# Patient Record
Sex: FEMALE | Race: WHITE | NOT HISPANIC OR LATINO | Employment: OTHER | RURAL
[De-identification: names, ages, dates, MRNs, and addresses within clinical notes are randomized per-mention and may not be internally consistent; named-entity substitution may affect disease eponyms.]

---

## 2020-10-21 ENCOUNTER — HISTORICAL (OUTPATIENT)
Dept: ADMINISTRATIVE | Facility: HOSPITAL | Age: 75
End: 2020-10-21

## 2022-12-31 ENCOUNTER — HOSPITAL ENCOUNTER (EMERGENCY)
Facility: HOSPITAL | Age: 77
Discharge: HOME | End: 2022-12-31
Payer: MEDICARE

## 2022-12-31 VITALS
BODY MASS INDEX: 33.32 KG/M2 | SYSTOLIC BLOOD PRESSURE: 159 MMHG | WEIGHT: 200 LBS | RESPIRATION RATE: 16 BRPM | OXYGEN SATURATION: 98 % | DIASTOLIC BLOOD PRESSURE: 73 MMHG | HEART RATE: 63 BPM | TEMPERATURE: 99 F | HEIGHT: 65 IN

## 2022-12-31 DIAGNOSIS — S89.92XA LEFT LEG INJURY, INITIAL ENCOUNTER: Primary | ICD-10-CM

## 2022-12-31 PROCEDURE — 99284 EMERGENCY DEPT VISIT MOD MDM: CPT | Mod: 25

## 2022-12-31 PROCEDURE — 99282 PR EMERGENCY DEPT VISIT,LEVEL II: ICD-10-PCS | Mod: EDII,,, | Performed by: NURSE PRACTITIONER

## 2022-12-31 PROCEDURE — 63600175 PHARM REV CODE 636 W HCPCS: Performed by: NURSE PRACTITIONER

## 2022-12-31 PROCEDURE — 90715 TDAP VACCINE 7 YRS/> IM: CPT | Performed by: NURSE PRACTITIONER

## 2022-12-31 PROCEDURE — 99282 EMERGENCY DEPT VISIT SF MDM: CPT | Mod: EDII,,, | Performed by: NURSE PRACTITIONER

## 2022-12-31 PROCEDURE — 99282 EMERGENCY DEPT VISIT SF MDM: CPT | Mod: GF,25

## 2022-12-31 PROCEDURE — 90471 IMMUNIZATION ADMIN: CPT | Performed by: NURSE PRACTITIONER

## 2022-12-31 RX ORDER — SIMVASTATIN 40 MG/1
1 TABLET, FILM COATED ORAL NIGHTLY
COMMUNITY
Start: 2022-12-21

## 2022-12-31 RX ORDER — TRIAMCINOLONE ACETONIDE 1 MG/G
CREAM TOPICAL
COMMUNITY
Start: 2022-11-21

## 2022-12-31 RX ORDER — METOPROLOL SUCCINATE 50 MG/1
1 TABLET, EXTENDED RELEASE ORAL 2 TIMES DAILY
COMMUNITY
Start: 2022-12-12

## 2022-12-31 RX ORDER — PRENATAL VIT CALC,IRON,FOLIC
1 TABLET ORAL DAILY
COMMUNITY
End: 2024-03-04 | Stop reason: CLARIF

## 2022-12-31 RX ORDER — OXYBUTYNIN CHLORIDE 10 MG/1
1 TABLET, EXTENDED RELEASE ORAL 2 TIMES DAILY
COMMUNITY
Start: 2022-12-23

## 2022-12-31 RX ORDER — ALBUTEROL SULFATE 90 UG/1
2 AEROSOL, METERED RESPIRATORY (INHALATION) EVERY 6 HOURS PRN
COMMUNITY
Start: 2022-11-21

## 2022-12-31 RX ORDER — CALC/MAG/B COMPLEX/D3/HERB 61
15 TABLET ORAL
COMMUNITY
End: 2024-03-04 | Stop reason: CLARIF

## 2022-12-31 RX ORDER — SERTRALINE HYDROCHLORIDE 100 MG/1
100 TABLET, FILM COATED ORAL
COMMUNITY
Start: 2022-12-21

## 2022-12-31 RX ORDER — FAMOTIDINE 20 MG/1
TABLET, FILM COATED ORAL
COMMUNITY
Start: 2022-06-25 | End: 2024-03-04 | Stop reason: CLARIF

## 2022-12-31 RX ORDER — FLUTICASONE PROPIONATE AND SALMETEROL 250; 50 UG/1; UG/1
1 POWDER RESPIRATORY (INHALATION)
COMMUNITY
Start: 2022-11-21

## 2022-12-31 RX ORDER — CHOLECALCIFEROL (VITAMIN D3) 25 MCG
5000 TABLET ORAL
COMMUNITY

## 2022-12-31 RX ORDER — ASPIRIN 325 MG
325 TABLET ORAL EVERY OTHER DAY
COMMUNITY

## 2022-12-31 RX ADMIN — TETANUS TOXOID, REDUCED DIPHTHERIA TOXOID AND ACELLULAR PERTUSSIS VACCINE, ADSORBED 0.5 ML: 5; 2.5; 8; 8; 2.5 SUSPENSION INTRAMUSCULAR at 03:12

## 2022-12-31 NOTE — ED PROVIDER NOTES
Encounter Date: 12/31/2022       History     Chief Complaint   Patient presents with    Fall     C/o slip on gravel resulting in a fall on 12/28/22. Now c/o abrasions to bilateral palms and left knee abrasion with swelling and ecchymosis.      Patient presents with an injury of the left lower leg.  Reports slipping on gravel 3 days ago.  Fell onto her left shin and hands.  She has large hematoma of the anterior left shin and ecchymosis the bases of the palms bilaterally.  No underlying bony tenderness of the hands or leg.  She is ambulatory.    Review of patient's allergies indicates:   Allergen Reactions    Pentazocine Nausea Only     Past Medical History:   Diagnosis Date    Asthma     Hypertension     Mixed hyperlipidemia      Past Surgical History:   Procedure Laterality Date    COSMETIC SURGERY       History reviewed. No pertinent family history.  Social History     Tobacco Use    Smoking status: Never    Smokeless tobacco: Never     Review of Systems   Respiratory: Negative.     Cardiovascular: Negative.    Musculoskeletal:  Positive for arthralgias.   Neurological: Negative.      Physical Exam     Initial Vitals [12/31/22 1516]   BP Pulse Resp Temp SpO2   (!) 159/73 63 16 98.6 °F (37 °C) 98 %      MAP       --         Physical Exam    Nursing note and vitals reviewed.  Constitutional: No distress.   HENT:   Head: Normocephalic and atraumatic.   Eyes: EOM are normal. Pupils are equal, round, and reactive to light.   Neck: Neck supple.   Cardiovascular:  Normal rate.           Pulmonary/Chest: No respiratory distress.   Musculoskeletal:         General: Normal range of motion.        Arms:       Cervical back: Neck supple.        Legs:      Neurological: She is alert and oriented to person, place, and time. GCS score is 15. GCS eye subscore is 4. GCS verbal subscore is 5. GCS motor subscore is 6.   Skin: Skin is warm and dry. Capillary refill takes less than 2 seconds.       Medical Screening Exam   See Full  Note    ED Course   Procedures  Labs Reviewed - No data to display       Imaging Results              X-Ray Tibia Fibula 2 View Left (Final result)  Result time 12/31/22 15:37:55      Final result by Hesham Kessler MD (12/31/22 15:37:55)                   Impression:      No acute findings.      Electronically signed by: Hesham Kessler  Date:    12/31/2022  Time:    15:37               Narrative:    EXAMINATION:  XR TIBIA FIBULA 2 VIEW LEFT    CLINICAL HISTORY:  Unspecified injury of left lower leg, initial encounter    TECHNIQUE:  AP and lateral views of the left tibia and fibula were performed.    COMPARISON:  None.    FINDINGS:  No fracture detected.  No osseous lesion.  The soft tissues are unremarkable.                                       Medications   Tdap (BOOSTRIX) vaccine injection 0.5 mL (0.5 mLs Intramuscular Given 12/31/22 1519)                       Clinical Impression:   Final diagnoses:  [S89.92XA] Left leg injury, initial encounter (Primary)        ED Disposition Condition    Discharge Stable          ED Prescriptions    None       Follow-up Information    None          Aniket Boykin, LESLEE  01/09/23 [id number]

## 2023-02-10 ENCOUNTER — HOSPITAL ENCOUNTER (EMERGENCY)
Facility: HOSPITAL | Age: 78
Discharge: HOME | End: 2023-02-10
Payer: MEDICARE

## 2023-02-10 VITALS
DIASTOLIC BLOOD PRESSURE: 67 MMHG | OXYGEN SATURATION: 97 % | WEIGHT: 194 LBS | RESPIRATION RATE: 20 BRPM | BODY MASS INDEX: 32.32 KG/M2 | HEART RATE: 62 BPM | HEIGHT: 65 IN | SYSTOLIC BLOOD PRESSURE: 119 MMHG | TEMPERATURE: 98 F

## 2023-02-10 DIAGNOSIS — S32.020A COMPRESSION FRACTURE OF L2 VERTEBRA, INITIAL ENCOUNTER: Primary | ICD-10-CM

## 2023-02-10 DIAGNOSIS — M54.50 ACUTE RIGHT-SIDED LOW BACK PAIN WITHOUT SCIATICA: ICD-10-CM

## 2023-02-10 PROCEDURE — 99284 PR EMERGENCY DEPT VISIT,LEVEL IV: ICD-10-PCS | Mod: EDII,,, | Performed by: PHYSICIAN ASSISTANT

## 2023-02-10 PROCEDURE — 99284 EMERGENCY DEPT VISIT MOD MDM: CPT | Mod: GF

## 2023-02-10 PROCEDURE — 99284 EMERGENCY DEPT VISIT MOD MDM: CPT | Mod: 25

## 2023-02-10 PROCEDURE — 63600175 PHARM REV CODE 636 W HCPCS: Performed by: PHYSICIAN ASSISTANT

## 2023-02-10 PROCEDURE — 99284 EMERGENCY DEPT VISIT MOD MDM: CPT | Mod: EDII,,, | Performed by: PHYSICIAN ASSISTANT

## 2023-02-10 PROCEDURE — 96372 THER/PROPH/DIAG INJ SC/IM: CPT | Performed by: PHYSICIAN ASSISTANT

## 2023-02-10 RX ORDER — MELOXICAM 15 MG/1
15 TABLET ORAL DAILY
Qty: 30 TABLET | Refills: 0 | Status: SHIPPED | OUTPATIENT
Start: 2023-02-10 | End: 2024-03-04 | Stop reason: CLARIF

## 2023-02-10 RX ORDER — KETOROLAC TROMETHAMINE 30 MG/ML
30 INJECTION, SOLUTION INTRAMUSCULAR; INTRAVENOUS
Status: COMPLETED | OUTPATIENT
Start: 2023-02-10 | End: 2023-02-10

## 2023-02-10 RX ORDER — HYDROCODONE BITARTRATE AND ACETAMINOPHEN 7.5; 325 MG/1; MG/1
1 TABLET ORAL EVERY 6 HOURS PRN
Qty: 12 TABLET | Refills: 0 | Status: SHIPPED | OUTPATIENT
Start: 2023-02-10 | End: 2024-03-04 | Stop reason: CLARIF

## 2023-02-10 RX ADMIN — KETOROLAC TROMETHAMINE 30 MG: 30 INJECTION, SOLUTION INTRAMUSCULAR; INTRAVENOUS at 05:02

## 2023-02-10 NOTE — ED TRIAGE NOTES
Pt with c/o fall last pm. Pt states her dog tripped her and made her fall backwards. Pt states she landed on her back/buttocks. Pt states she hit her head. Pt denies LOC. Denies headache. Pt with c/o lower mid back pain. Pt having difficulty walking. Pt c/o severe back pain.

## 2023-02-10 NOTE — ED PROVIDER NOTES
Encounter Date: 2/10/2023       History     Chief Complaint   Patient presents with    Back Pain     Fall last pm    Fall     Patient is a 77-year-old female with history of right low back pain secondary to getting tripped by 1 of her dogs last night.    She denies any radicular symptoms.    She states she has a past medical history of bulging disc in her low back.    She has baclofen muscle relaxer to take at home.    She has not taken any anti-inflammatories today.    Her previous labs show that her BUN creatinine within normal limits.    She has a past medical history of asthma, hyperlipidemia, and hypertension.    Patient is a past surgical history of cosmetic surgery.    She denies tobacco or recreational drug use, but admits to daily alcohol use.    Review of patient's allergies indicates:   Allergen Reactions    Pentazocine Nausea Only    Talwin compound Nausea And Vomiting     Past Medical History:   Diagnosis Date    Asthma     Hypertension     Mixed hyperlipidemia      Past Surgical History:   Procedure Laterality Date    COSMETIC SURGERY       History reviewed. No pertinent family history.  Social History     Tobacco Use    Smoking status: Never    Smokeless tobacco: Never   Substance Use Topics    Alcohol use: Yes     Comment: daily    Drug use: Never     Review of Systems   Musculoskeletal:  Positive for back pain.   All other systems reviewed and are negative.    Physical Exam     Initial Vitals [02/10/23 1657]   BP Pulse Resp Temp SpO2   119/67 62 20 98.1 °F (36.7 °C) 97 %      MAP       --         Physical Exam    Nursing note and vitals reviewed.  Constitutional:   Obese   HENT:   Head: Atraumatic.   Eyes: EOM are normal.   Neck: Neck supple.   Cardiovascular:  Normal rate, regular rhythm and normal heart sounds.           Pulmonary/Chest: Breath sounds normal.   Abdominal: Abdomen is soft.   Musculoskeletal:      Cervical back: Neck supple.      Comments: Tender to palpation to the right low back  L4-L5 down in the the SI joint     Neurological: She is alert and oriented to person, place, and time.   Skin: Skin is dry.   Psychiatric: She has a normal mood and affect.       Medical Screening Exam   See Full Note    ED Course   Procedures  Labs Reviewed - No data to display       Imaging Results              X-Ray Lumbar Spine Ap And Lateral (Final result)  Result time 02/10/23 17:41:17      Final result by Enrique Glover DO (02/10/23 17:41:17)                   Impression:      Age indeterminate mild compression fracture of L2.      Electronically signed by: Enrique Glover  Date:    02/10/2023  Time:    17:41               Narrative:    EXAMINATION:  XR LUMBAR SPINE AP AND LATERAL    CLINICAL HISTORY:  fell, Right low back pain.;.    TECHNIQUE:  XR LUMBAR SPINE AP AND LATERAL    COMPARISON:  2017    FINDINGS:  Age indeterminate mild compression fracture of L2.    No other acute fractures.    Severe degenerative disc disease L4-5 and L5-S1.    Mild anterior osteophytosis.  Moderate multilevel facet change.  Multiple levels of neural foraminal narrowing, most significant at L4-5 and L5-S1.                                       Medications   ketorolac injection 30 mg (30 mg Intramuscular Given 2/10/23 1723)     Medical Decision Making:   Initial Assessment:   Patient is a 77-year-old female with history of right low back pain secondary to getting tripped by 1 of her dogs last night.    She denies any radicular symptoms.    She states she has a past medical history of bulging disc in her low back.    She has baclofen muscle relaxer to take at home.    She has not taken any anti-inflammatories today.    Her previous labs show that her BUN creatinine within normal limits.    She has a past medical history of asthma, hyperlipidemia, and hypertension.    Patient is a past surgical history of cosmetic surgery.    She denies tobacco or recreational drug use, but admits to daily alcohol use.  Differential  Diagnosis:   Low back pain.    Sacroiliac pain.    Will get an x-ray to rule out fracture.  ED Management:  Patient will be given Toradol.    I will get an x-ray to rule out fracture.      Patient has a compression fracture age indeterminate at L2.    I will put in an ambulatory referral to Dr. Rios for evaluation and treatment.                 Clinical Impression:   Final diagnoses:  [M54.50] Acute right-sided low back pain without sciatica  [S32.020A] Compression fracture of L2 vertebra, initial encounter (Primary)        ED Disposition Condition    Discharge Stable          ED Prescriptions    None       Follow-up Information       Follow up With Specialties Details Why Contact Info    Neil Rios MD Orthopedic Surgery, Spine Surgery Schedule an appointment as soon as possible for a visit   [address] [place] [address] [employer] [facility] [id number]  [phone]               ARTEM Lilly  02/10/23 1721       ARTEM Lilly  02/10/23 [id number]

## 2023-02-11 NOTE — ED NOTES
Pt refusing to leave unless she is given pain med. ARTEM Hope notified. He is in pt room to discuss.

## 2024-03-04 ENCOUNTER — HOSPITAL ENCOUNTER (EMERGENCY)
Facility: HOSPITAL | Age: 79
Discharge: HOME | End: 2024-03-04
Payer: MEDICARE

## 2024-03-04 VITALS
HEIGHT: 65 IN | TEMPERATURE: 98 F | SYSTOLIC BLOOD PRESSURE: 125 MMHG | DIASTOLIC BLOOD PRESSURE: 49 MMHG | BODY MASS INDEX: 32.15 KG/M2 | HEART RATE: 66 BPM | OXYGEN SATURATION: 92 % | RESPIRATION RATE: 18 BRPM | WEIGHT: 193 LBS

## 2024-03-04 DIAGNOSIS — S22.040A COMPRESSION FRACTURE OF T4 VERTEBRA, INITIAL ENCOUNTER: Primary | ICD-10-CM

## 2024-03-04 DIAGNOSIS — M54.9 BACK PAIN: ICD-10-CM

## 2024-03-04 PROCEDURE — 93005 ELECTROCARDIOGRAM TRACING: CPT

## 2024-03-04 PROCEDURE — 99285 EMERGENCY DEPT VISIT HI MDM: CPT | Mod: 25

## 2024-03-04 PROCEDURE — 63600175 PHARM REV CODE 636 W HCPCS: Performed by: NURSE PRACTITIONER

## 2024-03-04 PROCEDURE — 96372 THER/PROPH/DIAG INJ SC/IM: CPT | Performed by: NURSE PRACTITIONER

## 2024-03-04 PROCEDURE — 93010 ELECTROCARDIOGRAM REPORT: CPT | Performed by: HOSPITALIST

## 2024-03-04 PROCEDURE — 99284 EMERGENCY DEPT VISIT MOD MDM: CPT | Mod: GF | Performed by: NURSE PRACTITIONER

## 2024-03-04 RX ORDER — TRAMADOL HYDROCHLORIDE 50 MG/1
50 TABLET ORAL EVERY 6 HOURS PRN
COMMUNITY
Start: 2023-10-11

## 2024-03-04 RX ORDER — BACLOFEN 10 MG/1
10 TABLET ORAL 3 TIMES DAILY
Status: ON HOLD | COMMUNITY
Start: 2023-10-12 | End: 2024-03-22 | Stop reason: HOSPADM

## 2024-03-04 RX ORDER — OMEPRAZOLE 20 MG/1
20 CAPSULE, DELAYED RELEASE ORAL DAILY
COMMUNITY

## 2024-03-04 RX ORDER — CHLORTHALIDONE 25 MG/1
25 TABLET ORAL
COMMUNITY
Start: 2024-02-12 | End: 2025-02-11

## 2024-03-04 RX ORDER — HYDROCODONE BITARTRATE AND ACETAMINOPHEN 5; 325 MG/1; MG/1
1 TABLET ORAL EVERY 4 HOURS PRN
Qty: 12 TABLET | Refills: 0 | Status: ON HOLD | OUTPATIENT
Start: 2024-03-04 | End: 2024-03-22 | Stop reason: HOSPADM

## 2024-03-04 RX ORDER — KETOROLAC TROMETHAMINE 30 MG/ML
30 INJECTION, SOLUTION INTRAMUSCULAR; INTRAVENOUS
Status: COMPLETED | OUTPATIENT
Start: 2024-03-04 | End: 2024-03-04

## 2024-03-04 RX ADMIN — KETOROLAC TROMETHAMINE 30 MG: 30 INJECTION, SOLUTION INTRAMUSCULAR; INTRAVENOUS at 05:03

## 2024-03-04 NOTE — DISCHARGE INSTRUCTIONS
Wear your TLSO brace, as discussed.  You can continue to use Ibuprofen and the Ultram for pain.  I have prescribed you Norco to use when the pain is severe.  However, do not mix it with the Ultram.  Call your spine doctor in [place] in the morning.  Let him know about you injury, your ER visit and the probably T4 compression fracture.  You will need to schedule a follow-up appointment with him. Return to the ER for arm/leg weakness, numbness, tingling or difficulty breathing.

## 2024-03-04 NOTE — ED PROVIDER NOTES
Encounter Date: 3/4/2024       History     Chief Complaint   Patient presents with    Shortness of Breath    Back Pain     Friday leaned down and back started hurting, hx back pain     78 year old  female presents to the ER for back pain since Friday.  Pt reports she was bending over to  something off of the ground, when she felt the acute back pain.  She reports the pain is mid thoracic region radiating around david ribs.  Denies chest pain.  Pain present with movement only.      The history is provided by the patient.   Back Pain   This is a new problem. The current episode started several days ago. The problem occurs 5 - 10 times per day. The problem has been unchanged. Associated with: bending over. The pain is present in the thoracic spine. The quality of the pain is described as stabbing. Radiates to: radiates around david ribs. The symptoms are aggravated by bending, twisting and certain positions. The pain is The same all the time. Pertinent negatives include no chest pain, no fever, no numbness, no weight loss, no headaches, no abdominal pain, no abdominal swelling, no bowel incontinence, no perianal numbness, no bladder incontinence, no dysuria, no pelvic pain, no leg pain, no paresthesias, no paresis, no tingling and no weakness. She has tried analgesics and muscle relaxants for the symptoms. The treatment provided no relief.     Review of patient's allergies indicates:   Allergen Reactions    Pentazocine Nausea Only    Talwin compound Nausea And Vomiting     Past Medical History:   Diagnosis Date    Asthma     Hypertension     Mixed hyperlipidemia      Past Surgical History:   Procedure Laterality Date    COSMETIC SURGERY       History reviewed. No pertinent family history.  Social History     Tobacco Use    Smoking status: Never    Smokeless tobacco: Never   Substance Use Topics    Alcohol use: Yes     Comment: daily    Drug use: Never     Review of Systems   Constitutional:  Negative for  fever and weight loss.   HENT:  Negative for sore throat.    Respiratory:  Negative for shortness of breath.    Cardiovascular:  Negative for chest pain.   Gastrointestinal:  Negative for abdominal pain, bowel incontinence and nausea.   Genitourinary:  Negative for bladder incontinence, dysuria and pelvic pain.   Musculoskeletal:  Positive for back pain.   Skin:  Negative for rash.   Neurological:  Negative for tingling, weakness, numbness, headaches and paresthesias.   Hematological:  Does not bruise/bleed easily.       Physical Exam     Initial Vitals [03/04/24 1554]   BP Pulse Resp Temp SpO2   (!) 129/48 70 18 98.3 °F (36.8 °C) 96 %      MAP       --         Physical Exam    Nursing note and vitals reviewed.  Constitutional: She appears well-developed and well-nourished. She is not diaphoretic. She is cooperative.  Non-toxic appearance. No distress. She is not intubated.   Appears uncomfortable.   HENT:   Head: Normocephalic and atraumatic.   Eyes: Pupils are equal, round, and reactive to light.   Neck: Neck supple.   Cardiovascular:  Normal rate, regular rhythm, normal heart sounds, intact distal pulses and normal pulses.     Exam reveals no gallop and no friction rub.       No murmur heard.  Pulmonary/Chest: Effort normal and breath sounds normal. No accessory muscle usage. No apnea, no tachypnea and no bradypnea. She is not intubated. No respiratory distress. She has no decreased breath sounds. She has no wheezes. She has no rhonchi. She has no rales. She exhibits no tenderness and no bony tenderness.   Musculoskeletal:      Cervical back: Neck supple. No swelling, tenderness or bony tenderness. No pain with movement.      Thoracic back: No swelling, deformity, spasms, tenderness or bony tenderness. Normal range of motion. No scoliosis.      Lumbar back: Normal. No tenderness or bony tenderness.     Neurological: She is alert and oriented to person, place, and time.   Skin: Skin is warm and dry. Capillary  refill takes less than 2 seconds.   Psychiatric: She has a normal mood and affect.         Medical Screening Exam   See Full Note    ED Course   Procedures  Labs Reviewed - No data to display  EKG Readings: (Independently Interpreted)   Initial Reading: No STEMI. Rhythm: Normal Sinus Rhythm. Heart Rate: 67. Ectopy: No Ectopy. Conduction: Normal. ST Segments: Normal ST Segments. T Waves: Normal. Clinical Impression: Normal Sinus Rhythm       Imaging Results              XR Ribs Min 4 Views Bilat w/PA Chest (Final result)  Result time 03/04/24 17:04:37      Final result by Enrique Glover DO (03/04/24 17:04:37)                   Impression:      The lungs, pleura are normal. Mild to moderately enlarged heart.    No acutely displaced fractures of the ribs.    Healed fracture of the right-sided humerus.      Electronically signed by: Enrique Glover  Date:    03/04/2024  Time:    17:04               Narrative:    EXAMINATION:  XR RIBS MIN 4 VIEWS BILAT W/PA CHEST    CLINICAL HISTORY:  david rib pain s/p injury;    TECHNIQUE:  XR RIBS MIN 4 VIEWS BILAT W/PA CHEST    COMPARISON:  2017    FINDINGS:  The lungs, pleura are normal.  Mild to moderately enlarged heart.    No acutely displaced fractures of the ribs.    Healed fracture of the right-sided humerus.                                       CT Thoracic Spine Without Contrast (Final result)  Result time 03/04/24 17:45:30      Final result by Enrique Glover DO (03/04/24 17:45:30)                   Impression:      Questionable mild age-indeterminate compression fracture of T4, correlate with point tenderness.      Electronically signed by: Enrique Glover  Date:    03/04/2024  Time:    17:45               Narrative:    EXAMINATION:  CT THORACIC SPINE WITHOUT CONTRAST    CLINICAL HISTORY:  mid back pain, trauma related    TECHNIQUE:  CT axial images of thoracic spine were obtained without contrast.  Sagittal and coronal reformats were  performed.    COMPARISON:  None    FINDINGS:  Questionable mild age-indeterminate compression fracture of T4, correlate with point tenderness.    The vertebral body heights and alignment are otherwise normal.    Moderate to severe multilevel degenerative disc disease.    Moderate multilevel degenerative change of the thoracic spine.    The lungs are clear.  The heart is enlarged.  Calcified vascular disease.  Upper abdomen is normal.                                       Medications   ketorolac injection 30 mg (30 mg Intramuscular Given 3/4/24 1711)     Medical Decision Making  Problems Addressed:  Compression fracture of T4 vertebra, initial encounter: acute illness or injury     Details: Refer to [facility], which is her spine specialist.     Amount and/or Complexity of Data Reviewed  Radiology: ordered. Decision-making details documented in ED Course.    Risk  Prescription drug management.               ED Course as of 03/04/24 1759   Mon Mar 04, 2024   1750 Pt has TLSO brace at home.  She is followed by Dr Fritz at [facility].  She was instructed to call him tomorrow for follow-up.  The Toradol was helpful and improved her pain.  She has Robaxin and Ultram at home.  I will send home with Norco to use when pain is severe.  She was instructed not take with the Ultram.   [AG]   1753 Xray dept getting CD of her images to take her to specialist.   [AG]      ED Course User Index  [AG] Belle Garcia FNP                           Clinical Impression:   Final diagnoses:  [M54.9] Back pain  [S22.040A] Compression fracture of T4 vertebra, initial encounter (Primary)        ED Disposition Condition    Discharge Stable          ED Prescriptions       Medication Sig Dispense Start Date End Date Auth. Provider    HYDROcodone-acetaminophen (NORCO) 5-325 mg per tablet Take 1 tablet by mouth every 4 (four) hours as needed for Pain. 12 tablet 3/4/2024 -- Belle Garcia FNP          Follow-up  Information       Follow up With Specialties Details Why Contact Info    Call your spine doctor in [place].  Call in 1 day               Belle Garcia, LESLEE  03/04/24 [id number]

## 2024-03-06 ENCOUNTER — HOSPITAL ENCOUNTER (INPATIENT)
Facility: HOSPITAL | Age: 79
LOS: 3 days | Discharge: NURSING, REHAB OR LONG-TERM CARE | DRG: 641 | End: 2024-03-11
Attending: FAMILY MEDICINE | Admitting: FAMILY MEDICINE
Payer: MEDICARE

## 2024-03-06 DIAGNOSIS — K21.9 GASTROESOPHAGEAL REFLUX DISEASE, UNSPECIFIED WHETHER ESOPHAGITIS PRESENT: ICD-10-CM

## 2024-03-06 DIAGNOSIS — Z78.9 IMPAIRED MOBILITY AND ADLS: Primary | ICD-10-CM

## 2024-03-06 DIAGNOSIS — R06.2 WHEEZING: ICD-10-CM

## 2024-03-06 DIAGNOSIS — S22.040G COMPRESSION FRACTURE OF T4 VERTEBRA WITH DELAYED HEALING, SUBSEQUENT ENCOUNTER: ICD-10-CM

## 2024-03-06 DIAGNOSIS — S22.040D COMPRESSION FRACTURE OF T4 VERTEBRA WITH ROUTINE HEALING, SUBSEQUENT ENCOUNTER: ICD-10-CM

## 2024-03-06 DIAGNOSIS — Z74.09 IMPAIRED MOBILITY AND ADLS: Primary | ICD-10-CM

## 2024-03-06 DIAGNOSIS — R52 INTRACTABLE PAIN: ICD-10-CM

## 2024-03-06 DIAGNOSIS — M54.6 ACUTE MIDLINE THORACIC BACK PAIN: ICD-10-CM

## 2024-03-06 DIAGNOSIS — R41.0 DELIRIUM: ICD-10-CM

## 2024-03-06 DIAGNOSIS — R44.1 VISUAL HALLUCINATIONS: ICD-10-CM

## 2024-03-06 DIAGNOSIS — E87.6 HYPOKALEMIA: ICD-10-CM

## 2024-03-06 DIAGNOSIS — R63.0 POOR APPETITE: ICD-10-CM

## 2024-03-06 DIAGNOSIS — E78.5 DYSLIPIDEMIA: ICD-10-CM

## 2024-03-06 DIAGNOSIS — K59.00 CONSTIPATION, UNSPECIFIED CONSTIPATION TYPE: ICD-10-CM

## 2024-03-06 DIAGNOSIS — M54.9 ACUTE MIDLINE BACK PAIN, UNSPECIFIED BACK LOCATION: ICD-10-CM

## 2024-03-06 DIAGNOSIS — R00.2 PALPITATIONS: ICD-10-CM

## 2024-03-06 DIAGNOSIS — R29.6 FREQUENT FALLS: ICD-10-CM

## 2024-03-06 DIAGNOSIS — E86.0 DEHYDRATION: ICD-10-CM

## 2024-03-06 DIAGNOSIS — R41.82 AMS (ALTERED MENTAL STATUS): ICD-10-CM

## 2024-03-06 LAB
ALBUMIN SERPL BCP-MCNC: 2.7 G/DL (ref 3.5–5)
ALBUMIN/GLOB SERPL: 0.5 {RATIO}
ALP SERPL-CCNC: 87 U/L (ref 55–142)
ALT SERPL W P-5'-P-CCNC: 28 U/L (ref 13–56)
ANION GAP SERPL CALCULATED.3IONS-SCNC: 12 MMOL/L (ref 7–16)
AST SERPL W P-5'-P-CCNC: 48 U/L (ref 15–37)
BACTERIA #/AREA URNS HPF: ABNORMAL /HPF
BASOPHILS # BLD AUTO: 0.01 K/UL (ref 0–0.2)
BASOPHILS NFR BLD AUTO: 0.1 % (ref 0–1)
BILIRUB SERPL-MCNC: 0.5 MG/DL (ref ?–1.2)
BILIRUB UR QL STRIP: ABNORMAL
BUN SERPL-MCNC: 20 MG/DL (ref 7–18)
BUN/CREAT SERPL: 26 (ref 6–20)
CALCIUM SERPL-MCNC: 9.4 MG/DL (ref 8.5–10.1)
CHLORIDE SERPL-SCNC: 95 MMOL/L (ref 98–107)
CLARITY UR: CLEAR
CO2 SERPL-SCNC: 30 MMOL/L (ref 21–32)
COLOR UR: YELLOW
CREAT SERPL-MCNC: 0.78 MG/DL (ref 0.55–1.02)
DIFFERENTIAL METHOD BLD: ABNORMAL
EGFR (NO RACE VARIABLE) (RUSH/TITUS): 78 ML/MIN/1.73M2
EOSINOPHIL # BLD AUTO: 0.01 K/UL (ref 0–0.5)
EOSINOPHIL NFR BLD AUTO: 0.1 % (ref 1–4)
ERYTHROCYTE [DISTWIDTH] IN BLOOD BY AUTOMATED COUNT: 12.1 % (ref 11.5–14.5)
GLOBULIN SER-MCNC: 5.1 G/DL (ref 2–4)
GLUCOSE SERPL-MCNC: 127 MG/DL (ref 74–106)
GLUCOSE UR STRIP-MCNC: NEGATIVE MG/DL
HCT VFR BLD AUTO: 32.2 % (ref 38–47)
HGB BLD-MCNC: 10.7 G/DL (ref 12–16)
HYALINE CASTS #/AREA URNS LPF: ABNORMAL /LPF
KETONES UR STRIP-SCNC: 40 MG/DL
LEUKOCYTE ESTERASE UR QL STRIP: NEGATIVE
LYMPHOCYTES # BLD AUTO: 0.89 K/UL (ref 1–4.8)
LYMPHOCYTES NFR BLD AUTO: 5.6 % (ref 27–41)
MCH RBC QN AUTO: 31.8 PG (ref 27–31)
MCHC RBC AUTO-ENTMCNC: 33.2 G/DL (ref 32–36)
MCV RBC AUTO: 95.8 FL (ref 80–96)
MONOCYTES # BLD AUTO: 1.27 K/UL (ref 0–0.8)
MONOCYTES NFR BLD AUTO: 8 % (ref 2–6)
MPC BLD CALC-MCNC: 9.3 FL (ref 9.4–12.4)
MUCOUS THREADS #/AREA URNS HPF: ABNORMAL /HPF
NEUTROPHILS # BLD AUTO: 13.63 K/UL (ref 1.8–7.7)
NEUTROPHILS NFR BLD AUTO: 86.2 % (ref 53–65)
NITRITE UR QL STRIP: NEGATIVE
OHS QRS DURATION: 78 MS
OHS QTC CALCULATION: 424 MS
PH UR STRIP: 6 PH UNITS
PLATELET # BLD AUTO: 235 K/UL (ref 150–400)
POTASSIUM SERPL-SCNC: 3 MMOL/L (ref 3.5–5.1)
PROT SERPL-MCNC: 7.8 G/DL (ref 6.4–8.2)
PROT UR QL STRIP: 100
RBC # BLD AUTO: 3.36 M/UL (ref 4.2–5.4)
RBC # UR STRIP: ABNORMAL /UL
RBC #/AREA URNS HPF: ABNORMAL /HPF
SODIUM SERPL-SCNC: 134 MMOL/L (ref 136–145)
SP GR UR STRIP: 1.02
SQUAMOUS #/AREA URNS LPF: ABNORMAL /LPF
UROBILINOGEN UR STRIP-ACNC: 0.2 MG/DL
WBC # BLD AUTO: 15.81 K/UL (ref 4.5–11)
WBC #/AREA URNS HPF: ABNORMAL /HPF
YEAST #/AREA URNS HPF: ABNORMAL /HPF

## 2024-03-06 PROCEDURE — 27000221 HC OXYGEN, UP TO 24 HOURS

## 2024-03-06 PROCEDURE — 25000242 PHARM REV CODE 250 ALT 637 W/ HCPCS

## 2024-03-06 PROCEDURE — 82306 VITAMIN D 25 HYDROXY: CPT

## 2024-03-06 PROCEDURE — 99285 EMERGENCY DEPT VISIT HI MDM: CPT | Mod: GF

## 2024-03-06 PROCEDURE — 81003 URINALYSIS AUTO W/O SCOPE: CPT

## 2024-03-06 PROCEDURE — 25000003 PHARM REV CODE 250

## 2024-03-06 PROCEDURE — 80053 COMPREHEN METABOLIC PANEL: CPT

## 2024-03-06 PROCEDURE — 84443 ASSAY THYROID STIM HORMONE: CPT

## 2024-03-06 PROCEDURE — 94761 N-INVAS EAR/PLS OXIMETRY MLT: CPT

## 2024-03-06 PROCEDURE — 99222 1ST HOSP IP/OBS MODERATE 55: CPT | Mod: AI,,, | Performed by: FAMILY MEDICINE

## 2024-03-06 PROCEDURE — 94640 AIRWAY INHALATION TREATMENT: CPT

## 2024-03-06 PROCEDURE — 99285 EMERGENCY DEPT VISIT HI MDM: CPT | Mod: 25

## 2024-03-06 PROCEDURE — 85025 COMPLETE CBC W/AUTO DIFF WBC: CPT

## 2024-03-06 PROCEDURE — 96360 HYDRATION IV INFUSION INIT: CPT

## 2024-03-06 RX ORDER — HYDROCODONE BITARTRATE AND ACETAMINOPHEN 5; 325 MG/1; MG/1
1 TABLET ORAL
Status: COMPLETED | OUTPATIENT
Start: 2024-03-06 | End: 2024-03-06

## 2024-03-06 RX ORDER — POTASSIUM CHLORIDE 20 MEQ/1
40 TABLET, EXTENDED RELEASE ORAL ONCE
Status: COMPLETED | OUTPATIENT
Start: 2024-03-07 | End: 2024-03-06

## 2024-03-06 RX ORDER — IPRATROPIUM BROMIDE AND ALBUTEROL SULFATE 2.5; .5 MG/3ML; MG/3ML
3 SOLUTION RESPIRATORY (INHALATION)
Status: COMPLETED | OUTPATIENT
Start: 2024-03-06 | End: 2024-03-06

## 2024-03-06 RX ADMIN — IPRATROPIUM BROMIDE AND ALBUTEROL SULFATE 3 ML: .5; 3 SOLUTION RESPIRATORY (INHALATION) at 10:03

## 2024-03-06 RX ADMIN — SODIUM CHLORIDE 1000 ML: 9 INJECTION, SOLUTION INTRAVENOUS at 10:03

## 2024-03-06 RX ADMIN — HYDROCODONE BITARTRATE AND ACETAMINOPHEN 1 TABLET: 5; 325 TABLET ORAL at 11:03

## 2024-03-06 RX ADMIN — POTASSIUM CHLORIDE 40 MEQ: 1500 TABLET, EXTENDED RELEASE ORAL at 11:03

## 2024-03-07 PROBLEM — E78.5 DYSLIPIDEMIA: Status: ACTIVE | Noted: 2024-03-07

## 2024-03-07 PROBLEM — K21.9 GERD (GASTROESOPHAGEAL REFLUX DISEASE): Status: ACTIVE | Noted: 2024-03-07

## 2024-03-07 PROBLEM — E86.0 DEHYDRATION: Status: ACTIVE | Noted: 2024-03-07

## 2024-03-07 PROBLEM — E87.6 HYPOKALEMIA: Status: ACTIVE | Noted: 2024-03-07

## 2024-03-07 PROBLEM — Z78.9 IMPAIRED MOBILITY AND ADLS: Status: ACTIVE | Noted: 2024-03-07

## 2024-03-07 PROBLEM — M54.9 ACUTE MIDLINE BACK PAIN: Status: ACTIVE | Noted: 2024-03-07

## 2024-03-07 PROBLEM — R00.2 PALPITATIONS: Status: ACTIVE | Noted: 2024-03-07

## 2024-03-07 PROBLEM — Z74.09 IMPAIRED MOBILITY AND ADLS: Status: ACTIVE | Noted: 2024-03-07

## 2024-03-07 PROBLEM — K59.00 CONSTIPATION: Status: ACTIVE | Noted: 2024-03-07

## 2024-03-07 PROBLEM — S22.000A THORACIC COMPRESSION FRACTURE: Status: ACTIVE | Noted: 2024-03-07

## 2024-03-07 LAB
25(OH)D3 SERPL-MCNC: 57.5 NG/ML
ANION GAP SERPL CALCULATED.3IONS-SCNC: 9 MMOL/L (ref 7–16)
BASOPHILS # BLD AUTO: 0.01 K/UL (ref 0–0.2)
BASOPHILS NFR BLD AUTO: 0.1 % (ref 0–1)
BUN SERPL-MCNC: 18 MG/DL (ref 7–18)
BUN/CREAT SERPL: 24 (ref 6–20)
CALCIUM SERPL-MCNC: 8.8 MG/DL (ref 8.5–10.1)
CHLORIDE SERPL-SCNC: 96 MMOL/L (ref 98–107)
CO2 SERPL-SCNC: 30 MMOL/L (ref 21–32)
CREAT SERPL-MCNC: 0.74 MG/DL (ref 0.55–1.02)
DIFFERENTIAL METHOD BLD: ABNORMAL
EGFR (NO RACE VARIABLE) (RUSH/TITUS): 83 ML/MIN/1.73M2
EOSINOPHIL # BLD AUTO: 0.03 K/UL (ref 0–0.5)
EOSINOPHIL NFR BLD AUTO: 0.2 % (ref 1–4)
ERYTHROCYTE [DISTWIDTH] IN BLOOD BY AUTOMATED COUNT: 12.2 % (ref 11.5–14.5)
GLUCOSE SERPL-MCNC: 108 MG/DL (ref 70–105)
GLUCOSE SERPL-MCNC: 110 MG/DL (ref 74–106)
HCT VFR BLD AUTO: 29.6 % (ref 38–47)
HGB BLD-MCNC: 9.8 G/DL (ref 12–16)
INFLUENZA A MOLECULAR (OHS): NEGATIVE
INFLUENZA B MOLECULAR (OHS): NEGATIVE
LYMPHOCYTES # BLD AUTO: 0.92 K/UL (ref 1–4.8)
LYMPHOCYTES NFR BLD AUTO: 6.7 % (ref 27–41)
MAGNESIUM SERPL-MCNC: 1.3 MG/DL (ref 1.7–2.3)
MCH RBC QN AUTO: 31.9 PG (ref 27–31)
MCHC RBC AUTO-ENTMCNC: 33.1 G/DL (ref 32–36)
MCV RBC AUTO: 96.4 FL (ref 80–96)
MONOCYTES # BLD AUTO: 1.26 K/UL (ref 0–0.8)
MONOCYTES NFR BLD AUTO: 9.1 % (ref 2–6)
MPC BLD CALC-MCNC: 9.4 FL (ref 9.4–12.4)
NEUTROPHILS # BLD AUTO: 11.58 K/UL (ref 1.8–7.7)
NEUTROPHILS NFR BLD AUTO: 83.9 % (ref 53–65)
OHS QRS DURATION: 74 MS
OHS QTC CALCULATION: 442 MS
PLATELET # BLD AUTO: 235 K/UL (ref 150–400)
POTASSIUM SERPL-SCNC: 3.2 MMOL/L (ref 3.5–5.1)
POTASSIUM SERPL-SCNC: 4 MMOL/L (ref 3.5–5.1)
RBC # BLD AUTO: 3.07 M/UL (ref 4.2–5.4)
SARS-COV-2 RDRP RESP QL NAA+PROBE: NEGATIVE
SODIUM SERPL-SCNC: 132 MMOL/L (ref 136–145)
TSH SERPL DL<=0.005 MIU/L-ACNC: 1.31 UIU/ML (ref 0.36–3.74)
WBC # BLD AUTO: 13.8 K/UL (ref 4.5–11)

## 2024-03-07 PROCEDURE — 87502 INFLUENZA DNA AMP PROBE: CPT | Performed by: NURSE PRACTITIONER

## 2024-03-07 PROCEDURE — 85025 COMPLETE CBC W/AUTO DIFF WBC: CPT

## 2024-03-07 PROCEDURE — 94761 N-INVAS EAR/PLS OXIMETRY MLT: CPT

## 2024-03-07 PROCEDURE — 25000003 PHARM REV CODE 250: Performed by: NURSE PRACTITIONER

## 2024-03-07 PROCEDURE — 94640 AIRWAY INHALATION TREATMENT: CPT | Mod: XB

## 2024-03-07 PROCEDURE — 63600175 PHARM REV CODE 636 W HCPCS: Performed by: NURSE PRACTITIONER

## 2024-03-07 PROCEDURE — 97162 PT EVAL MOD COMPLEX 30 MIN: CPT

## 2024-03-07 PROCEDURE — 84132 ASSAY OF SERUM POTASSIUM: CPT | Performed by: NURSE PRACTITIONER

## 2024-03-07 PROCEDURE — 87635 SARS-COV-2 COVID-19 AMP PRB: CPT | Performed by: NURSE PRACTITIONER

## 2024-03-07 PROCEDURE — 80048 BASIC METABOLIC PNL TOTAL CA: CPT

## 2024-03-07 PROCEDURE — 82962 GLUCOSE BLOOD TEST: CPT

## 2024-03-07 PROCEDURE — 99232 SBSQ HOSP IP/OBS MODERATE 35: CPT | Mod: ,,, | Performed by: FAMILY MEDICINE

## 2024-03-07 PROCEDURE — 83735 ASSAY OF MAGNESIUM: CPT | Performed by: NURSE PRACTITIONER

## 2024-03-07 PROCEDURE — 96372 THER/PROPH/DIAG INJ SC/IM: CPT | Performed by: NURSE PRACTITIONER

## 2024-03-07 PROCEDURE — 97166 OT EVAL MOD COMPLEX 45 MIN: CPT

## 2024-03-07 PROCEDURE — 99222 1ST HOSP IP/OBS MODERATE 55: CPT | Performed by: NURSE PRACTITIONER

## 2024-03-07 PROCEDURE — 93010 ELECTROCARDIOGRAM REPORT: CPT | Performed by: HOSPITALIST

## 2024-03-07 PROCEDURE — 25000242 PHARM REV CODE 250 ALT 637 W/ HCPCS

## 2024-03-07 PROCEDURE — 27000221 HC OXYGEN, UP TO 24 HOURS

## 2024-03-07 PROCEDURE — G0378 HOSPITAL OBSERVATION PER HR: HCPCS

## 2024-03-07 PROCEDURE — 25000003 PHARM REV CODE 250

## 2024-03-07 PROCEDURE — 93005 ELECTROCARDIOGRAM TRACING: CPT

## 2024-03-07 RX ORDER — MAGNESIUM SULFATE 1 G/100ML
1 INJECTION INTRAVENOUS ONCE
Status: COMPLETED | OUTPATIENT
Start: 2024-03-07 | End: 2024-03-07

## 2024-03-07 RX ORDER — ENOXAPARIN SODIUM 100 MG/ML
40 INJECTION SUBCUTANEOUS EVERY 24 HOURS
Status: DISCONTINUED | OUTPATIENT
Start: 2024-03-07 | End: 2024-03-11 | Stop reason: HOSPADM

## 2024-03-07 RX ORDER — BISACODYL 5 MG
10 TABLET, DELAYED RELEASE (ENTERIC COATED) ORAL ONCE
Status: COMPLETED | OUTPATIENT
Start: 2024-03-07 | End: 2024-03-07

## 2024-03-07 RX ORDER — ACETAMINOPHEN 325 MG/1
650 TABLET ORAL EVERY 6 HOURS PRN
Status: DISCONTINUED | OUTPATIENT
Start: 2024-03-07 | End: 2024-03-11 | Stop reason: HOSPADM

## 2024-03-07 RX ORDER — SERTRALINE HYDROCHLORIDE 50 MG/1
100 TABLET, FILM COATED ORAL DAILY
Status: DISCONTINUED | OUTPATIENT
Start: 2024-03-07 | End: 2024-03-11 | Stop reason: HOSPADM

## 2024-03-07 RX ORDER — SODIUM CHLORIDE 9 MG/ML
INJECTION, SOLUTION INTRAVENOUS CONTINUOUS
Status: DISPENSED | OUTPATIENT
Start: 2024-03-07 | End: 2024-03-07

## 2024-03-07 RX ORDER — SODIUM CHLORIDE 9 MG/ML
INJECTION, SOLUTION INTRAVENOUS CONTINUOUS
Status: DISPENSED | OUTPATIENT
Start: 2024-03-07 | End: 2024-03-08

## 2024-03-07 RX ORDER — BACLOFEN 10 MG/1
10 TABLET ORAL 3 TIMES DAILY
Status: DISCONTINUED | OUTPATIENT
Start: 2024-03-07 | End: 2024-03-07

## 2024-03-07 RX ORDER — TALC
6 POWDER (GRAM) TOPICAL NIGHTLY PRN
Status: DISCONTINUED | OUTPATIENT
Start: 2024-03-07 | End: 2024-03-11 | Stop reason: HOSPADM

## 2024-03-07 RX ORDER — PANTOPRAZOLE SODIUM 40 MG/1
40 TABLET, DELAYED RELEASE ORAL DAILY
Status: DISCONTINUED | OUTPATIENT
Start: 2024-03-07 | End: 2024-03-11 | Stop reason: HOSPADM

## 2024-03-07 RX ORDER — ONDANSETRON HYDROCHLORIDE 2 MG/ML
4 INJECTION, SOLUTION INTRAVENOUS EVERY 8 HOURS PRN
Status: DISCONTINUED | OUTPATIENT
Start: 2024-03-07 | End: 2024-03-11 | Stop reason: HOSPADM

## 2024-03-07 RX ORDER — CALCIUM CARBONATE 200(500)MG
500 TABLET,CHEWABLE ORAL DAILY PRN
Status: DISCONTINUED | OUTPATIENT
Start: 2024-03-07 | End: 2024-03-11 | Stop reason: HOSPADM

## 2024-03-07 RX ORDER — HYDROCODONE BITARTRATE AND ACETAMINOPHEN 7.5; 325 MG/1; MG/1
1 TABLET ORAL EVERY 6 HOURS PRN
Status: DISCONTINUED | OUTPATIENT
Start: 2024-03-07 | End: 2024-03-07

## 2024-03-07 RX ORDER — AMOXICILLIN 250 MG
1 CAPSULE ORAL DAILY PRN
Status: DISCONTINUED | OUTPATIENT
Start: 2024-03-07 | End: 2024-03-11 | Stop reason: HOSPADM

## 2024-03-07 RX ORDER — MAGNESIUM SULFATE HEPTAHYDRATE 40 MG/ML
2 INJECTION, SOLUTION INTRAVENOUS ONCE
Status: COMPLETED | OUTPATIENT
Start: 2024-03-07 | End: 2024-03-07

## 2024-03-07 RX ORDER — SODIUM CHLORIDE 0.9 % (FLUSH) 0.9 %
10 SYRINGE (ML) INJECTION
Status: DISCONTINUED | OUTPATIENT
Start: 2024-03-07 | End: 2024-03-11 | Stop reason: HOSPADM

## 2024-03-07 RX ORDER — POLYETHYLENE GLYCOL 3350 17 G/17G
17 POWDER, FOR SOLUTION ORAL DAILY
Status: DISCONTINUED | OUTPATIENT
Start: 2024-03-07 | End: 2024-03-11 | Stop reason: HOSPADM

## 2024-03-07 RX ORDER — METOPROLOL SUCCINATE 50 MG/1
50 TABLET, EXTENDED RELEASE ORAL 2 TIMES DAILY
Status: DISCONTINUED | OUTPATIENT
Start: 2024-03-07 | End: 2024-03-11 | Stop reason: HOSPADM

## 2024-03-07 RX ORDER — LIDOCAINE 50 MG/G
1 PATCH TOPICAL
Status: DISCONTINUED | OUTPATIENT
Start: 2024-03-07 | End: 2024-03-11 | Stop reason: HOSPADM

## 2024-03-07 RX ORDER — ATORVASTATIN CALCIUM 10 MG/1
20 TABLET, FILM COATED ORAL DAILY
Status: DISCONTINUED | OUTPATIENT
Start: 2024-03-07 | End: 2024-03-11 | Stop reason: HOSPADM

## 2024-03-07 RX ORDER — HYDROCODONE BITARTRATE AND ACETAMINOPHEN 5; 325 MG/1; MG/1
1 TABLET ORAL EVERY 4 HOURS PRN
Status: DISCONTINUED | OUTPATIENT
Start: 2024-03-07 | End: 2024-03-07

## 2024-03-07 RX ORDER — IPRATROPIUM BROMIDE AND ALBUTEROL SULFATE 2.5; .5 MG/3ML; MG/3ML
3 SOLUTION RESPIRATORY (INHALATION)
Status: DISCONTINUED | OUTPATIENT
Start: 2024-03-07 | End: 2024-03-11 | Stop reason: HOSPADM

## 2024-03-07 RX ORDER — ASPIRIN 325 MG
325 TABLET ORAL EVERY OTHER DAY
Status: DISCONTINUED | OUTPATIENT
Start: 2024-03-07 | End: 2024-03-11 | Stop reason: HOSPADM

## 2024-03-07 RX ORDER — ACETAMINOPHEN 325 MG/1
650 TABLET ORAL EVERY 8 HOURS PRN
Status: DISCONTINUED | OUTPATIENT
Start: 2024-03-07 | End: 2024-03-07

## 2024-03-07 RX ADMIN — POTASSIUM BICARBONATE 35 MEQ: 391 TABLET, EFFERVESCENT ORAL at 10:03

## 2024-03-07 RX ADMIN — IPRATROPIUM BROMIDE AND ALBUTEROL SULFATE 3 ML: 2.5; .5 SOLUTION RESPIRATORY (INHALATION) at 01:03

## 2024-03-07 RX ADMIN — SERTRALINE HYDROCHLORIDE 100 MG: 50 TABLET ORAL at 08:03

## 2024-03-07 RX ADMIN — HYDROCODONE BITARTRATE AND ACETAMINOPHEN 1 TABLET: 5; 325 TABLET ORAL at 04:03

## 2024-03-07 RX ADMIN — MELATONIN TAB 3 MG 6 MG: 3 TAB at 01:03

## 2024-03-07 RX ADMIN — PANTOPRAZOLE SODIUM 40 MG: 40 TABLET, DELAYED RELEASE ORAL at 08:03

## 2024-03-07 RX ADMIN — METOPROLOL SUCCINATE 50 MG: 50 TABLET, FILM COATED, EXTENDED RELEASE ORAL at 08:03

## 2024-03-07 RX ADMIN — POLYETHYLENE GLYCOL 3350 17 G: 17 POWDER, FOR SOLUTION ORAL at 08:03

## 2024-03-07 RX ADMIN — MAGNESIUM SULFATE 1 G: 1 INJECTION INTRAVENOUS at 02:03

## 2024-03-07 RX ADMIN — SODIUM CHLORIDE: 9 INJECTION, SOLUTION INTRAVENOUS at 11:03

## 2024-03-07 RX ADMIN — ATORVASTATIN CALCIUM 20 MG: 10 TABLET, FILM COATED ORAL at 08:03

## 2024-03-07 RX ADMIN — LIDOCAINE 1 PATCH: 50 PATCH CUTANEOUS at 02:03

## 2024-03-07 RX ADMIN — IPRATROPIUM BROMIDE AND ALBUTEROL SULFATE 3 ML: 2.5; .5 SOLUTION RESPIRATORY (INHALATION) at 08:03

## 2024-03-07 RX ADMIN — ENOXAPARIN SODIUM 40 MG: 40 INJECTION SUBCUTANEOUS at 04:03

## 2024-03-07 RX ADMIN — HYDROCODONE BITARTRATE AND ACETAMINOPHEN 1 TABLET: 7.5; 325 TABLET ORAL at 10:03

## 2024-03-07 RX ADMIN — POTASSIUM BICARBONATE 35 MEQ: 391 TABLET, EFFERVESCENT ORAL at 08:03

## 2024-03-07 RX ADMIN — IPRATROPIUM BROMIDE AND ALBUTEROL SULFATE 3 ML: 2.5; .5 SOLUTION RESPIRATORY (INHALATION) at 07:03

## 2024-03-07 RX ADMIN — SODIUM CHLORIDE: 9 INJECTION, SOLUTION INTRAVENOUS at 02:03

## 2024-03-07 RX ADMIN — ASPIRIN 325 MG ORAL TABLET 325 MG: 325 PILL ORAL at 08:03

## 2024-03-07 RX ADMIN — CALCIUM CARBONATE (ANTACID) CHEW TAB 500 MG 500 MG: 500 CHEW TAB at 11:03

## 2024-03-07 RX ADMIN — MAGNESIUM SULFATE 2 G: 2 INJECTION INTRAVENOUS at 04:03

## 2024-03-07 RX ADMIN — SODIUM CHLORIDE: 9 INJECTION, SOLUTION INTRAVENOUS at 01:03

## 2024-03-07 RX ADMIN — BISACODYL 10 MG: 5 TABLET, COATED ORAL at 11:03

## 2024-03-07 RX ADMIN — METOPROLOL SUCCINATE 50 MG: 50 TABLET, FILM COATED, EXTENDED RELEASE ORAL at 09:03

## 2024-03-07 NOTE — HPI
Patient presents for evaluation of back pain. She has a known T4 compression fracture but also reports right rib pain after an incident tonight where she felt her knees lock up and eased herself down striking her ribs on a box on the floor. She denies any LOC or head injury during this event or the prior event. She was alert and oriented x4. GCS 15. She reports that she has been barely mobile since her diagnosis of the compression fracture in her back. Also reports decreased intake and inability to care for herself at home. She did receive 100 mcg of fentanyl prior to arrival for pain control and reports mild improvement. She was not received in a TLSO brace. We will obtain lab specimens for further evaluation and perform a chest x-ray to rule out any underlying bony abnormality related to her recent rib injury. We will provide her rehydration with normal saline 1 L bolus IV. She did also have some wheezing noted on exam and reports a prior history of asthma. We will provide her with DuoNeb nebulizer treatment. We will also provide her with a TLSO brace for comfort and pain control. Labs were reviewed and showed a potassium at 3.0. We will provide her with 40 mEq of potassium p.o.. She also reports continued pain despite the fentanyl prior to arrival so we will attempt hydrocodone 5 mg p.o. as she was previously prescribed but unable to obtain. Workup does reveal an elevated white blood cell count but chest x-ray and urine sample showed no signs of infection. She does appear to be dehydrated due to her decreased oral intake since the initial injury. We did discuss the case with Dr. Myers who is agreed to admit the patient at [facility] for impaired mobility in ADLs, intractable pain, hypokalemia, and dehydration.       Above is ER record from 03/06/24.     Mrs. Eldridge is resting in bed this morning. She is awake, alert and oriented. Complains of reflux this morning. States she needs her protonix- already  ordered. Complains of thoracic back pain. Has TLSO brace on. States she wants her muscle relaxer and norco. States norco 5 is not doing anything for her pain. Dc'd norco 5 and baclofen. Increased norco to every 6 hours prn pain. Potassium 3.2- will replace orally.   She states she is not interested in [facility] for rehab. States she wants to discharge home. Talked with her re home environment. States she lives alone but she does have neighbor who can help her if needed. Will have PT screening. She is interested in home health with PT and OT at discharge.   Low grade temp of 100 this morning. Swab for influenza and covid negative. Sat 94% on one liter.

## 2024-03-07 NOTE — PT/OT/SLP EVAL
Physical Therapy Evaluation    Patient Name:  Roselia Eldridge   MRN:  [id number]    Recommendations:     Discharge Recommendations: Moderate Intensity Therapy   Discharge Equipment Recommendations: walker, rolling   Barriers to discharge: Inaccessible home and Decreased caregiver support    Assessment:     Roselia Eldridge is a 78 y.o. female admitted with a medical diagnosis of Dehydration. Patient presented to the emergency department with back pain following a lowered fall to the floor hitting her ribs on a box on the way down. Patient with a known T4 compression fracture. Patient wearing a TLSO brace this morning. She presents with the following impairments/functional limitations: weakness, impaired endurance, impaired sensation, impaired functional mobility, impaired self care skills, gait instability, impaired balance, decreased coordination, decreased lower extremity function, decreased safety awareness, pain. Patient reports that she wants to return home as soon as possible in order to feed her 3 dogs; however, patient is aware that she is unable to care for herself at this time. Patient would benefit from swingbed physical therapy services to maximize functional potential prior to returning home alone.     Rehab Prognosis: Fair; patient would benefit from acute skilled PT services to address these deficits and reach maximum level of function.    Recent Surgery: * No surgery found *      Plan:     During this hospitalization, patient to be seen 5 x/week to address the identified rehab impairments via gait training, therapeutic activities, therapeutic exercises, neuromuscular re-education and progress toward the following goals:    Plan of Care Expires:  03/28/24    Subjective     Chief Complaint: back and rib pain  Patient/Family Comments/goals: Patient's goal is to return home with the assist of her neighbord, Maru, as soon as possible.   Pain/Comfort:  Pain Rating 1: 6/10 (Doc Seals)  Location - Orientation 1:  upper  Location 1: back  Pain Addressed 1: Reposition  Pain Rating Post-Intervention 1: 6/10    Patients cultural, spiritual, Tenriism conflicts given the current situation: no    Living Environment: Patient lives alone in a single story home. Patient has 5 steps to enter home. Patient has 3 dogs that she is passionate about caring for. Prior to admission, patients level of function was independent vs modified independent with intermittent use of a rollator vs single point cane. Equipment used at home: cane, straight, rollator.  DME owned (not currently used): none.  Upon discharge, patient will have assistance from her neighbor, Maru, as needed.    Objective:     Communicated with CNA prior to session. Patient found left sidelying with PureWick, oxygen, peripheral IV  upon PT entry to room.    General Precautions: Standard, fall  Orthopedic Precautions:spinal precautions   Braces: TLSO  Respiratory Status: Nasal cannula, flow 1 L/min    Exams:  Gross Motor Coordination:  impaired for bilateral lower extremities  Postural Exam:  Patient presented with the following abnormalities:    -       Rounded shoulders  -       Forward head  -       Kyphosis  RLE ROM: WFL  RLE Strength: Deficits: 3-/5  LLE ROM: WFL  LLE Strength: Deficits: 3-/5    Functional Mobility:  Bed Mobility:     Supine to Sit: moderate assistance and of 2 persons  Sit to Supine: maximal assistance and of 2 persons  Transfers:     Sit to Stand:  minimum assistance and of 2 persons with rolling walker  Gait: x 3 side steps to the left towards the head of bed with rolling walker with minimal assist of 2 persons; short shuffling steps with difficulty clearing feet; easy fatigue; frequent request to allow her to sit; loss of balance to the left  Balance: minimal assist with static standing balance with rolling walker; minimal assist of 2 persons with dynamic standing balance with rolling walker    AM-PAC 6 CLICK MOBILITY  Total Score:11     Treatment &  Education:    Bed mobility, transfers, and gait performed as listed above. Patient educated on the physical therapy plan of care and the importance of receiving physical therapy treatment prior to discharging home.     Patient left supine with Lyric Ring OT getting patient settled.     GOALS:   Multidisciplinary Problems       Physical Therapy Goals          Problem: Physical Therapy    Goal Priority Disciplines Outcome Goal Variances Interventions   Physical Therapy Goal     PT, PT/OT Ongoing, Progressing     Description: Short-term Goals: 1.5 weeks  1. Patient will perform supine to/from sit with minimal assistance x 2 people to improve independence and safety with bed mobility.  2. Patient will perform sit to/from stand with a rolling walker with minimal assistance to improve independence and safety with transfers.  3. Patient will ambulate 50 feet with a rolling walker with minimal assistance to improve independence and safety with gait.  4. Patient will tolerate 15 minutes of physical therapy intervention to improve endurance and activity tolerance.    Long-term goals: 3 weeks  1. Patient will perform supine to/from sit with minimal assistance to improve independence and safety with bed mobility.  2. Patient will perform sit to/from stand with a rolling walker with contact guard assist to improve independence and safety with transfers.  3. Patient will ambulate 100 feet with a rolling walker with contact guard assist to improve independence and safety with gait.  4. Patient will tolerate 30 minutes of physical therapy intervention to improve endurance and activity tolerance.                       History:     Past Medical History:   Diagnosis Date    Asthma     Hypertension     Mixed hyperlipidemia      Past Surgical History:   Procedure Laterality Date    COSMETIC SURGERY       Time Tracking:     PT Received On: 03/07/24  PT Start Time: 1050     PT Stop Time: 1109  PT Total Time (min): 19 min     Billable  Minutes: Evaluation (moderate complexity; 19 minutes)      03/07/2024

## 2024-03-07 NOTE — PT/OT/SLP EVAL
Occupational Therapy   Evaluation    Name: Roselia Eldridge  MRN: [id number]  Admitting Diagnosis:  Dehydration       Recommendations:     Discharge Recommendations: Moderate Intensity Therapy  Discharge Equipment Recommendations:  walker, rolling  Barriers to discharge:  None    Assessment:     Roselia Eldridge is a 78 y.o. female with a medical diagnosis of Dehydration.  She presents with weakness and decline with ADLs. Performance deficits affecting function are weakness, impaired endurance, impaired sensation, impaired functional mobility, impaired self care skills, gait instability, impaired balance, decreased coordination, decreased lower extremity function, decreased upper extremity function, decreased safety awareness, decreased ROM, orthopedic precautions, impaired cardiopulmonary response to activity, pain.     Rehab Prognosis:  Good; patient would benefit from acute skilled OT services to address these deficits and reach maximum level of function.       Plan:     Patient to be seen   to address the above listed problems via self-care/home management, therapeutic activities, therapeutic exercises  Plan of Care Expires: 04/06/24  Plan of Care Reviewed with: patient    Subjective     Chief Complaint: Pain and weakness  Patient/Family Comments/goals: to return to prior level of function  Pain/Comfort:  Pain Rating 1: 6/10  Location - Orientation 1: upper  Location 1: back  Pain Addressed 1: Reposition  Pain Rating Post-Intervention 1: 5/10  Pain Rating 2: 0/10    Objective:     Communicated with: Nurse prior to session.  Patient found supine with PureWick, oxygen, peripheral IV upon OT entry to room.    General Precautions: Standard, fall    Orthopedic Precautions:spinal precautions  Braces: TLSO  Respiratory Status: Room air     Occupational Performance:     Bed Mobility:    Patient completed Rolling/Turning to Left with  maximal assistance and 2 persons  Patient completed Rolling/Turning to Right with maximal  assistance and 2 persons  Patient completed Scooting/Bridging with moderate assistance and 2 persons  Patient completed Supine to Sit with maximal assistance and 2 persons  Patient completed Sit to Supine with maximal assistance and 2 persons     Functional Mobility/Transfers:  Patient completed Sit <> Stand Transfer with minimum assistance and of 2 persons  with  rolling walker   Functional Mobility: Patient side stepped to head of bed with Min A x 2 with no unsafe fatigue    Activities of Daily Living:  Grooming: minimum assistance to perform hygiene  Upper Body Dressing: maximal assistance to nora TLSO  Lower Body Dressing: total assistance to nora socks      [facility] 6 Click ADL: 11    Treatment & Education:  Pt educated on OT role/POC.   Importance of OOB activity with staff assistance.  Importance of sitting up in the chair throughout the day as tolerated, especially for meals   Safety during functional t/f and mobility  Importance of assisting with self-care activities      Patient left HOB elevated with all lines intact, call button in reach, bed alarm on, and chair alarm on    GOALS:   Multidisciplinary Problems       Occupational Therapy Goals          Problem: Occupational Therapy    Goal Priority Disciplines Outcome Interventions   Occupational Therapy Goal     OT, PT/OT Ongoing, Progressing    Description: Grooming Status:   Short Term Goal: Pt will perform grooming with Min A sitting EOB.   Long Term Goal: Pt will perform grooming/oral hygiene standing at sink with SBA      LE dressing Status:   Short Term Goal: Pt will perform LE dressing with Mod A using AD.   Long Term Goal: Pt will perform LE dressing with Min A using AD.    Toileting Status:   Short Term Goal: Pt will perform toilet hygiene on BSC with Mod A.  Long Term Goal: Pt will perform toilet hygiene on toilet with no AE with Min A.    Commode Transfer:   Short Term Goal: Pt will perform BSC t/f with Mod A.  Long Term Goal:  Pt will perform  toilet t/f in bathroom with Min A.     Bathing Status:   Long Term Goal: Pt will perform sponge bath with Mod A with AD prn and no unsafe fatigue.     Strength Status: 4/5 BUEs  Long Term Goal: Pt to perform BUE strengthening with weights and/or body weight to increase ADL independence and safety    Endurance Status:   Short Term Goal:pt to perform 15 min OT treatment with 5 or greater rest breaks  Long Term Goal: pt to perform 30 min OT treat with 3 or less rest breaks                          History:     Past Medical History:   Diagnosis Date    Asthma     Hypertension     Mixed hyperlipidemia          Past Surgical History:   Procedure Laterality Date    COSMETIC SURGERY         Time Tracking:     OT Date of Treatment: 03/07/24  OT Start Time: 1045  OT Stop Time: 1114  OT Total Time (min): 29 min    Billable Minutes:Evaluation Moderate complexity      JANENE Garibay/L, CSRS  3/7/2024

## 2024-03-07 NOTE — PLAN OF CARE
[facility] - Medical Surgical Unit  Initial Discharge Assessment       Primary Care Provider: Pamela Jurado DO    Admission Diagnosis: Wheezing [R06.2]  Dehydration [E86.0]  Hypokalemia [E87.6]  Frequent falls [R29.6]  Impaired mobility and ADLs [Z74.09, Z78.9]  Intractable pain [R52]  Acute midline back pain, unspecified back location [M54.9]  Compression fracture of T4 vertebra with delayed healing, subsequent encounter [S22.040G]    Admission Date: 3/6/2024  Expected Discharge Date:          Payor: MEDICARE / Plan: MEDICARE PART A & B / Product Type: Government /     Extended Emergency Contact Information  Primary Emergency Contact: Nish Tenorio  Mobile Phone: [phone]  Relation: Friend  Preferred language: English   needed? No    Discharge Plan A: (P) Home, Home Health         [facility] [address] [address]  Phone: [phone] Fax: [phone]      Initial Assessment (most recent)       Adult Discharge Assessment - 03/07/24 1350          Discharge Assessment    Assessment Type Discharge Planning Assessment (P)      Confirmed/corrected address, phone number and insurance Yes (P)      Confirmed Demographics Correct on Facesheet (P)      Source of Information patient (P)      Does patient/caregiver understand observation status No (P)      Was observation education provided? Yes (P)      Communicated MARILEE with patient/caregiver Date not available/Unable to determine (P)      Reason For Admission Dehydration (P)      People in Home alone (P)      Do you expect to return to your current living situation? Yes (P)      Do you have help at home or someone to help you manage your care at home? No (P)      Current cognitive status: Inappropriate Behavior (P)      Walking or Climbing Stairs Difficulty yes (P)      Walking or Climbing Stairs ambulation difficulty, assistance 1 person;ambulation difficulty, requires equipment;transferring  difficulty, assistance 1 person;transferring difficulty, requires equipment (P)      Dressing/Bathing Difficulty yes (P)      Dressing/Bathing bathing difficulty, assistance 1 person;dressing difficulty, assistance 1 person (P)      Home Accessibility stairs within home (P)      Equipment Currently Used at Home bedside commode;cane, straight;rollator;wheelchair (P)      Patient currently being followed by outpatient case management? No (P)      Do you currently have service(s) that help you manage your care at home? No (P)      Do you take prescription medications? Yes (P)      Do you have prescription coverage? Yes (P)      Coverage Medicare A & B (P)      Do you have any problems affording any of your prescribed medications? No (P)      Is the patient taking medications as prescribed? yes (P)      Are you on dialysis? No (P)      Do you take coumadin? No (P)      Discharge Plan A Home;Home Health (P)      DME Needed Upon Discharge  none (P)      Discharge Plan discussed with: Patient (P)         Physical Activity    On average, how many days per week do you engage in moderate to strenuous exercise (like a brisk walk)? Patient declined (P)      On average, how many minutes do you engage in exercise at this level? Patient declined (P)         Financial Resource Strain    How hard is it for you to pay for the very basics like food, housing, medical care, and heating? Patient declined (P)         Housing Stability    In the last 12 months, was there a time when you were not able to pay the mortgage or rent on time? Patient declined (P)      In the last 12 months, was there a time when you did not have a steady place to sleep or slept in a shelter (including now)? Patient declined (P)         Transportation Needs    In the past 12 months, has lack of transportation kept you from medical appointments or from getting medications? Patient declined (P)      In the past 12 months, has lack of transportation kept you from  meetings, work, or from getting things needed for daily living? Patient declined (P)         Food Insecurity    Within the past 12 months, you worried that your food would run out before you got the money to buy more. Patient declined (P)      Within the past 12 months, the food you bought just didn't last and you didn't have money to get more. Patient declined (P)         Stress    Do you feel stress - tense, restless, nervous, or anxious, or unable to sleep at night because your mind is troubled all the time - these days? Patient declined (P)         Social Connections    In a typical week, how many times do you talk on the phone with family, friends, or neighbors? Patient declined (P)      How often do you get together with friends or relatives? Patient declined (P)      How often do you attend Mormonism or Anabaptist services? Patient declined (P)      Do you belong to any clubs or organizations such as Mormonism groups, unions, fraternal or athletic groups, or school groups? Patient declined (P)      How often do you attend meetings of the clubs or organizations you belong to? Patient declined (P)      Are you , , , , never , or living with a partner? Patient declined (P)         Alcohol Use    Q1: How often do you have a drink containing alcohol? Patient declined (P)      Q2: How many drinks containing alcohol do you have on a typical day when you are drinking? Patient declined (P)      Q3: How often do you have six or more drinks on one occasion? Patient declined (P)                    Attempted to perform assessment patient would not stay on task with thinking process. Will attempt assessment 03/08/2024. Asked Ms. Eldridge if she had home health and she's unable to answer question.

## 2024-03-07 NOTE — ED PROVIDER NOTES
Encounter Date: 3/6/2024       History     Chief Complaint   Patient presents with    Back Pain     Pt arrives per EMS with c/o difficulty walking and back pain s/p T4 fx 2 days ago     Patient is a 78-year-old female who presents to the emergency department by EMS for evaluation of back pain.  She was evaluated here 2 days prior and diagnosed with a T4 compression fracture.  She reported to staff at that time that she had a TLSO brace at home and had plan to follow up with her healthcare providers and [place].  She reports that since that time the pain has gotten worse and she was too uncomfortable to follow up with her back specialist as plan.  She also reports that she was unable to obtain the prescription for the hydrocodone and has been taking her previously prescribed baclofen and tramadol with only minimal improvement.  She reports that she got up this afternoon and felt her knees lock while she was holding onto the rail.  She denies any fall but reports that she did ease herself to the ground where she struck her ribs on a box.  She denies any head injury, loss of consciousness, difficulty breathing, syncope, or any other complaints.  EMS did place IV access and provide her with 100 mcg of fentanyl prior to arrival.  Her blood pressure is 163/84, temperature 97.7°, heart rate 106, respirations 18, and oxygen saturation 96% on room air.     The history is provided by the patient.     Review of patient's allergies indicates:   Allergen Reactions    Pentazocine Nausea Only    Talwin compound Nausea And Vomiting     Past Medical History:   Diagnosis Date    Asthma     Hypertension     Mixed hyperlipidemia      Past Surgical History:   Procedure Laterality Date    COSMETIC SURGERY       History reviewed. No pertinent family history.  Social History     Tobacco Use    Smoking status: Never    Smokeless tobacco: Never   Substance Use Topics    Alcohol use: Yes     Comment: daily    Drug use: Never     Review of  Systems   Constitutional:  Positive for appetite change (Decreased). Negative for chills and fever.   HENT:  Negative for congestion, sore throat and trouble swallowing.    Eyes: Negative.    Respiratory:  Positive for wheezing. Negative for cough, chest tightness and shortness of breath.    Cardiovascular:  Negative for chest pain, palpitations and leg swelling.   Gastrointestinal:  Negative for abdominal pain, diarrhea, nausea and vomiting.   Endocrine: Negative.    Genitourinary:  Negative for difficulty urinating and dysuria.   Musculoskeletal:  Positive for arthralgias and back pain. Negative for neck pain and neck stiffness.   Skin:  Negative for color change, pallor and rash.   Allergic/Immunologic: Negative.    Neurological:  Positive for weakness (generalized). Negative for dizziness, syncope, facial asymmetry, speech difficulty, light-headedness and headaches.   Psychiatric/Behavioral:  Negative for confusion. The patient is not nervous/anxious.    All other systems reviewed and are negative.      Physical Exam     Initial Vitals [03/06/24 2225]   BP Pulse Resp Temp SpO2   (!) 163/84 106 18 97.7 °F (36.5 °C) 96 %      MAP       --         Physical Exam    Nursing note and vitals reviewed.  Constitutional: She is not diaphoretic. She is cooperative.   HENT:   Head: Normocephalic and atraumatic.   Right Ear: External ear normal.   Left Ear: External ear normal.   Mouth/Throat: Uvula is midline. Mucous membranes are dry. No posterior oropharyngeal edema or posterior oropharyngeal erythema.   Eyes: Conjunctivae and EOM are normal. Pupils are equal, round, and reactive to light.   Neck: Neck supple.   Normal range of motion.  Cardiovascular:  Regular rhythm and normal pulses.   Tachycardia present.         Pulmonary/Chest: Effort normal. No respiratory distress. She has no decreased breath sounds. She has wheezes (bilateral). She has no rhonchi. She has no rales.     Abdominal: Abdomen is soft. Bowel sounds are  normal. She exhibits no distension. There is no abdominal tenderness. There is no rebound and no guarding.   Musculoskeletal:         General: Normal range of motion.        Arms:       Cervical back: Normal, normal range of motion and neck supple.      Thoracic back: Tenderness present.      Lumbar back: Normal.     Neurological: She is alert and oriented to person, place, and time. She has normal strength. Gait abnormal. Coordination normal. GCS score is 15. GCS eye subscore is 4. GCS verbal subscore is 5. GCS motor subscore is 6.   Skin: Skin is warm and dry. Capillary refill takes less than 2 seconds.   Psychiatric: She has a normal mood and affect. Her behavior is normal. Judgment and thought content normal.         Medical Screening Exam   See Full Note    ED Course   Procedures  Labs Reviewed   COMPREHENSIVE METABOLIC PANEL - Abnormal; Notable for the following components:       Result Value    Sodium 134 (*)     Potassium 3.0 (*)     Chloride 95 (*)     Glucose 127 (*)     BUN 20 (*)     BUN/Creatinine Ratio 26 (*)     Albumin 2.7 (*)     Globulin 5.1 (*)     AST 48 (*)     All other components within normal limits   CBC WITH DIFFERENTIAL - Abnormal; Notable for the following components:    WBC 15.81 (*)     RBC 3.36 (*)     Hemoglobin 10.7 (*)     Hematocrit 32.2 (*)     MCH 31.8 (*)     MPV 9.3 (*)     Neutrophils % 86.2 (*)     Lymphocytes % 5.6 (*)     Neutrophils, Abs 13.63 (*)     Lymphocytes, Absolute 0.89 (*)     Monocytes % 8.0 (*)     Eosinophils % 0.1 (*)     Monocytes, Absolute 1.27 (*)     All other components within normal limits   URINALYSIS, REFLEX TO URINE CULTURE - Abnormal; Notable for the following components:    Protein,  (*)     Ketones, UA 40 (*)     Bilirubin, UA Small (*)     Blood, UA Small (*)     All other components within normal limits   CBC W/ AUTO DIFFERENTIAL    Narrative:     The following orders were created for panel order CBC auto differential.  Procedure                                Abnormality         Status                     ---------                               -----------         ------                     CBC with Differential[[id number]]       Abnormal            Final result                 Please view results for these tests on the individual orders.   URINALYSIS, MICROSCOPIC          Imaging Results              X-Ray Chest AP Portable (In process)                      Medications   sodium chloride 0.9% bolus 1,000 mL 1,000 mL (1,000 mLs Intravenous New Bag 3/6/24 2240)   albuterol-ipratropium 2.5 mg-0.5 mg/3 mL nebulizer solution 3 mL (3 mLs Nebulization Given by Other 3/6/24 2250)   HYDROcodone-acetaminophen 5-325 mg per tablet 1 tablet (1 tablet Oral Given 3/6/24 2320)   potassium chloride SA CR tablet 40 mEq (40 mEq Oral Given 3/6/24 2319)     Medical Decision Making  Patient presents for evaluation of back pain.  She has a known T4 compression fracture but also reports right rib pain after an incident tonight where she felt her knees lock up and eased herself down striking her ribs on a box on the floor.  She denies any LOC or head injury during this event or the prior event.  She was alert and oriented x4.  GCS 15.  She reports that she has been barely mobile since her diagnosis of the compression fracture in her back.  Also reports decreased intake and inability to care for herself at home.  She did receive 100 mcg of fentanyl prior to arrival for pain control and reports mild improvement.  She was not received in a TLSO brace.  We will obtain lab specimens for further evaluation and perform a chest x-ray to rule out any underlying bony abnormality related to her recent rib injury.  We will provide her rehydration with normal saline 1 L bolus IV.  She did also have some wheezing noted on exam and reports a prior history of asthma.  We will provide her with DuoNeb nebulizer treatment.  We will also provide her with a TLSO brace for comfort and pain control.   Labs were reviewed and showed a potassium at 3.0.  We will provide her with 40 mEq of potassium p.o..  She also reports continued pain despite the fentanyl prior to arrival so we will attempt hydrocodone 5 mg p.o. as she was previously prescribed but unable to obtain.  Workup does reveal an elevated white blood cell count but chest x-ray and urine sample showed no signs of infection.  She does appear to be dehydrated due to her decreased oral intake since the initial injury.  We did discuss the case with Dr. Myers who is agreed to admit the patient at [facility] for impaired mobility in ADLs, intractable pain, hypokalemia, and dehydration.     Amount and/or Complexity of Data Reviewed  Independent Historian:      Details: Patient is a 78-year-old female who presents to the emergency department by EMS for evaluation of back pain.  She was evaluated here 2 days prior and diagnosed with a T4 compression fracture.  She reported to staff at that time that she had a TLSO brace at home and had plan to follow up with her healthcare providers and [place].  She reports that since that time the pain has gotten worse and she was too uncomfortable to follow up with her back specialist as plan.  She also reports that she was unable to obtain the prescription for the hydrocodone and has been taking her previously prescribed baclofen and tramadol with only minimal improvement.  She reports that she got up this afternoon and felt her knees lock while she was holding onto the rail.  She denies any fall but reports that she did ease herself to the ground where she struck her ribs on a box.  She denies any head injury, loss of consciousness, difficulty breathing, syncope, or any other complaints.  EMS did place IV access and provide her with 100 mcg of fentanyl prior to arrival.  Her blood pressure is 163/84, temperature 97.7°, heart rate 106, respirations 18, and oxygen saturation 96% on room air.   Labs: ordered.  Decision-making details documented in ED Course.     Details: CBC shows a WBC of 15.81, hemoglobin of 10.7, hematocrit 32.2, and a platelet count 235.  CMP shows a sodium of 134, potassium at 3.0, chloride 95, glucose 127, BUN 20, BUN/creatinine ratio 26, albumin of 2.7, globulin of 5.1, and AST of 48 but otherwise unremarkable.  Urinalysis shows 100 of protein, 40 ketones, small bilirubin, small blood.  Radiology: ordered.     Details: Preliminary findings on the chest x-ray showed no focal consolidation, pleural effusion, or pneumothorax.  No cardiomegaly.  Calcified aorta.    Risk  Prescription drug management.  Risk Details: All historical, clinical, radiographic, and laboratory findings were reviewed with the patient/family in detail along with the indications for transport to the facility at [facility] in order to receive further evaluation and treatment along with PT, OT, and  consult.  All remaining questions and concerns were addressed at this time and the patient/family communicates understanding and agrees to proceed accordingly.  Similarly, all pertinent details of the encounter were discussed with Dr. Myers who agrees to receive the patient at [facility] for further care as outlined above.    LESLEE SANTAMARIA  11:40 PM               ED Course as of 03/06/24 2343   Wed Mar 06, 2024   2317 WBC(!): 15.81 [MC]   2318 Potassium(!): 3.0 [MC]   2318 Hemoglobin(!): 10.7 [MC]   2318 Hematocrit(!): 32.2 [MC]   2318 Neutrophils Relative(!): 86.2 [MC]      ED Course User Index  [MC] Dennis Reyes FNP                           Clinical Impression:   Final diagnoses:  [Z74.09, Z78.9] Impaired mobility and ADLs (Primary)  [M54.9] Acute midline back pain, unspecified back location  [S22.040G] Compression fracture of T4 vertebra with delayed healing, subsequent encounter  [R29.6] Frequent falls  [R06.2] Wheezing  [R52] Intractable pain  [E87.6] Hypokalemia  [E86.0] Dehydration                Dennis Reyes, [facility]  03/06/24 5153

## 2024-03-07 NOTE — SUBJECTIVE & OBJECTIVE
Past Medical History:   Diagnosis Date    Asthma     Hypertension     Mixed hyperlipidemia        Past Surgical History:   Procedure Laterality Date    COSMETIC SURGERY         Review of patient's allergies indicates:   Allergen Reactions    Pentazocine Nausea Only    Talwin compound Nausea And Vomiting       No current facility-administered medications on file prior to encounter.     Current Outpatient Medications on File Prior to Encounter   Medication Sig    albuterol (PROVENTIL/VENTOLIN HFA) 90 mcg/actuation inhaler Inhale 2 puffs into the lungs every 6 (six) hours as needed.    aspirin 325 MG tablet Take 325 mg by mouth every other day.    baclofen (LIORESAL) 10 MG tablet Take 10 mg by mouth 3 (three) times daily.    chlorthalidone (HYGROTEN) 25 MG Tab Take 25 mg by mouth.    fluticasone-salmeterol diskus inhaler 250-50 mcg Inhale 1 puff into the lungs.    HYDROcodone-acetaminophen (NORCO) 5-325 mg per tablet Take 1 tablet by mouth every 4 (four) hours as needed for Pain.    metoprolol succinate (TOPROL-XL) 50 MG 24 hr tablet Take 1 tablet by mouth 2 (two) times daily.    omeprazole (PRILOSEC) 20 MG capsule Take 20 mg by mouth once daily.    oxybutynin (DITROPAN-XL) 10 MG 24 hr tablet Take 1 tablet by mouth 2 (two) times daily.    sertraline (ZOLOFT) 100 MG tablet Take 100 mg by mouth.    simvastatin (ZOCOR) 40 MG tablet Take 1 tablet by mouth every evening.    traMADoL (ULTRAM) 50 mg tablet Take 50 mg by mouth every 6 (six) hours as needed.    triamcinolone acetonide 0.1% (KENALOG) 0.1 % cream Apply topically.    vitamin D (VITAMIN D3) 1000 units Tab Take 5,000 Units by mouth.     Family History    None       Tobacco Use    Smoking status: Never    Smokeless tobacco: Never   Substance and Sexual Activity    Alcohol use: Yes     Comment: daily    Drug use: Never    Sexual activity: Not on file     Review of Systems   Constitutional:  Positive for appetite change. Negative for fatigue.   HENT:  Negative for  sinus pressure, sinus pain, sore throat and trouble swallowing.    Eyes:  Negative for discharge and itching.   Respiratory:  Negative for cough and shortness of breath.    Cardiovascular:  Positive for palpitations. Negative for chest pain and leg swelling.   Gastrointestinal:  Negative for abdominal distention, abdominal pain, constipation, diarrhea, nausea and vomiting.        Reports last BM  3 days ago - states she uses miralax and glycerin at home- states sometimes she has to manually remove stool   Genitourinary:  Negative for dysuria.        Reports having occasional urinary incontinence   Musculoskeletal:  Positive for arthralgias and back pain. Negative for neck pain and neck stiffness.        Thoracic back pain    Skin:  Negative for color change, pallor, rash and wound.   Neurological:  Positive for weakness. Negative for light-headedness and headaches.     Objective:     Vital Signs (Most Recent):  Temp: 97.5 °F (36.4 °C) (03/07/24 0717)  Pulse: 82 (03/07/24 0743)  Resp: 18 (03/07/24 0743)  BP: 136/67 (03/07/24 0717)  SpO2: (!) 92 % (03/07/24 0743) Vital Signs (24h Range):  Temp:  [97.5 °F (36.4 °C)-98.4 °F (36.9 °C)] 97.5 °F (36.4 °C)  Pulse:  [] 82  Resp:  [18-20] 18  SpO2:  [90 %-98 %] 92 %  BP: (116-163)/(56-84) 136/67     Weight: 90.7 kg (200 lb)  Body mass index is 33.28 kg/m².     Physical Exam  HENT:      Head: Normocephalic.      Mouth/Throat:      Mouth: Mucous membranes are moist.   Cardiovascular:      Rate and Rhythm: Normal rate and regular rhythm.      Pulses: Normal pulses.      Heart sounds: Normal heart sounds. No murmur heard.     No friction rub. No gallop.   Pulmonary:      Effort: Pulmonary effort is normal. No respiratory distress.      Breath sounds: Normal breath sounds. No stridor. No wheezing, rhonchi or rales.      Comments: Sat on 2 liters is 94%  Dropped to 89% on room air  Chest:      Chest wall: No tenderness.   Abdominal:      General: Bowel sounds are normal.  There is no distension.      Palpations: Abdomen is soft. There is no mass.      Tenderness: There is no abdominal tenderness. There is no guarding or rebound.      Hernia: No hernia is present.   Musculoskeletal:      Cervical back: Normal range of motion.      Comments: Thoracic back pain - tlso brace on   Skin:     General: Skin is warm and dry.   Neurological:      Mental Status: She is alert and oriented to person, place, and time.      Motor: Weakness present.   Psychiatric:         Mood and Affect: Mood normal.                Significant Labs: All pertinent labs within the past 24 hours have been reviewed.  Recent Lab Results  (Last 5 results in the past 24 hours)        03/07/24  0925   03/07/24  0829   03/07/24  0605   03/06/24  2332   03/06/24  2254        Influenza A, Molecular Negative               Influenza B, Molecular Negative               Albumin/Globulin Ratio         0.5       Albumin         2.7       ALP         87       ALT         28       Anion Gap     9     12       Appearance, UA       Clear         AST         48       Bacteria, UA       Few         Baso #     0.01     0.01       Basophil %     0.1     0.1       Bilirubin (UA)       Small         BILIRUBIN TOTAL         0.5       BUN     18     20       BUN/CREAT RATIO     24     26       Calcium     8.8     9.4       Chloride     96     95       CO2     30     30       Color, UA       Yellow         ID NOW COVID-19, (SARAN) Negative               Creatinine     0.74     0.78       Differential Method     Auto     Auto       eGFR     83     78       Eos #     0.03     0.01       Eos %     0.2     0.1       Globulin, Total         5.1       Glucose     110     127       Glucose, UA       Negative         Hematocrit     29.6     32.2       Hemoglobin     9.8     10.7       Hyaline Casts, UA       0-2         Ketones, UA       40         Leukocyte Esterase, UA       Negative         Lymph #     0.92     0.89       Lymph %     6.7     5.6        MCH     31.9     31.8       MCHC     33.1     33.2       MCV     96.4     95.8       Mono #     1.26     1.27       Mono %     9.1     8.0       MPV     9.4     9.3       Mucus, UA       Many         Neutrophils, Abs     11.58     13.63       Neutrophils Relative     83.9     86.2       NITRITE UA       Negative         Blood, UA       Small         pH, UA       6.0         Platelet Count     235     235       POC Glucose   108             Potassium     3.2     3.0       PROTEIN TOTAL         7.8       Protein, UA       100         RBC     3.07     3.36       RBC, UA       3-5         RDW     12.2     12.1       Sodium     132     134       Specific [place], UA       1.025         Squam Epithel, UA       None Seen         UROBILINOGEN UA       0.2         WBC, UA       0-5         WBC     13.80     15.81       Yeast, UA       Occasional                                Significant Imaging: I have reviewed all pertinent imaging results/findings within the past 24 hours.

## 2024-03-07 NOTE — SUBJECTIVE & OBJECTIVE
Interval History:  No acute events overnight    Review of Systems   Constitutional:  Positive for appetite change. Negative for fatigue.   HENT:  Negative for sinus pressure, sinus pain, sore throat and trouble swallowing.    Eyes:  Negative for discharge and itching.   Respiratory:  Negative for cough and shortness of breath.    Cardiovascular:  Positive for palpitations. Negative for chest pain and leg swelling.   Gastrointestinal:  Negative for abdominal distention, abdominal pain, constipation, diarrhea, nausea and vomiting.        Reports last BM  3 days ago - states she uses miralax and glycerin at home- states sometimes she has to manually remove stool   Genitourinary:  Negative for dysuria.        Reports having occasional urinary incontinence   Musculoskeletal:  Positive for arthralgias and back pain. Negative for neck pain and neck stiffness.        Thoracic back pain    Skin:  Negative for color change, pallor, rash and wound.   Neurological:  Positive for weakness. Negative for light-headedness and headaches.     Objective:     Vital Signs (Most Recent):  Temp: 97.5 °F (36.4 °C) (03/07/24 0717)  Pulse: 82 (03/07/24 0743)  Resp: 18 (03/07/24 1021)  BP: 136/67 (03/07/24 0717)  SpO2: (!) 92 % (03/07/24 0743) Vital Signs (24h Range):  Temp:  [97.5 °F (36.4 °C)-98.4 °F (36.9 °C)] 97.5 °F (36.4 °C)  Pulse:  [] 82  Resp:  [18-20] 18  SpO2:  [90 %-98 %] 92 %  BP: (116-163)/(56-84) 136/67     Weight: 90.7 kg (200 lb)  Body mass index is 33.28 kg/m².    Intake/Output Summary (Last 24 hours) at 3/7/2024 1122  Last data filed at 3/7/2024 0921  Gross per 24 hour   Intake 480 ml   Output 400 ml   Net 80 ml         Physical Exam  HENT:      Head: Normocephalic.      Mouth/Throat:      Mouth: Mucous membranes are moist.   Cardiovascular:      Rate and Rhythm: Normal rate and regular rhythm.      Pulses: Normal pulses.      Heart sounds: Normal heart sounds. No murmur heard.     No friction rub. No gallop.    Pulmonary:      Effort: Pulmonary effort is normal. No respiratory distress.      Breath sounds: Normal breath sounds. No stridor. No wheezing, rhonchi or rales.      Comments: Sat on 2 liters is 94%  Dropped to 89% on room air  Chest:      Chest wall: No tenderness.   Abdominal:      General: Bowel sounds are normal. There is no distension.      Palpations: Abdomen is soft. There is no mass.      Tenderness: There is no abdominal tenderness. There is no guarding or rebound.      Hernia: No hernia is present.   Musculoskeletal:      Cervical back: Normal range of motion.      Comments: Thoracic back pain - tlso brace on   Skin:     General: Skin is warm and dry.   Neurological:      Mental Status: She is alert and oriented to person, place, and time.      Motor: Weakness present.   Psychiatric:         Mood and Affect: Mood normal.             Significant Labs: All pertinent labs within the past 24 hours have been reviewed.  Recent Lab Results  (Last 5 results in the past 24 hours)        03/07/24  0925   03/07/24  0829   03/07/24  0605   03/06/24  2332   03/06/24  2254        Influenza A, Molecular Negative               Influenza B, Molecular Negative               Albumin/Globulin Ratio         0.5       Albumin         2.7       ALP         87       ALT         28       Anion Gap     9     12       Appearance, UA       Clear         AST         48       Bacteria, UA       Few         Baso #     0.01     0.01       Basophil %     0.1     0.1       Bilirubin (UA)       Small         BILIRUBIN TOTAL         0.5       BUN     18     20       BUN/CREAT RATIO     24     26       Calcium     8.8     9.4       Chloride     96     95       CO2     30     30       Color, UA       Yellow         ID NOW COVID-19, (SARAN) Negative               Creatinine     0.74     0.78       Differential Method     Auto     Auto       eGFR     83     78       Eos #     0.03     0.01       Eos %     0.2     0.1       Globulin, Total          5.1       Glucose     110     127       Glucose, UA       Negative         Hematocrit     29.6     32.2       Hemoglobin     9.8     10.7       Hyaline Casts, UA       0-2         Ketones, UA       40         Leukocyte Esterase, UA       Negative         Lymph #     0.92     0.89       Lymph %     6.7     5.6       MCH     31.9     31.8       MCHC     33.1     33.2       MCV     96.4     95.8       Mono #     1.26     1.27       Mono %     9.1     8.0       MPV     9.4     9.3       Mucus, UA       Many         Neutrophils, Abs     11.58     13.63       Neutrophils Relative     83.9     86.2       NITRITE UA       Negative         Blood, UA       Small         pH, UA       6.0         Platelet Count     235     235       POC Glucose   108             Potassium     3.2     3.0       PROTEIN TOTAL         7.8       Protein, UA       100         RBC     3.07     3.36       RBC, UA       3-5         RDW     12.2     12.1       Sodium     132     134       Specific [place], UA       1.025         Squam Epithel, UA       None Seen         UROBILINOGEN UA       0.2         WBC, UA       0-5         WBC     13.80     15.81       Yeast, UA       Occasional                                Significant Imaging: I have reviewed all pertinent imaging results/findings within the past 24 hours.

## 2024-03-07 NOTE — PROGRESS NOTES
[facility] - Medical Surgical Unit  Hospital Medicine  Progress Note    Patient Name: Roselia Eldridge  MRN: [id number]  Patient Class: OP- Observation   Admission Date: 3/6/2024  Length of Stay: 0 days  Attending Physician: Galindo De Jesus IV, DO  Primary Care Provider: Pamela Jurado DO        Subjective:     Principal Problem:Dehydration        HPI:  Patient presents for evaluation of back pain. She has a known T4 compression fracture but also reports right rib pain after an incident tonight where she felt her knees lock up and eased herself down striking her ribs on a box on the floor. She denies any LOC or head injury during this event or the prior event. She was alert and oriented x4. GCS 15. She reports that she has been barely mobile since her diagnosis of the compression fracture in her back. Also reports decreased intake and inability to care for herself at home. She did receive 100 mcg of fentanyl prior to arrival for pain control and reports mild improvement. She was not received in a TLSO brace. We will obtain lab specimens for further evaluation and perform a chest x-ray to rule out any underlying bony abnormality related to her recent rib injury. We will provide her rehydration with normal saline 1 L bolus IV. She did also have some wheezing noted on exam and reports a prior history of asthma. We will provide her with DuoNeb nebulizer treatment. We will also provide her with a TLSO brace for comfort and pain control. Labs were reviewed and showed a potassium at 3.0. We will provide her with 40 mEq of potassium p.o.. She also reports continued pain despite the fentanyl prior to arrival so we will attempt hydrocodone 5 mg p.o. as she was previously prescribed but unable to obtain. Workup does reveal an elevated white blood cell count but chest x-ray and urine sample showed no signs of infection. She does appear to be dehydrated due to her decreased oral intake since the initial injury. We  did discuss the case with Dr. Myers who is agreed to admit the patient at [facility] for impaired mobility in ADLs, intractable pain, hypokalemia, and dehydration.       Above is ER record from 03/06/24.     Mrs. Eldridge is resting in bed this morning. She is awake, alert and oriented. Complains of reflux this morning. States she needs her protonix- already ordered. Complains of thoracic back pain. Has TLSO brace on. States she wants her muscle relaxer and norco. States norco 5 is not doing anything for her pain. Dc'd norco 5 and baclofen. Increased norco to every 6 hours prn pain. Potassium 3.2- will replace orally.   She states she is not interested in swingbed for rehab. States she wants to discharge home. Talked with her re home environment. States she lives alone but she does have neighbor who can help her if needed. Will have PT screening. She is interested in home health with PT and OT at discharge.   Low grade temp of 100 this morning. Swab for influenza and covid negative. Sat 94% on one liter.       Overview/Hospital Course:  03/07/2024-mild improvement in prerenal azotemia.  Still likely intravascular volume depletion and recommend continuing fluids.  Replete electrolytes as indicated.    Interval History:  No acute events overnight    Review of Systems   Constitutional:  Positive for appetite change. Negative for fatigue.   HENT:  Negative for sinus pressure, sinus pain, sore throat and trouble swallowing.    Eyes:  Negative for discharge and itching.   Respiratory:  Negative for cough and shortness of breath.    Cardiovascular:  Positive for palpitations. Negative for chest pain and leg swelling.   Gastrointestinal:  Negative for abdominal distention, abdominal pain, constipation, diarrhea, nausea and vomiting.        Reports last BM  3 days ago - states she uses miralax and glycerin at home- states sometimes she has to manually remove stool   Genitourinary:  Negative for dysuria.        Reports  having occasional urinary incontinence   Musculoskeletal:  Positive for arthralgias and back pain. Negative for neck pain and neck stiffness.        Thoracic back pain    Skin:  Negative for color change, pallor, rash and wound.   Neurological:  Positive for weakness. Negative for light-headedness and headaches.     Objective:     Vital Signs (Most Recent):  Temp: 97.5 °F (36.4 °C) (03/07/24 0717)  Pulse: 82 (03/07/24 0743)  Resp: 18 (03/07/24 1021)  BP: 136/67 (03/07/24 0717)  SpO2: (!) 92 % (03/07/24 0743) Vital Signs (24h Range):  Temp:  [97.5 °F (36.4 °C)-98.4 °F (36.9 °C)] 97.5 °F (36.4 °C)  Pulse:  [] 82  Resp:  [18-20] 18  SpO2:  [90 %-98 %] 92 %  BP: (116-163)/(56-84) 136/67     Weight: 90.7 kg (200 lb)  Body mass index is 33.28 kg/m².    Intake/Output Summary (Last 24 hours) at 3/7/2024 1122  Last data filed at 3/7/2024 0921  Gross per 24 hour   Intake 480 ml   Output 400 ml   Net 80 ml         Physical Exam  HENT:      Head: Normocephalic.      Mouth/Throat:      Mouth: Mucous membranes are moist.   Cardiovascular:      Rate and Rhythm: Normal rate and regular rhythm.      Pulses: Normal pulses.      Heart sounds: Normal heart sounds. No murmur heard.     No friction rub. No gallop.   Pulmonary:      Effort: Pulmonary effort is normal. No respiratory distress.      Breath sounds: Normal breath sounds. No stridor. No wheezing, rhonchi or rales.      Comments: Sat on 2 liters is 94%  Dropped to 89% on room air  Chest:      Chest wall: No tenderness.   Abdominal:      General: Bowel sounds are normal. There is no distension.      Palpations: Abdomen is soft. There is no mass.      Tenderness: There is no abdominal tenderness. There is no guarding or rebound.      Hernia: No hernia is present.   Musculoskeletal:      Cervical back: Normal range of motion.      Comments: Thoracic back pain - tlso brace on   Skin:     General: Skin is warm and dry.   Neurological:      Mental Status: She is alert and  oriented to person, place, and time.      Motor: Weakness present.   Psychiatric:         Mood and Affect: Mood normal.             Significant Labs: All pertinent labs within the past 24 hours have been reviewed.  Recent Lab Results  (Last 5 results in the past 24 hours)        03/07/24  0925   03/07/24  0829   03/07/24  0605   03/06/24  2332   03/06/24  2254        Influenza A, Molecular Negative               Influenza B, Molecular Negative               Albumin/Globulin Ratio         0.5       Albumin         2.7       ALP         87       ALT         28       Anion Gap     9     12       Appearance, UA       Clear         AST         48       Bacteria, UA       Few         Baso #     0.01     0.01       Basophil %     0.1     0.1       Bilirubin (UA)       Small         BILIRUBIN TOTAL         0.5       BUN     18     20       BUN/CREAT RATIO     24     26       Calcium     8.8     9.4       Chloride     96     95       CO2     30     30       Color, UA       Yellow         ID NOW COVID-19, (SARAN) Negative               Creatinine     0.74     0.78       Differential Method     Auto     Auto       eGFR     83     78       Eos #     0.03     0.01       Eos %     0.2     0.1       Globulin, Total         5.1       Glucose     110     127       Glucose, UA       Negative         Hematocrit     29.6     32.2       Hemoglobin     9.8     10.7       Hyaline Casts, UA       0-2         Ketones, UA       40         Leukocyte Esterase, UA       Negative         Lymph #     0.92     0.89       Lymph %     6.7     5.6       MCH     31.9     31.8       MCHC     33.1     33.2       MCV     96.4     95.8       Mono #     1.26     1.27       Mono %     9.1     8.0       MPV     9.4     9.3       Mucus, UA       Many         Neutrophils, Abs     11.58     13.63       Neutrophils Relative     83.9     86.2       NITRITE UA       Negative         Blood, UA       Small         pH, UA       6.0         Platelet Count     235      235       POC Glucose   108             Potassium     3.2     3.0       PROTEIN TOTAL         7.8       Protein, UA       100         RBC     3.07     3.36       RBC, UA       3-5         RDW     12.2     12.1       Sodium     132     134       Specific [place], UA       1.025         Squam [place]hel, UA       None Seen         UROBILINOGEN UA       0.2         WBC, UA       0-5         WBC     13.80     15.81       Yeast, UA       Occasional                                Significant Imaging: I have reviewed all pertinent imaging results/findings within the past 24 hours.    Assessment/Plan:     Dehydration  Continue IV ns       Constipation    03/07/24 reports no bm in past 3 days  Give dulcolax today   Stool softener daily    Dyslipidemia  03/07/24 continue statin      GERD (gastroesophageal reflux disease)  03/07/24 continue protonix         Hypokalemia  Patient has hypokalemia which is Acute and currently controlled. Most recent potassium levels reviewed-   Lab Results   Component Value Date    K 3.2 (L) 03/07/2024   . Will continue potassium replacement per protocol and recheck repeat levels after replacement completed.     Thoracic compression fracture    CT shows questionable mild age-indeterminate compression fracture of T4, correlate with point tenderness.   TLSO brace on - norco for pain relief          VTE Risk Mitigation (From admission, onward)           Ordered     enoxaparin injection 40 mg  Daily         03/07/24 0658     Place JUNITO hose  Until discontinued         03/07/24 0658     IP VTE HIGH RISK PATIENT  Once         03/07/24 0046     Place sequential compression device  Until discontinued         03/07/24 0046     Place JUNITO hose  Until discontinued         03/07/24 0046                    Discharge Planning   MARILEE:      Code Status: DNR   Is the patient medically ready for discharge?:     Reason for patient still in hospital (select all that apply): Patient trending condition, Laboratory test,  Treatment, and Pending disposition                     Galindo De Jesus IV, DO  Department of Hospital Medicine   [facility] - [facility]

## 2024-03-07 NOTE — PLAN OF CARE
Problem: Adult Inpatient Plan of Care  Goal: Patient-Specific Goal (Individualized)  Outcome: Ongoing, Progressing  Goal: Absence of Hospital-Acquired Illness or Injury  Outcome: Ongoing, Progressing  Goal: Optimal Comfort and Wellbeing  Outcome: Ongoing, Progressing     Problem: Pain (Orthopaedic Fracture)  Goal: Acceptable Pain Control  Outcome: Ongoing, Not Progressing  Intervention: Manage Acute Orthopaedic-Related Pain  Flowsheets (Taken 3/7/2024 8922)  Pain Management Interventions:   pain management plan reviewed with patient/caregiver   quiet environment facilitated   position adjusted   medication offered

## 2024-03-07 NOTE — PLAN OF CARE
Problem: Adult Inpatient Plan of Care  Goal: Plan of Care Review  Outcome: Ongoing, Progressing  Goal: Patient-Specific Goal (Individualized)  Outcome: Ongoing, Progressing  Goal: Absence of Hospital-Acquired Illness or Injury  Outcome: Ongoing, Progressing  Goal: Optimal Comfort and Wellbeing  Outcome: Ongoing, Progressing  Goal: Readiness for Transition of Care  Outcome: Ongoing, Progressing     Problem: Fall Injury Risk  Goal: Absence of Fall and Fall-Related Injury  Outcome: Ongoing, Progressing     Problem: Skin Injury Risk Increased  Goal: Skin Health and Integrity  Outcome: Ongoing, Progressing     Problem: Bleeding (Orthopaedic Fracture)  Goal: Absence of Bleeding  Outcome: Ongoing, Progressing     Problem: Embolism (Orthopaedic Fracture)  Goal: Absence of Embolism Signs and Symptoms  Outcome: Ongoing, Progressing     Problem: Fracture Stabilization and Management (Orthopaedic Fracture)  Goal: Fracture Stability  Outcome: Ongoing, Progressing     Problem: Functional Ability Impaired (Orthopaedic Fracture)  Goal: Optimal Functional Ability  Outcome: Ongoing, Progressing     Problem: Infection (Orthopaedic Fracture)  Goal: Absence of Infection Signs and Symptoms  Outcome: Ongoing, Progressing     Problem: Neurovascular Compromise (Orthopaedic Fracture)  Goal: Effective Tissue Perfusion  Outcome: Ongoing, Progressing     Problem: Pain (Orthopaedic Fracture)  Goal: Acceptable Pain Control  Outcome: Ongoing, Progressing     Problem: Respiratory Compromise (Orthopaedic Fracture)  Goal: Effective Oxygenation and Ventilation  Outcome: Ongoing, Progressing

## 2024-03-07 NOTE — H&P
[facility] - [facility] Surgical Unit  Hospital Medicine  History & Physical    Patient Name: Roselia Eldridge  MRN: [id number]  Patient Class: OP- Observation  Admission Date: 3/6/2024  Attending Physician: Galindo De Jesus IV, DO   Primary Care Provider: Pamela Jurado DO         Patient information was obtained from patient and ER records.     Subjective:     Principal Problem:Dehydration    Chief Complaint:   Chief Complaint   Patient presents with    Back Pain     Pt arrives per EMS with c/o difficulty walking and back pain s/p T4 fx 2 days ago        HPI: Patient presents for evaluation of back pain. She has a known T4 compression fracture but also reports right rib pain after an incident tonight where she felt her knees lock up and eased herself down striking her ribs on a box on the floor. She denies any LOC or head injury during this event or the prior event. She was alert and oriented x4. GCS 15. She reports that she has been barely mobile since her diagnosis of the compression fracture in her back. Also reports decreased intake and inability to care for herself at home. She did receive 100 mcg of fentanyl prior to arrival for pain control and reports mild improvement. She was not received in a TLSO brace. We will obtain lab specimens for further evaluation and perform a chest x-ray to rule out any underlying bony abnormality related to her recent rib injury. We will provide her rehydration with normal saline 1 L bolus IV. She did also have some wheezing noted on exam and reports a prior history of asthma. We will provide her with DuoNeb nebulizer treatment. We will also provide her with a TLSO brace for comfort and pain control. Labs were reviewed and showed a potassium at 3.0. We will provide her with 40 mEq of potassium p.o.. She also reports continued pain despite the fentanyl prior to arrival so we will attempt hydrocodone 5 mg p.o. as she was previously prescribed but unable to obtain.  Workup does reveal an elevated white blood cell count but chest x-ray and urine sample showed no signs of infection. She does appear to be dehydrated due to her decreased oral intake since the initial injury. We did discuss the case with Dr. Myers who is agreed to admit the patient at [facility] for impaired mobility in ADLs, intractable pain, hypokalemia, and dehydration.       Above is ER record from 03/06/24.     Mrs. Eldridge is resting in bed this morning. She is awake, alert and oriented. Complains of reflux this morning. States she needs her protonix- already ordered. Complains of thoracic back pain. Has TLSO brace on. States she wants her muscle relaxer and norco. States norco 5 is not doing anything for her pain. Dc'd norco 5 and baclofen. Increased norco to every 6 hours prn pain. Potassium 3.2- will replace orally.   She states she is not interested in swingbed for rehab. States she wants to discharge home. Talked with her re home environment. States she lives alone but she does have neighbor who can help her if needed. Will have PT screening. She is interested in home health with PT and OT at discharge.   Low grade temp of 100 this morning. Swab for influenza and covid negative. Sat 94% on one liter.       Past Medical History:   Diagnosis Date    Asthma     Hypertension     Mixed hyperlipidemia        Past Surgical History:   Procedure Laterality Date    COSMETIC SURGERY         Review of patient's allergies indicates:   Allergen Reactions    Pentazocine Nausea Only    Talwin compound Nausea And Vomiting       No current facility-administered medications on file prior to encounter.     Current Outpatient Medications on File Prior to Encounter   Medication Sig    albuterol (PROVENTIL/VENTOLIN HFA) 90 mcg/actuation inhaler Inhale 2 puffs into the lungs every 6 (six) hours as needed.    aspirin 325 MG tablet Take 325 mg by mouth every other day.    baclofen (LIORESAL) 10 MG tablet Take 10 mg by mouth  3 (three) times daily.    chlorthalidone (HYGROTEN) 25 MG Tab Take 25 mg by mouth.    fluticasone-salmeterol diskus inhaler 250-50 mcg Inhale 1 puff into the lungs.    HYDROcodone-acetaminophen (NORCO) 5-325 mg per tablet Take 1 tablet by mouth every 4 (four) hours as needed for Pain.    metoprolol succinate (TOPROL-XL) 50 MG 24 hr tablet Take 1 tablet by mouth 2 (two) times daily.    omeprazole (PRILOSEC) 20 MG capsule Take 20 mg by mouth once daily.    oxybutynin (DITROPAN-XL) 10 MG 24 hr tablet Take 1 tablet by mouth 2 (two) times daily.    sertraline (ZOLOFT) 100 MG tablet Take 100 mg by mouth.    simvastatin (ZOCOR) 40 MG tablet Take 1 tablet by mouth every evening.    traMADoL (ULTRAM) 50 mg tablet Take 50 mg by mouth every 6 (six) hours as needed.    triamcinolone acetonide 0.1% (KENALOG) 0.1 % cream Apply topically.    vitamin D (VITAMIN D3) 1000 units Tab Take 5,000 Units by mouth.     Family History    None       Tobacco Use    Smoking status: Never    Smokeless tobacco: Never   Substance and Sexual Activity    Alcohol use: Yes     Comment: daily    Drug use: Never    Sexual activity: Not on file     Review of Systems   Constitutional:  Positive for appetite change. Negative for fatigue.   HENT:  Negative for sinus pressure, sinus pain, sore throat and trouble swallowing.    Eyes:  Negative for discharge and itching.   Respiratory:  Negative for cough and shortness of breath.    Cardiovascular:  Positive for palpitations. Negative for chest pain and leg swelling.   Gastrointestinal:  Negative for abdominal distention, abdominal pain, constipation, diarrhea, nausea and vomiting.        Reports last BM  3 days ago - states she uses miralax and glycerin at home- states sometimes she has to manually remove stool   Genitourinary:  Negative for dysuria.        Reports having occasional urinary incontinence   Musculoskeletal:  Positive for arthralgias and back pain. Negative for neck pain and neck stiffness.         Thoracic back pain    Skin:  Negative for color change, pallor, rash and wound.   Neurological:  Positive for weakness. Negative for light-headedness and headaches.     Objective:     Vital Signs (Most Recent):  Temp: 97.5 °F (36.4 °C) (03/07/24 0717)  Pulse: 82 (03/07/24 0743)  Resp: 18 (03/07/24 0743)  BP: 136/67 (03/07/24 0717)  SpO2: (!) 92 % (03/07/24 0743) Vital Signs (24h Range):  Temp:  [97.5 °F (36.4 °C)-98.4 °F (36.9 °C)] 97.5 °F (36.4 °C)  Pulse:  [] 82  Resp:  [18-20] 18  SpO2:  [90 %-98 %] 92 %  BP: (116-163)/(56-84) 136/67     Weight: 90.7 kg (200 lb)  Body mass index is 33.28 kg/m².     Physical Exam  HENT:      Head: Normocephalic.      Mouth/Throat:      Mouth: Mucous membranes are moist.   Cardiovascular:      Rate and Rhythm: Normal rate and regular rhythm.      Pulses: Normal pulses.      Heart sounds: Normal heart sounds. No murmur heard.     No friction rub. No gallop.   Pulmonary:      Effort: Pulmonary effort is normal. No respiratory distress.      Breath sounds: Normal breath sounds. No stridor. No wheezing, rhonchi or rales.      Comments: Sat on 2 liters is 94%  Dropped to 89% on room air  Chest:      Chest wall: No tenderness.   Abdominal:      General: Bowel sounds are normal. There is no distension.      Palpations: Abdomen is soft. There is no mass.      Tenderness: There is no abdominal tenderness. There is no guarding or rebound.      Hernia: No hernia is present.   Musculoskeletal:      Cervical back: Normal range of motion.      Comments: Thoracic back pain - tlso brace on   Skin:     General: Skin is warm and dry.   Neurological:      Mental Status: She is alert and oriented to person, place, and time.      Motor: Weakness present.   Psychiatric:         Mood and Affect: Mood normal.                Significant Labs: All pertinent labs within the past 24 hours have been reviewed.  Recent Lab Results  (Last 5 results in the past 24 hours)        03/07/24  8894    03/07/24  0829   03/07/24  0605   03/06/24  2332   03/06/24  2254        Influenza A, Molecular Negative               Influenza B, Molecular Negative               Albumin/Globulin Ratio         0.5       Albumin         2.7       ALP         87       ALT         28       Anion Gap     9     12       Appearance, UA       Clear         AST         48       Bacteria, UA       Few         Baso #     0.01     0.01       Basophil %     0.1     0.1       Bilirubin (UA)       Small         BILIRUBIN TOTAL         0.5       BUN     18     20       BUN/CREAT RATIO     24     26       Calcium     8.8     9.4       Chloride     96     95       CO2     30     30       Color, UA       Yellow         ID NOW COVID-19, (SARAN) Negative               Creatinine     0.74     0.78       Differential Method     Auto     Auto       eGFR     83     78       Eos #     0.03     0.01       Eos %     0.2     0.1       Globulin, Total         5.1       Glucose     110     127       Glucose, UA       Negative         Hematocrit     29.6     32.2       Hemoglobin     9.8     10.7       Hyaline Casts, UA       0-2         Ketones, UA       40         Leukocyte Esterase, UA       Negative         Lymph #     0.92     0.89       Lymph %     6.7     5.6       MCH     31.9     31.8       MCHC     33.1     33.2       MCV     96.4     95.8       Mono #     1.26     1.27       Mono %     9.1     8.0       MPV     9.4     9.3       Mucus, UA       Many         Neutrophils, Abs     11.58     13.63       Neutrophils Relative     83.9     86.2       NITRITE UA       Negative         Blood, UA       Small         pH, UA       6.0         Platelet Count     235     235       POC Glucose   108             Potassium     3.2     3.0       PROTEIN TOTAL         7.8       Protein, UA       100         RBC     3.07     3.36       RBC, UA       3-5         RDW     12.2     12.1       Sodium     132     134       Specific [place], UA       1.025         Squam  [place], UA       None Seen         UROBILINOGEN UA       0.2         WBC, UA       0-5         WBC     13.80     15.81       Yeast, UA       Occasional                                Significant Imaging: I have reviewed all pertinent imaging results/findings within the past 24 hours.  Assessment/Plan:     * Dehydration  Continue IV ns       Constipation    reports no bm in past 3 days  Give dulcolax today   Stool softener daily    Dyslipidemia  continue statin      GERD (gastroesophageal reflux disease)  continue protonix         Hypokalemia  Patient has hypokalemia which is Acute and currently controlled. Most recent potassium levels reviewed-   Lab Results   Component Value Date    K 3.2 (L) 03/07/2024   . Will continue potassium replacement per protocol and recheck repeat levels after replacement completed.     Thoracic compression fracture    CT shows questionable mild age-indeterminate compression fracture of T4, correlate with point tenderness.   TLSO brace on - norco for pain relief          VTE Risk Mitigation (From admission, onward)           Ordered     enoxaparin injection 40 mg  Daily         03/07/24 0658     Place JUNITO hose  Until discontinued         03/07/24 0658     IP VTE HIGH RISK PATIENT  Once         03/07/24 0046     Place sequential compression device  Until discontinued         03/07/24 0046     Place JUNITO hose  Until discontinued         03/07/24 0046                         On 03/06/2024, patient should be placed in hospital observation services.         Galindo De Jesus IV, DO  Department of Hospital Medicine  [facility]

## 2024-03-07 NOTE — ASSESSMENT & PLAN NOTE
CT shows questionable mild age-indeterminate compression fracture of T4, correlate with point tenderness.   TLSO brace on - norco for pain relief

## 2024-03-07 NOTE — PLAN OF CARE
Problem: Physical Therapy  Goal: Physical Therapy Goal  Description: Short-term Goals: 1.5 weeks  1. Patient will perform supine to/from sit with minimal assistance x 2 people to improve independence and safety with bed mobility.  2. Patient will perform sit to/from stand with a rolling walker with minimal assistance to improve independence and safety with transfers.  3. Patient will ambulate 50 feet with a rolling walker with minimal assistance to improve independence and safety with gait.  4. Patient will tolerate 15 minutes of physical therapy intervention to improve endurance and activity tolerance.    Long-term goals: 3 weeks  1. Patient will perform supine to/from sit with minimal assistance to improve independence and safety with bed mobility.  2. Patient will perform sit to/from stand with a rolling walker with contact guard assist to improve independence and safety with transfers.  3. Patient will ambulate 100 feet with a rolling walker with contact guard assist to improve independence and safety with gait.  4. Patient will tolerate 30 minutes of physical therapy intervention to improve endurance and activity tolerance.    Outcome: Ongoing, Progressing

## 2024-03-07 NOTE — NURSING
Pt requested to call fire and rescue of [facility] to be called to retrieve number of neighbor Maru Johnson. Number for [employer] is [phone]. Was notified they werent able to give numbers out, however would be able to let Mrs. Johnson know to call [facility]. Left name and number.    Follow up call to [employer] attempted to see if Mrs. Johnson was able to be reached and met with no answer.

## 2024-03-07 NOTE — PLAN OF CARE
Problem: Adult Inpatient Plan of Care  Goal: Plan of Care Review  Outcome: Ongoing, Progressing  Goal: Patient-Specific Goal (Individualized)  Outcome: Ongoing, Progressing  Goal: Absence of Hospital-Acquired Illness or Injury  Outcome: Ongoing, Progressing  Goal: Optimal Comfort and Wellbeing  Outcome: Ongoing, Progressing  Goal: Readiness for Transition of Care  Outcome: Ongoing, Progressing     Problem: Fall Injury Risk  Goal: Absence of Fall and Fall-Related Injury  Outcome: Ongoing, Progressing     Problem: Skin Injury Risk Increased  Goal: Skin Health and Integrity  Outcome: Ongoing, Progressing     Problem: Bleeding (Orthopaedic Fracture)  Goal: Absence of Bleeding  Outcome: Ongoing, Progressing     Problem: Embolism (Orthopaedic Fracture)  Goal: Absence of Embolism Signs and Symptoms  Outcome: Ongoing, Progressing     Problem: Fracture Stabilization and Management (Orthopaedic Fracture)  Goal: Fracture Stability  Outcome: Ongoing, Progressing     Problem: Functional Ability Impaired (Orthopaedic Fracture)  Goal: Optimal Functional Ability  Outcome: Ongoing, Progressing     Problem: Infection (Orthopaedic Fracture)  Goal: Absence of Infection Signs and Symptoms  Outcome: Ongoing, Progressing     Problem: Neurovascular Compromise (Orthopaedic Fracture)  Goal: Effective Tissue Perfusion  Outcome: Ongoing, Progressing     Problem: Pain (Orthopaedic Fracture)  Goal: Acceptable Pain Control  Outcome: Ongoing, Progressing     Problem: Respiratory Compromise (Orthopaedic Fracture)  Goal: Effective Oxygenation and Ventilation  Outcome: Ongoing, Progressing     Problem: Pain Acute  Goal: Acceptable Pain Control and Functional Ability  Outcome: Ongoing, Progressing     Problem: Infection  Goal: Absence of Infection Signs and Symptoms  Outcome: Ongoing, Progressing

## 2024-03-07 NOTE — ASSESSMENT & PLAN NOTE
Patient has hypokalemia which is Acute and currently controlled. Most recent potassium levels reviewed-   Lab Results   Component Value Date    K 3.2 (L) 03/07/2024   . Will continue potassium replacement per protocol and recheck repeat levels after replacement completed.

## 2024-03-07 NOTE — HOSPITAL COURSE
03/07/2024-mild improvement in prerenal azotemia.  Still likely intravascular volume depletion and recommend continuing fluids.  Replete electrolytes as indicated.    1300- went to check on Mrs. Eldridge. She has no complaints. She is looking at ceiling stating that her mastercard statement is up there. Oriented to person, place and time. Consulted with Dr. Neal novak visual hallucinations. Will dc norco and add lidoderm patch and naprosyn.      03/08/24 Resting in bed with HOB flat. Complains of reflux. Encouraged her to allow HOB to be raised.  States back pain is better with lidoderm patch . Reports level 6 when she moves. She is oriented x 3 but continues to complain of having visual hallucinations. States she is seeing little black girls dancing on the ceiling. WBC 10.92 . She is afebrile. Last dose of norco was around 1100 on 03/07/24. Has TLSO brace on due to compression fracture seen on CT 03/04. She is very worried about her neighbor feeding her dogs. Nurses have contacted neighbor per her request re this. Talked with Mrs. Eldridge re needing swing bed due to the fact that she lives alone and she is also having delerium. Will place PT and OT evaluations. She is agreeable to swing bed. Probable dc from acute care to swing bed tomorrow.      Talked with her re ibuprofen since it is flagging as an allergy - states she can and does take this without any issues.    03/9/24 Patient awake in bed, eating breakfast, reports she feels better today. Patient reports she feels like her appetite is improving, states she has not seen any more cars or animals in the trees since yesterday. Patient states her pain has been well controlled and she is looking forward to working PT so she can get back home. Patient will be moved to swing bed status after her 3 midnight stays which will be tomorrow. Dr. Guerra was available for rounds this morning.     3/10/24 Patient has had 3 midnight stays.  She will now be admitted into our swing  "bed to work with PT/OT therapies.  I discussed this with the patient and she has agreed to work with therapy.       03/11/24 Awake and alert, resting in bed. Complains that nurses sre wanting to dry her and she is not ready to do so yet. Encouraged her to let nurses clean her up , get urine off of her skin so she will not have skin  break down. States " I am not a child , it willnot be on my skin that long." States she has not had any further hallucinations since Saturday. States ibuprofen is working well for back pain. Agreeable to swing bed for continued therapy. Discharged to swing bed from acute care.  "

## 2024-03-07 NOTE — PLAN OF CARE
Problem: Occupational Therapy  Goal: Occupational Therapy Goal  Description: Grooming Status:   Short Term Goal: Pt will perform grooming with Min A sitting EOB.   Long Term Goal: Pt will perform grooming/oral hygiene standing at sink with SBA      LE dressing Status:   Short Term Goal: Pt will perform LE dressing with Mod A using AD.   Long Term Goal: Pt will perform LE dressing with Min A using AD.    Toileting Status:   Short Term Goal: Pt will perform toilet hygiene on BSC with Mod A.  Long Term Goal: Pt will perform toilet hygiene on toilet with no AE with Min A.    Commode Transfer:   Short Term Goal: Pt will perform BSC t/f with Mod A.  Long Term Goal:  Pt will perform toilet t/f in bathroom with Min A.     Bathing Status:   Long Term Goal: Pt will perform sponge bath with Mod A with AD prn and no unsafe fatigue.     Strength Status: 4/5 BUEs  Long Term Goal: Pt to perform BUE strengthening with weights and/or body weight to increase ADL independence and safety    Endurance Status:   Short Term Goal:pt to perform 15 min OT treatment with 5 or greater rest breaks  Long Term Goal: pt to perform 30 min OT treat with 3 or less rest breaks     Outcome: Ongoing, Progressing

## 2024-03-08 PROBLEM — R41.0 DELIRIUM: Status: ACTIVE | Noted: 2024-03-08

## 2024-03-08 PROBLEM — R29.6 FREQUENT FALLS: Status: ACTIVE | Noted: 2024-03-08

## 2024-03-08 PROBLEM — R41.0 DELIRIUM: Status: RESOLVED | Noted: 2024-03-08 | Resolved: 2024-03-08

## 2024-03-08 PROBLEM — R44.1 VISUAL HALLUCINATIONS: Status: ACTIVE | Noted: 2024-03-08

## 2024-03-08 PROBLEM — R63.0 POOR APPETITE: Status: ACTIVE | Noted: 2024-03-08

## 2024-03-08 LAB
ANION GAP SERPL CALCULATED.3IONS-SCNC: 10 MMOL/L (ref 7–16)
BASOPHILS # BLD AUTO: 0.02 K/UL (ref 0–0.2)
BASOPHILS NFR BLD AUTO: 0.2 % (ref 0–1)
BUN SERPL-MCNC: 11 MG/DL (ref 7–18)
BUN/CREAT SERPL: 15 (ref 6–20)
CALCIUM SERPL-MCNC: 8.8 MG/DL (ref 8.5–10.1)
CHLORIDE SERPL-SCNC: 97 MMOL/L (ref 98–107)
CO2 SERPL-SCNC: 29 MMOL/L (ref 21–32)
CREAT SERPL-MCNC: 0.72 MG/DL (ref 0.55–1.02)
DIFFERENTIAL METHOD BLD: ABNORMAL
EGFR (NO RACE VARIABLE) (RUSH/TITUS): 86 ML/MIN/1.73M2
EOSINOPHIL # BLD AUTO: 0.09 K/UL (ref 0–0.5)
EOSINOPHIL NFR BLD AUTO: 0.8 % (ref 1–4)
ERYTHROCYTE [DISTWIDTH] IN BLOOD BY AUTOMATED COUNT: 12.5 % (ref 11.5–14.5)
GLUCOSE SERPL-MCNC: 96 MG/DL (ref 74–106)
HCT VFR BLD AUTO: 30.1 % (ref 38–47)
HGB BLD-MCNC: 9.9 G/DL (ref 12–16)
LYMPHOCYTES # BLD AUTO: 0.99 K/UL (ref 1–4.8)
LYMPHOCYTES NFR BLD AUTO: 9.1 % (ref 27–41)
MAGNESIUM SERPL-MCNC: 2 MG/DL (ref 1.7–2.3)
MCH RBC QN AUTO: 32 PG (ref 27–31)
MCHC RBC AUTO-ENTMCNC: 32.9 G/DL (ref 32–36)
MCV RBC AUTO: 97.4 FL (ref 80–96)
MONOCYTES # BLD AUTO: 0.9 K/UL (ref 0–0.8)
MONOCYTES NFR BLD AUTO: 8.2 % (ref 2–6)
MPC BLD CALC-MCNC: 9.1 FL (ref 9.4–12.4)
NEUTROPHILS # BLD AUTO: 8.92 K/UL (ref 1.8–7.7)
NEUTROPHILS NFR BLD AUTO: 81.7 % (ref 53–65)
PLATELET # BLD AUTO: 241 K/UL (ref 150–400)
POTASSIUM SERPL-SCNC: 3.7 MMOL/L (ref 3.5–5.1)
RBC # BLD AUTO: 3.09 M/UL (ref 4.2–5.4)
SODIUM SERPL-SCNC: 132 MMOL/L (ref 136–145)
WBC # BLD AUTO: 10.92 K/UL (ref 4.5–11)

## 2024-03-08 PROCEDURE — 99233 SBSQ HOSP IP/OBS HIGH 50: CPT | Mod: ,,, | Performed by: NURSE PRACTITIONER

## 2024-03-08 PROCEDURE — 83735 ASSAY OF MAGNESIUM: CPT | Performed by: NURSE PRACTITIONER

## 2024-03-08 PROCEDURE — 25000242 PHARM REV CODE 250 ALT 637 W/ HCPCS

## 2024-03-08 PROCEDURE — 27000221 HC OXYGEN, UP TO 24 HOURS

## 2024-03-08 PROCEDURE — 25000003 PHARM REV CODE 250

## 2024-03-08 PROCEDURE — 11000001 HC ACUTE MED/SURG PRIVATE ROOM

## 2024-03-08 PROCEDURE — 94761 N-INVAS EAR/PLS OXIMETRY MLT: CPT

## 2024-03-08 PROCEDURE — 63600175 PHARM REV CODE 636 W HCPCS: Performed by: NURSE PRACTITIONER

## 2024-03-08 PROCEDURE — 25000003 PHARM REV CODE 250: Performed by: NURSE PRACTITIONER

## 2024-03-08 PROCEDURE — 80048 BASIC METABOLIC PNL TOTAL CA: CPT

## 2024-03-08 PROCEDURE — G0378 HOSPITAL OBSERVATION PER HR: HCPCS

## 2024-03-08 PROCEDURE — 94640 AIRWAY INHALATION TREATMENT: CPT | Mod: XB

## 2024-03-08 PROCEDURE — 99900035 HC TECH TIME PER 15 MIN (STAT)

## 2024-03-08 PROCEDURE — 85025 COMPLETE CBC W/AUTO DIFF WBC: CPT

## 2024-03-08 RX ORDER — IBUPROFEN 200 MG
600 TABLET ORAL EVERY 6 HOURS PRN
Status: DISCONTINUED | OUTPATIENT
Start: 2024-03-08 | End: 2024-03-11 | Stop reason: HOSPADM

## 2024-03-08 RX ORDER — BISACODYL 5 MG
10 TABLET, DELAYED RELEASE (ENTERIC COATED) ORAL DAILY PRN
Status: DISCONTINUED | OUTPATIENT
Start: 2024-03-08 | End: 2024-03-11 | Stop reason: HOSPADM

## 2024-03-08 RX ADMIN — LIDOCAINE 1 PATCH: 50 PATCH CUTANEOUS at 01:03

## 2024-03-08 RX ADMIN — SERTRALINE HYDROCHLORIDE 100 MG: 50 TABLET ORAL at 08:03

## 2024-03-08 RX ADMIN — MELATONIN TAB 3 MG 6 MG: 3 TAB at 08:03

## 2024-03-08 RX ADMIN — METOPROLOL SUCCINATE 50 MG: 50 TABLET, FILM COATED, EXTENDED RELEASE ORAL at 08:03

## 2024-03-08 RX ADMIN — POLYETHYLENE GLYCOL 3350 17 G: 17 POWDER, FOR SOLUTION ORAL at 08:03

## 2024-03-08 RX ADMIN — IPRATROPIUM BROMIDE AND ALBUTEROL SULFATE 3 ML: 2.5; .5 SOLUTION RESPIRATORY (INHALATION) at 07:03

## 2024-03-08 RX ADMIN — IPRATROPIUM BROMIDE AND ALBUTEROL SULFATE 3 ML: 2.5; .5 SOLUTION RESPIRATORY (INHALATION) at 01:03

## 2024-03-08 RX ADMIN — ENOXAPARIN SODIUM 40 MG: 40 INJECTION SUBCUTANEOUS at 05:03

## 2024-03-08 RX ADMIN — ATORVASTATIN CALCIUM 20 MG: 10 TABLET, FILM COATED ORAL at 08:03

## 2024-03-08 RX ADMIN — PANTOPRAZOLE SODIUM 40 MG: 40 TABLET, DELAYED RELEASE ORAL at 08:03

## 2024-03-08 RX ADMIN — SODIUM CHLORIDE: 9 INJECTION, SOLUTION INTRAVENOUS at 01:03

## 2024-03-08 RX ADMIN — CALCIUM CARBONATE (ANTACID) CHEW TAB 500 MG 500 MG: 500 CHEW TAB at 04:03

## 2024-03-08 NOTE — ASSESSMENT & PLAN NOTE
CT shows questionable mild age-indeterminate compression fracture of T4, correlate with point tenderness.   TLSO brace on - norco for pain relief        Dcd norco - due to hallucinations after taking this     03/08/24 Lidoderm patch and tylenol for pain relief  PT and OT to evaluate and treat

## 2024-03-08 NOTE — SUBJECTIVE & OBJECTIVE
Interval History:back pain, delerium    Review of Systems   Constitutional:  Positive for appetite change. Negative for fatigue.   HENT:  Negative for sinus pressure, sinus pain, sore throat and trouble swallowing.    Eyes:  Negative for discharge and itching.   Respiratory:  Negative for cough and shortness of breath.    Cardiovascular:  Negative for chest pain, palpitations and leg swelling.   Gastrointestinal:  Negative for abdominal distention, abdominal pain, constipation, diarrhea, nausea and vomiting.   Genitourinary:  Negative for dysuria.        Reports having occasional urinary incontinence   Musculoskeletal:  Positive for arthralgias and back pain. Negative for neck pain and neck stiffness.        Thoracic back pain    Skin:  Negative for color change, pallor, rash and wound.   Neurological:  Positive for weakness. Negative for light-headedness and headaches.     Objective:     Vital Signs (Most Recent):  Temp: 98.9 °F (37.2 °C) (03/08/24 1227)  Pulse: 87 (03/08/24 1327)  Resp: 18 (03/08/24 1327)  BP: (!) 143/81 (03/08/24 1227)  SpO2: 95 % (03/08/24 1327) Vital Signs (24h Range):  Temp:  [98 °F (36.7 °C)-98.9 °F (37.2 °C)] 98.9 °F (37.2 °C)  Pulse:  [73-87] 87  Resp:  [18-20] 18  SpO2:  [92 %-98 %] 95 %  BP: (119-166)/(69-81) 143/81     Weight: 90.7 kg (200 lb)  Body mass index is 33.28 kg/m².    Intake/Output Summary (Last 24 hours) at 3/8/2024 1337  Last data filed at 3/8/2024 1317  Gross per 24 hour   Intake 630 ml   Output 3450 ml   Net -2820 ml         Physical Exam  HENT:      Head: Normocephalic.      Mouth/Throat:      Mouth: Mucous membranes are moist.   Cardiovascular:      Rate and Rhythm: Normal rate and regular rhythm.      Pulses: Normal pulses.      Heart sounds: Normal heart sounds. No murmur heard.     No friction rub. No gallop.   Pulmonary:      Effort: Pulmonary effort is normal. No respiratory distress.      Breath sounds: Normal breath sounds. No stridor. No wheezing, rhonchi or  rales.   Chest:      Chest wall: No tenderness.   Abdominal:      General: Bowel sounds are normal. There is no distension.      Palpations: Abdomen is soft. There is no mass.      Tenderness: There is no abdominal tenderness. There is no guarding or rebound.      Hernia: No hernia is present.   Musculoskeletal:      Cervical back: Normal range of motion.      Comments: Thoracic back pain - tlso brace on   Skin:     General: Skin is warm and dry.   Neurological:      Mental Status: She is alert and oriented to person, place, and time.      Motor: Weakness present.   Psychiatric:         Mood and Affect: Mood normal.             Significant Labs: All pertinent labs within the past 24 hours have been reviewed.  Recent Lab Results         03/08/24  0504   03/08/24  0500   03/07/24  1523        Anion Gap   10         Baso #   0.02         Basophil %   0.2         BUN   11         BUN/CREAT RATIO   15         Calcium   8.8         Chloride   97         CO2   29         Creatinine   0.72         Differential Method   Auto         eGFR   86         Eos #   0.09         Eos %   0.8         Glucose   96         Hematocrit   30.1         Hemoglobin   9.9         Lymph #   0.99         Lymph %   9.1         Magnesium  2.0           MCH   32.0         MCHC   32.9         MCV   97.4         Mono #   0.90         Mono %   8.2         MPV   9.1         Neutrophils, Abs   8.92         Neutrophils Relative   81.7         Platelet Count   241         Potassium   3.7   4.0       RBC   3.09         RDW   12.5         Sodium   132         WBC   10.92                 Significant Imaging: I have reviewed all pertinent imaging results/findings within the past 24 hours.

## 2024-03-08 NOTE — SUBJECTIVE & OBJECTIVE
Interval History:back pain, delerium    Review of Systems   Constitutional:  Negative for fatigue.   HENT:  Negative for sinus pressure, sinus pain, sore throat and trouble swallowing.    Eyes:  Negative for discharge and itching.   Respiratory:  Negative for cough and shortness of breath.    Cardiovascular:  Negative for chest pain, palpitations and leg swelling.   Gastrointestinal:  Negative for abdominal distention, abdominal pain, constipation, diarrhea, nausea and vomiting.   Genitourinary:  Negative for dysuria.        Reports having occasional urinary incontinence   Musculoskeletal:  Positive for arthralgias and back pain. Negative for neck pain and neck stiffness.        Thoracic back pain    Skin:  Negative for color change, pallor, rash and wound.   Neurological:  Positive for weakness. Negative for light-headedness and headaches.     Objective:     Vital Signs (Most Recent):  Temp: 98.7 °F (37.1 °C) (03/11/24 0716)  Pulse: 82 (03/11/24 0737)  Resp: 18 (03/11/24 0737)  BP: 128/74 (03/11/24 0716)  SpO2: 96 % (03/11/24 0737) Vital Signs (24h Range):  Temp:  [97.8 °F (36.6 °C)-98.7 °F (37.1 °C)] 98.7 °F (37.1 °C)  Pulse:  [62-82] 82  Resp:  [18-20] 18  SpO2:  [94 %-98 %] 96 %  BP: ()/(54-86) 128/74     Weight: 87.1 kg (192 lb 1.6 oz) (per bed scale)  Body mass index is 31.97 kg/m².    Intake/Output Summary (Last 24 hours) at 3/11/2024 1012  Last data filed at 3/11/2024 0905  Gross per 24 hour   Intake 980 ml   Output --   Net 980 ml           Physical Exam  HENT:      Head: Normocephalic.      Mouth/Throat:      Mouth: Mucous membranes are moist.   Cardiovascular:      Rate and Rhythm: Normal rate and regular rhythm.      Pulses: Normal pulses.      Heart sounds: Normal heart sounds. No murmur heard.     No friction rub. No gallop.   Pulmonary:      Effort: Pulmonary effort is normal. No respiratory distress.      Breath sounds: Normal breath sounds. No stridor. No wheezing, rhonchi or rales.   Chest:       Chest wall: No tenderness.   Abdominal:      General: Bowel sounds are normal. There is no distension.      Palpations: Abdomen is soft. There is no mass.      Tenderness: There is no abdominal tenderness. There is no guarding or rebound.      Hernia: No hernia is present.   Musculoskeletal:      Cervical back: Normal range of motion.      Comments: Thoracic back pain - tlso brace on   Skin:     General: Skin is warm and dry.   Neurological:      Mental Status: She is alert and oriented to person, place, and time.      Motor: Weakness present.   Psychiatric:         Mood and Affect: Mood normal.             Significant Labs: All pertinent labs within the past 24 hours have been reviewed.  Recent Lab Results         03/11/24  0605        Anion Gap 11       Bands 3       Baso # 0.02       Basophil % 0.3       BUN 14       BUN/CREAT RATIO 21       Calcium 9.2       Chloride 98       CO2 31       Creatinine 0.66       Differential Method Manual       eGFR 90       Eos # 0.26       Eos % 3.8        4       Glucose 99       Hematocrit 28.2       Hemoglobin 9.2       Hypo Few       Lymph # 1.00       Lymph % 14.4        8       MCH 31.5       MCHC 32.6       MCV 96.6       Mono # 0.69       Mono % 10.0        10       MPV 9.2       Neutrophils, Abs 4.96       Neutrophils Relative 71.5       nRBC       PLATELET MORPHOLOGY Normal       Platelet Count 241       Potassium 3.7       RBC 2.92       RDW 12.6       Segmented Neutrophils, Man % 75       Sodium 136       WBC 6.93               Significant Imaging: I have reviewed all pertinent imaging results/findings within the past 24 hours.

## 2024-03-08 NOTE — ASSESSMENT & PLAN NOTE
03/08/24   Compression fracture t4 - age indeterminate   TLSO brace  PT and OT evaluation done  Plan is to dc to swing bed tomorrow- continue therapy for strengthening

## 2024-03-08 NOTE — PLAN OF CARE
Problem: Fall Injury Risk  Goal: Absence of Fall and Fall-Related Injury  Outcome: Ongoing, Progressing  Intervention: Identify and Manage Contributors  Flowsheets (Taken 3/8/2024 1708)  Self-Care Promotion:   independence encouraged   BADL personal objects within reach   BADL personal routines maintained   safe use of adaptive equipment encouraged   meal set-up provided  Intervention: Promote Injury-Free Environment  Flowsheets (Taken 3/8/2024 1708)  Safety Promotion/Fall Prevention:   assistive device/personal item within reach   bed alarm set   diversional activities provided   Fall Risk reviewed with patient/family   Fall Risk signage in place   lighting adjusted   nonskid shoes/socks when out of bed   side rails raised x 3   instructed to call staff for mobility   room near unit station     Problem: Skin Injury Risk Increased  Goal: Skin Health and Integrity  Outcome: Ongoing, Progressing  Intervention: Optimize Skin Protection  Flowsheets (Taken 3/8/2024 1708)  Pressure Reduction Techniques:   frequent weight shift encouraged   weight shift assistance provided   heels elevated off bed  Pressure Reduction Devices:   heel offloading device utilized   positioning supports utilized   pressure-redistributing mattress utilized  Skin Protection:   adhesive use limited   incontinence pads utilized   silicone foam dressing in place  Head of Bed (HOB) Positioning: HOB lowered

## 2024-03-08 NOTE — ASSESSMENT & PLAN NOTE
03/08/24 has taken ultram at [facility] for pain relief- states did not help  Tried norco yesterday  Complained of visual hallucinations after norco   Norco dcd and ordered lidoderm patch for back pain

## 2024-03-08 NOTE — PLAN OF CARE
Inpatient Upgrade Note    Roselia Eldridge has warranted treatment spanning two or more midnights of hospital level care for the management of  dehydration . She continues to require daily labs, supplemental oxygen, monitoring of vital signs, medication adjustments, and neb treatments q 6 hours . Her condition is also complicated by the following comorbidities: Obesity.

## 2024-03-08 NOTE — PT/OT/SLP PROGRESS
"Occupational Therapy      Patient Name:  Roselia Eldridge   MRN:  [id number]    Patient not seen today secondary to Therapist assessment. Patient hallucinating and very confused today. When asked of patient wanted to participate patient repiled, "I'm too tired.. I haven't slept all night." Patient extremely preoccupied with "the man" standing in the corner of her room that would not leave. Will follow-up 3/11/2024.    Lyric Ring, OTR/L, CSRS  3/8/2024  "

## 2024-03-08 NOTE — PT/OT/SLP PROGRESS
Physical Therapy      Patient Name:  Roselia Eldridge   MRN:  [id number]    Patient not seen today secondary to Therapist assessment. Therapy was held today due to patient having hallucinations that is causing her to be unable to participate with therapy today. Patient's nurse is aware of the situation. Will follow-up 03/11/2024.    ROCHELLE Velásquez   03/08/2024

## 2024-03-08 NOTE — ASSESSMENT & PLAN NOTE
Patient has hypokalemia which is Acute and currently controlled. Most recent potassium levels reviewed-   Lab Results   Component Value Date    K 3.7 03/08/2024   . Will continue potassium replacement per protocol and recheck repeat levels after replacement completed.

## 2024-03-08 NOTE — ASSESSMENT & PLAN NOTE
03/07/24 reports no bm in past 3 days  Give dulcolax today   Stool softener daily      03/08/24 passing gas but no bm   Dulcolax ordered prn

## 2024-03-08 NOTE — PLAN OF CARE
Problem: Adult Inpatient Plan of Care  Goal: Patient-Specific Goal (Individualized)  Outcome: Ongoing, Progressing  Goal: Absence of Hospital-Acquired Illness or Injury  Outcome: Ongoing, Progressing     Problem: Fall Injury Risk  Goal: Absence of Fall and Fall-Related Injury  Outcome: Ongoing, Progressing  Intervention: Promote Injury-Free Environment  Flowsheets (Taken 3/8/2024 0614)  Safety Promotion/Fall Prevention:   assistive device/personal item within reach   instructed to call staff for mobility   side rails raised x 2     Problem: Bleeding (Orthopaedic Fracture)  Goal: Absence of Bleeding  Outcome: Ongoing, Progressing

## 2024-03-08 NOTE — ASSESSMENT & PLAN NOTE
03/08/24 visual hallucinations first noted after she recd norco yesterday.  Norco dcd  She continues to have hallucinations- states she is aware of having hallucinations  She is oriented x 3 .

## 2024-03-08 NOTE — PROGRESS NOTES
[facility] - Medical Surgical Unit  Hospital Medicine  Progress Note    Patient Name: Roselia Eldridge  MRN: [id number]  Patient Class: IP- Inpatient   Admission Date: 3/6/2024  Length of Stay: 0 days  Attending Physician: Galindo De Jesus IV, DO  Primary Care Provider: Pamela Jurado DO        Subjective:     Principal Problem:Dehydration        HPI:  Patient presents for evaluation of back pain. She has a known T4 compression fracture but also reports right rib pain after an incident tonight where she felt her knees lock up and eased herself down striking her ribs on a box on the floor. She denies any LOC or head injury during this event or the prior event. She was alert and oriented x4. GCS 15. She reports that she has been barely mobile since her diagnosis of the compression fracture in her back. Also reports decreased intake and inability to care for herself at home. She did receive 100 mcg of fentanyl prior to arrival for pain control and reports mild improvement. She was not received in a TLSO brace. We will obtain lab specimens for further evaluation and perform a chest x-ray to rule out any underlying bony abnormality related to her recent rib injury. We will provide her rehydration with normal saline 1 L bolus IV. She did also have some wheezing noted on exam and reports a prior history of asthma. We will provide her with DuoNeb nebulizer treatment. We will also provide her with a TLSO brace for comfort and pain control. Labs were reviewed and showed a potassium at 3.0. We will provide her with 40 mEq of potassium p.o.. She also reports continued pain despite the fentanyl prior to arrival so we will attempt hydrocodone 5 mg p.o. as she was previously prescribed but unable to obtain. Workup does reveal an elevated white blood cell count but chest x-ray and urine sample showed no signs of infection. She does appear to be dehydrated due to her decreased oral intake since the initial injury. We did  discuss the case with Dr. Myers who is agreed to admit the patient at [facility] for impaired mobility in ADLs, intractable pain, hypokalemia, and dehydration.       Above is ER record from 03/06/24.     Mrs. Eldridge is resting in bed this morning. She is awake, alert and oriented. Complains of reflux this morning. States she needs her protonix- already ordered. Complains of thoracic back pain. Has TLSO brace on. States she wants her muscle relaxer and norco. States norco 5 is not doing anything for her pain. Dc'd norco 5 and baclofen. Increased norco to every 6 hours prn pain. Potassium 3.2- will replace orally.   She states she is not interested in swingbed for rehab. States she wants to discharge home. Talked with her re home environment. States she lives alone but she does have neighbor who can help her if needed. Will have PT screening. She is interested in home health with PT and OT at discharge.   Low grade temp of 100 this morning. Swab for influenza and covid negative. Sat 94% on one liter.       Overview/Hospital Course:  03/07/2024-mild improvement in prerenal azotemia.  Still likely intravascular volume depletion and recommend continuing fluids.  Replete electrolytes as indicated.    1300- went to check on Mrs. Eldridge. She has no complaints. She is looking at ceiling stating that her mastercard statement is up there. Oriented to person, place and time. Consulted with Dr. Neal novak visual hallucinations. Will dc norco and add lidoderm patch and naprosyn.      03/08/24 Resting in bed with HOB flat. Complains of reflux. Encouraged her to allow HOB to be raised.  States back pain is better with lidoderm patch . Reports level 6 when she moves. She is oriented x 3 but continues to complain of having visual hallucinations. States she is seeing little black girls dancing on the ceiling. WBC 10.92 . She is afebrile. Last dose of norco was around 1100 on 03/07/24. Has TLSO brace on due to compression fracture  seen on CT 03/04. She is very worried about her neighbor feeding her dogs. Nurses have contacted neighbor per her request re this. Talked with Mrs. Eldridge re needing swing bed due to the fact that she lives alone and she is also having delerium. Will place PT and OT evaluations. She is agreeable to swing bed. Probable dc from acute care to swing bed tomorrow.    Interval History:back pain, delerium    Review of Systems   Constitutional:  Positive for appetite change. Negative for fatigue.   HENT:  Negative for sinus pressure, sinus pain, sore throat and trouble swallowing.    Eyes:  Negative for discharge and itching.   Respiratory:  Negative for cough and shortness of breath.    Cardiovascular:  Negative for chest pain, palpitations and leg swelling.   Gastrointestinal:  Negative for abdominal distention, abdominal pain, constipation, diarrhea, nausea and vomiting.   Genitourinary:  Negative for dysuria.        Reports having occasional urinary incontinence   Musculoskeletal:  Positive for arthralgias and back pain. Negative for neck pain and neck stiffness.        Thoracic back pain    Skin:  Negative for color change, pallor, rash and wound.   Neurological:  Positive for weakness. Negative for light-headedness and headaches.     Objective:     Vital Signs (Most Recent):  Temp: 98.9 °F (37.2 °C) (03/08/24 1227)  Pulse: 87 (03/08/24 1327)  Resp: 18 (03/08/24 1327)  BP: (!) 143/81 (03/08/24 1227)  SpO2: 95 % (03/08/24 1327) Vital Signs (24h Range):  Temp:  [98 °F (36.7 °C)-98.9 °F (37.2 °C)] 98.9 °F (37.2 °C)  Pulse:  [73-87] 87  Resp:  [18-20] 18  SpO2:  [92 %-98 %] 95 %  BP: (119-166)/(69-81) 143/81     Weight: 90.7 kg (200 lb)  Body mass index is 33.28 kg/m².    Intake/Output Summary (Last 24 hours) at 3/8/2024 1337  Last data filed at 3/8/2024 1317  Gross per 24 hour   Intake 630 ml   Output 3450 ml   Net -2820 ml         Physical Exam  HENT:      Head: Normocephalic.      Mouth/Throat:      Mouth: Mucous  membranes are moist.   Cardiovascular:      Rate and Rhythm: Normal rate and regular rhythm.      Pulses: Normal pulses.      Heart sounds: Normal heart sounds. No murmur heard.     No friction rub. No gallop.   Pulmonary:      Effort: Pulmonary effort is normal. No respiratory distress.      Breath sounds: Normal breath sounds. No stridor. No wheezing, rhonchi or rales.   Chest:      Chest wall: No tenderness.   Abdominal:      General: Bowel sounds are normal. There is no distension.      Palpations: Abdomen is soft. There is no mass.      Tenderness: There is no abdominal tenderness. There is no guarding or rebound.      Hernia: No hernia is present.   Musculoskeletal:      Cervical back: Normal range of motion.      Comments: Thoracic back pain - tlso brace on   Skin:     General: Skin is warm and dry.   Neurological:      Mental Status: She is alert and oriented to person, place, and time.      Motor: Weakness present.   Psychiatric:         Mood and Affect: Mood normal.             Significant Labs: All pertinent labs within the past 24 hours have been reviewed.  Recent Lab Results         03/08/24  0504   03/08/24  0500   03/07/24  1523        Anion Gap   10         Baso #   0.02         Basophil %   0.2         BUN   11         BUN/CREAT RATIO   15         Calcium   8.8         Chloride   97         CO2   29         Creatinine   0.72         Differential Method   Auto         eGFR   86         Eos #   0.09         Eos %   0.8         Glucose   96         Hematocrit   30.1         Hemoglobin   9.9         Lymph #   0.99         Lymph %   9.1         Magnesium  2.0           MCH   32.0         MCHC   32.9         MCV   97.4         Mono #   0.90         Mono %   8.2         MPV   9.1         Neutrophils, Abs   8.92         Neutrophils Relative   81.7         Platelet Count   241         Potassium   3.7   4.0       RBC   3.09         RDW   12.5         Sodium   132         WBC   10.92                  Significant Imaging: I have reviewed all pertinent imaging results/findings within the past 24 hours.    Assessment/Plan:      Dehydration  Continue IV ns       03/08/24 continue NS      Poor appetite    03/08/24 nurses report poor appetite - she is only eating about 25 % of meals served    Visual hallucinations    03/08/24 visual hallucinations first noted after she recd norco yesterday.  Norco dcd  She continues to have hallucinations- states she is aware of having hallucinations  She is oriented x 3 .      Palpitations    EKG done - showed occasional NSR with occasional pvc    Acute midline back pain    03/08/24 has taken ultram [facility] for pain relief- states did not help  Tried norco yesterday  Complained of visual hallucinations after norco   Norco dcd and ordered lidoderm patch for back pain     Impaired mobility and ADLs    03/08/24   Compression fracture t4 - age indeterminate   TLSO brace  PT and OT evaluation done  Plan is to dc to swing bed tomorrow- continue therapy for strengthening    Constipation    03/07/24 reports no bm in past 3 days  Give dulcolax today   Stool softener daily      03/08/24 passing gas but no bm   Dulcolax ordered prn      Dyslipidemia  03/07/24 continue statin      03/08/24 continue statin    GERD (gastroesophageal reflux disease)  03/07/24 continue protonix     03/08/24 continue protonix    Hypokalemia  Patient has hypokalemia which is Acute and currently controlled. Most recent potassium levels reviewed-   Lab Results   Component Value Date    K 3.7 03/08/2024   . Will continue potassium replacement per protocol and recheck repeat levels after replacement completed.     Thoracic compression fracture    CT shows questionable mild age-indeterminate compression fracture of T4, correlate with point tenderness.   TLSO brace on - norco for pain relief        Dcd norco - due to hallucinations after taking this     03/08/24 Lidoderm patch and tylenol for pain relief  PT and OT to  evaluate and treat         VTE Risk Mitigation (From admission, onward)           Ordered     enoxaparin injection 40 mg  Daily         03/07/24 0658     Place JUNITO hose  Until discontinued         03/07/24 0658     IP VTE HIGH RISK PATIENT  Once         03/07/24 0046     Place sequential compression device  Until discontinued         03/07/24 0046     Place JUNITO hose  Until discontinued         03/07/24 0046                    Discharge Planning   MARILEE:      Code Status: DNR   Is the patient medically ready for discharge?:     Reason for patient still in hospital (select all that apply): Treatment  Discharge Plan A: Home, Home Health                  LESLEE Yoo  Department of Hospital Medicine   [facility] - Medical Surgical Unit

## 2024-03-09 PROBLEM — R29.6 FREQUENT FALLS: Status: RESOLVED | Noted: 2024-03-08 | Resolved: 2024-03-09

## 2024-03-09 PROBLEM — E86.0 DEHYDRATION: Status: RESOLVED | Noted: 2024-03-07 | Resolved: 2024-03-09

## 2024-03-09 PROBLEM — E87.6 HYPOKALEMIA: Status: RESOLVED | Noted: 2024-03-07 | Resolved: 2024-03-09

## 2024-03-09 LAB
ANION GAP SERPL CALCULATED.3IONS-SCNC: 13 MMOL/L (ref 7–16)
BASOPHILS # BLD AUTO: 0.01 K/UL (ref 0–0.2)
BASOPHILS NFR BLD AUTO: 0.1 % (ref 0–1)
BUN SERPL-MCNC: 16 MG/DL (ref 7–18)
BUN/CREAT SERPL: 23 (ref 6–20)
CALCIUM SERPL-MCNC: 8.9 MG/DL (ref 8.5–10.1)
CHLORIDE SERPL-SCNC: 96 MMOL/L (ref 98–107)
CO2 SERPL-SCNC: 29 MMOL/L (ref 21–32)
CREAT SERPL-MCNC: 0.69 MG/DL (ref 0.55–1.02)
DIFFERENTIAL METHOD BLD: ABNORMAL
EGFR (NO RACE VARIABLE) (RUSH/TITUS): 90 ML/MIN/1.73M2
EOSINOPHIL # BLD AUTO: 0.07 K/UL (ref 0–0.5)
EOSINOPHIL NFR BLD AUTO: 0.6 % (ref 1–4)
EOSINOPHIL NFR BLD MANUAL: 2 % (ref 1–4)
ERYTHROCYTE [DISTWIDTH] IN BLOOD BY AUTOMATED COUNT: 12.4 % (ref 11.5–14.5)
GLUCOSE SERPL-MCNC: 112 MG/DL (ref 74–106)
HCT VFR BLD AUTO: 29.6 % (ref 38–47)
HGB BLD-MCNC: 9.8 G/DL (ref 12–16)
HYPOCHROMIA BLD QL SMEAR: ABNORMAL
LYMPHOCYTES # BLD AUTO: 1.02 K/UL (ref 1–4.8)
LYMPHOCYTES NFR BLD AUTO: 9.3 % (ref 27–41)
LYMPHOCYTES NFR BLD MANUAL: 13 % (ref 27–41)
MCH RBC QN AUTO: 31.6 PG (ref 27–31)
MCHC RBC AUTO-ENTMCNC: 33.1 G/DL (ref 32–36)
MCV RBC AUTO: 95.5 FL (ref 80–96)
MONOCYTES # BLD AUTO: 1.03 K/UL (ref 0–0.8)
MONOCYTES NFR BLD AUTO: 9.4 % (ref 2–6)
MONOCYTES NFR BLD MANUAL: 2 % (ref 2–6)
MPC BLD CALC-MCNC: 8.7 FL (ref 9.4–12.4)
NEUTROPHILS # BLD AUTO: 8.87 K/UL (ref 1.8–7.7)
NEUTROPHILS NFR BLD AUTO: 80.6 % (ref 53–65)
NEUTS BAND NFR BLD MANUAL: 3 % (ref 1–5)
NEUTS SEG NFR BLD MANUAL: 80 % (ref 50–62)
NRBC BLD MANUAL-RTO: ABNORMAL %
PLATELET # BLD AUTO: 251 K/UL (ref 150–400)
PLATELET MORPHOLOGY: NORMAL
POTASSIUM SERPL-SCNC: 3.7 MMOL/L (ref 3.5–5.1)
RBC # BLD AUTO: 3.1 M/UL (ref 4.2–5.4)
SODIUM SERPL-SCNC: 134 MMOL/L (ref 136–145)
WBC # BLD AUTO: 11 K/UL (ref 4.5–11)

## 2024-03-09 PROCEDURE — 25000003 PHARM REV CODE 250: Performed by: NURSE PRACTITIONER

## 2024-03-09 PROCEDURE — 25000003 PHARM REV CODE 250

## 2024-03-09 PROCEDURE — 94761 N-INVAS EAR/PLS OXIMETRY MLT: CPT

## 2024-03-09 PROCEDURE — 85025 COMPLETE CBC W/AUTO DIFF WBC: CPT

## 2024-03-09 PROCEDURE — 63600175 PHARM REV CODE 636 W HCPCS: Performed by: NURSE PRACTITIONER

## 2024-03-09 PROCEDURE — A6212 FOAM DRG <=16 SQ IN W/BORDER: HCPCS

## 2024-03-09 PROCEDURE — 27000221 HC OXYGEN, UP TO 24 HOURS

## 2024-03-09 PROCEDURE — 25000242 PHARM REV CODE 250 ALT 637 W/ HCPCS

## 2024-03-09 PROCEDURE — 94640 AIRWAY INHALATION TREATMENT: CPT | Mod: XB

## 2024-03-09 PROCEDURE — 11000001 HC ACUTE MED/SURG PRIVATE ROOM

## 2024-03-09 PROCEDURE — 25000003 PHARM REV CODE 250: Performed by: FAMILY MEDICINE

## 2024-03-09 PROCEDURE — 80048 BASIC METABOLIC PNL TOTAL CA: CPT

## 2024-03-09 RX ORDER — HYDROCORTISONE 25 MG/G
CREAM TOPICAL 2 TIMES DAILY
Status: DISCONTINUED | OUTPATIENT
Start: 2024-03-09 | End: 2024-03-09

## 2024-03-09 RX ORDER — HYDROCORTISONE 25 MG/G
CREAM TOPICAL 2 TIMES DAILY
Status: DISCONTINUED | OUTPATIENT
Start: 2024-03-09 | End: 2024-03-11 | Stop reason: HOSPADM

## 2024-03-09 RX ADMIN — BISACODYL 10 MG: 5 TABLET, COATED ORAL at 05:03

## 2024-03-09 RX ADMIN — ASPIRIN 325 MG ORAL TABLET 325 MG: 325 PILL ORAL at 07:03

## 2024-03-09 RX ADMIN — POLYETHYLENE GLYCOL 3350 17 G: 17 POWDER, FOR SOLUTION ORAL at 08:03

## 2024-03-09 RX ADMIN — ATORVASTATIN CALCIUM 20 MG: 10 TABLET, FILM COATED ORAL at 08:03

## 2024-03-09 RX ADMIN — PANTOPRAZOLE SODIUM 40 MG: 40 TABLET, DELAYED RELEASE ORAL at 08:03

## 2024-03-09 RX ADMIN — LIDOCAINE 1 PATCH: 50 PATCH CUTANEOUS at 01:03

## 2024-03-09 RX ADMIN — HYDROCORTISONE 2.5%: 25 CREAM TOPICAL at 09:03

## 2024-03-09 RX ADMIN — IPRATROPIUM BROMIDE AND ALBUTEROL SULFATE 3 ML: 2.5; .5 SOLUTION RESPIRATORY (INHALATION) at 02:03

## 2024-03-09 RX ADMIN — HYDROCORTISONE 2.5%: 25 CREAM TOPICAL at 01:03

## 2024-03-09 RX ADMIN — IBUPROFEN 600 MG: 200 TABLET, FILM COATED ORAL at 01:03

## 2024-03-09 RX ADMIN — IPRATROPIUM BROMIDE AND ALBUTEROL SULFATE 3 ML: 2.5; .5 SOLUTION RESPIRATORY (INHALATION) at 07:03

## 2024-03-09 RX ADMIN — METOPROLOL SUCCINATE 50 MG: 50 TABLET, FILM COATED, EXTENDED RELEASE ORAL at 09:03

## 2024-03-09 RX ADMIN — IBUPROFEN 600 MG: 200 TABLET, FILM COATED ORAL at 05:03

## 2024-03-09 RX ADMIN — ENOXAPARIN SODIUM 40 MG: 40 INJECTION SUBCUTANEOUS at 05:03

## 2024-03-09 RX ADMIN — SERTRALINE HYDROCHLORIDE 100 MG: 50 TABLET ORAL at 08:03

## 2024-03-09 RX ADMIN — METOPROLOL SUCCINATE 50 MG: 50 TABLET, FILM COATED, EXTENDED RELEASE ORAL at 08:03

## 2024-03-09 NOTE — ASSESSMENT & PLAN NOTE
03/08/24 has taken ultram at [facility] for pain relief- states did not help  Tried norco yesterday  Complained of visual hallucinations after norco   Norco dcd and ordered lidoderm patch for back pain     03/11/24 pain controlled with lidocaine patch and ibuprofen   TLSO brace while out of bed

## 2024-03-09 NOTE — PLAN OF CARE
Problem: Adult Inpatient Plan of Care  Goal: Plan of Care Review  Outcome: Ongoing, Progressing  Flowsheets (Taken 3/9/2024 0831)  Plan of Care Reviewed With: patient  Goal: Patient-Specific Goal (Individualized)  Outcome: Ongoing, Progressing  Flowsheets (Taken 3/9/2024 0831)  Anxieties, Fears or Concerns: to gain greater mobility  Individualized Care Needs: To have increased mobility within the next week to perform at least 50% ADLs

## 2024-03-10 PROBLEM — R00.2 PALPITATIONS: Status: RESOLVED | Noted: 2024-03-07 | Resolved: 2024-03-10

## 2024-03-10 PROBLEM — R44.1 VISUAL HALLUCINATIONS: Status: RESOLVED | Noted: 2024-03-08 | Resolved: 2024-03-10

## 2024-03-10 PROBLEM — K59.00 CONSTIPATION: Status: RESOLVED | Noted: 2024-03-07 | Resolved: 2024-03-10

## 2024-03-10 LAB
ANION GAP SERPL CALCULATED.3IONS-SCNC: 11 MMOL/L (ref 7–16)
BASOPHILS # BLD AUTO: 0.01 K/UL (ref 0–0.2)
BASOPHILS NFR BLD AUTO: 0.1 % (ref 0–1)
BUN SERPL-MCNC: 16 MG/DL (ref 7–18)
BUN/CREAT SERPL: 22 (ref 6–20)
CALCIUM SERPL-MCNC: 9 MG/DL (ref 8.5–10.1)
CHLORIDE SERPL-SCNC: 97 MMOL/L (ref 98–107)
CO2 SERPL-SCNC: 30 MMOL/L (ref 21–32)
CREAT SERPL-MCNC: 0.74 MG/DL (ref 0.55–1.02)
DIFFERENTIAL METHOD BLD: ABNORMAL
EGFR (NO RACE VARIABLE) (RUSH/TITUS): 83 ML/MIN/1.73M2
EOSINOPHIL # BLD AUTO: 0.17 K/UL (ref 0–0.5)
EOSINOPHIL NFR BLD AUTO: 1.6 % (ref 1–4)
ERYTHROCYTE [DISTWIDTH] IN BLOOD BY AUTOMATED COUNT: 12.5 % (ref 11.5–14.5)
GLUCOSE SERPL-MCNC: 118 MG/DL (ref 74–106)
HCT VFR BLD AUTO: 27.8 % (ref 38–47)
HGB BLD-MCNC: 9.3 G/DL (ref 12–16)
LYMPHOCYTES # BLD AUTO: 1.01 K/UL (ref 1–4.8)
LYMPHOCYTES NFR BLD AUTO: 9.7 % (ref 27–41)
LYMPHOCYTES NFR BLD MANUAL: 9 % (ref 27–41)
MCH RBC QN AUTO: 32 PG (ref 27–31)
MCHC RBC AUTO-ENTMCNC: 33.5 G/DL (ref 32–36)
MCV RBC AUTO: 95.5 FL (ref 80–96)
MONOCYTES # BLD AUTO: 0.99 K/UL (ref 0–0.8)
MONOCYTES NFR BLD AUTO: 9.5 % (ref 2–6)
MONOCYTES NFR BLD MANUAL: 7 % (ref 2–6)
MPC BLD CALC-MCNC: 9.2 FL (ref 9.4–12.4)
NEUTROPHILS # BLD AUTO: 8.26 K/UL (ref 1.8–7.7)
NEUTROPHILS NFR BLD AUTO: 79.1 % (ref 53–65)
NEUTS BAND NFR BLD MANUAL: 5 % (ref 1–5)
NEUTS SEG NFR BLD MANUAL: 79 % (ref 50–62)
NRBC BLD MANUAL-RTO: ABNORMAL %
PLATELET # BLD AUTO: 244 K/UL (ref 150–400)
PLATELET MORPHOLOGY: NORMAL
POTASSIUM SERPL-SCNC: 3.9 MMOL/L (ref 3.5–5.1)
RBC # BLD AUTO: 2.91 M/UL (ref 4.2–5.4)
RBC MORPH BLD: NORMAL
SODIUM SERPL-SCNC: 134 MMOL/L (ref 136–145)
WBC # BLD AUTO: 10.44 K/UL (ref 4.5–11)

## 2024-03-10 PROCEDURE — 94761 N-INVAS EAR/PLS OXIMETRY MLT: CPT

## 2024-03-10 PROCEDURE — 63600175 PHARM REV CODE 636 W HCPCS: Performed by: NURSE PRACTITIONER

## 2024-03-10 PROCEDURE — 80048 BASIC METABOLIC PNL TOTAL CA: CPT

## 2024-03-10 PROCEDURE — 27000221 HC OXYGEN, UP TO 24 HOURS

## 2024-03-10 PROCEDURE — 25000003 PHARM REV CODE 250: Performed by: NURSE PRACTITIONER

## 2024-03-10 PROCEDURE — 25000003 PHARM REV CODE 250

## 2024-03-10 PROCEDURE — 85025 COMPLETE CBC W/AUTO DIFF WBC: CPT

## 2024-03-10 PROCEDURE — 25000242 PHARM REV CODE 250 ALT 637 W/ HCPCS

## 2024-03-10 PROCEDURE — 11000001 HC ACUTE MED/SURG PRIVATE ROOM

## 2024-03-10 PROCEDURE — 94640 AIRWAY INHALATION TREATMENT: CPT

## 2024-03-10 RX ADMIN — ATORVASTATIN CALCIUM 20 MG: 10 TABLET, FILM COATED ORAL at 08:03

## 2024-03-10 RX ADMIN — HYDROCORTISONE 2.5%: 25 CREAM TOPICAL at 08:03

## 2024-03-10 RX ADMIN — IPRATROPIUM BROMIDE AND ALBUTEROL SULFATE 3 ML: 2.5; .5 SOLUTION RESPIRATORY (INHALATION) at 02:03

## 2024-03-10 RX ADMIN — METOPROLOL SUCCINATE 50 MG: 50 TABLET, FILM COATED, EXTENDED RELEASE ORAL at 09:03

## 2024-03-10 RX ADMIN — MELATONIN TAB 3 MG 6 MG: 3 TAB at 03:03

## 2024-03-10 RX ADMIN — LIDOCAINE 1 PATCH: 50 PATCH CUTANEOUS at 02:03

## 2024-03-10 RX ADMIN — MELATONIN TAB 3 MG 6 MG: 3 TAB at 09:03

## 2024-03-10 RX ADMIN — IBUPROFEN 600 MG: 200 TABLET, FILM COATED ORAL at 05:03

## 2024-03-10 RX ADMIN — IPRATROPIUM BROMIDE AND ALBUTEROL SULFATE 3 ML: 2.5; .5 SOLUTION RESPIRATORY (INHALATION) at 07:03

## 2024-03-10 RX ADMIN — METOPROLOL SUCCINATE 50 MG: 50 TABLET, FILM COATED, EXTENDED RELEASE ORAL at 08:03

## 2024-03-10 RX ADMIN — HYDROCORTISONE 2.5%: 25 CREAM TOPICAL at 09:03

## 2024-03-10 RX ADMIN — POLYETHYLENE GLYCOL 3350 17 G: 17 POWDER, FOR SOLUTION ORAL at 08:03

## 2024-03-10 RX ADMIN — SERTRALINE HYDROCHLORIDE 100 MG: 50 TABLET ORAL at 08:03

## 2024-03-10 RX ADMIN — IBUPROFEN 600 MG: 200 TABLET, FILM COATED ORAL at 08:03

## 2024-03-10 RX ADMIN — PANTOPRAZOLE SODIUM 40 MG: 40 TABLET, DELAYED RELEASE ORAL at 08:03

## 2024-03-10 RX ADMIN — ENOXAPARIN SODIUM 40 MG: 40 INJECTION SUBCUTANEOUS at 05:03

## 2024-03-10 NOTE — PLAN OF CARE
Problem: Adult Inpatient Plan of Care  Goal: Plan of Care Review  Outcome: Ongoing, Progressing  Flowsheets (Taken 3/10/2024 0507)  Plan of Care Reviewed With: patient  Goal: Patient-Specific Goal (Individualized)  Outcome: Ongoing, Progressing  Goal: Absence of Hospital-Acquired Illness or Injury  Outcome: Ongoing, Progressing  Intervention: Identify and Manage Fall Risk  Flowsheets (Taken 3/10/2024 0507)  Safety Promotion/Fall Prevention:   bed alarm refused   diversional activities provided   Fall Risk reviewed with patient/family   medications reviewed   nonskid shoes/socks when out of bed   room near unit station   instructed to call staff for mobility   supervised activity  Intervention: Prevent Skin Injury  Flowsheets (Taken 3/10/2024 0507)  Body Position:   position changed independently   head facing, right   side-lying  Skin Protection: incontinence pads utilized  Intervention: Prevent and Manage VTE (Venous Thromboembolism) Risk  Flowsheets (Taken 3/10/2024 0507)  Activity Management:   Heel slide - L1   Arm raise - L1  VTE Prevention/Management: fluids promoted  Range of Motion: active ROM (range of motion) encouraged  Intervention: Prevent Infection  Flowsheets (Taken 3/10/2024 0507)  Infection Prevention:   hand hygiene promoted   rest/sleep promoted   personal protective equipment utilized   single patient room provided   equipment surfaces disinfected  Goal: Optimal Comfort and Wellbeing  Outcome: Ongoing, Progressing  Intervention: Monitor Pain and Promote Comfort  Flowsheets (Taken 3/10/2024 0507)  Pain Management Interventions:   quiet environment facilitated   medication offered but refused  Intervention: Provide Person-Centered Care  Flowsheets (Taken 3/10/2024 0507)  Trust Relationship/Rapport:   questions answered   questions encouraged   thoughts/feelings acknowledged   choices provided   care explained   emotional support provided   empathic listening provided  Goal: Readiness for  Transition of Care  Outcome: Ongoing, Progressing  Intervention: Mutually Develop Transition Plan  Flowsheets (Taken 3/10/2024 0507)  Do you expect to return to your current living situation?: Yes  Do you have help at home or someone to help you manage your care at home?: No  Readmission within 30 days?: No     Problem: Fall Injury Risk  Goal: Absence of Fall and Fall-Related Injury  Outcome: Ongoing, Progressing  Intervention: Identify and Manage Contributors  Flowsheets (Taken 3/10/2024 0507)  Self-Care Promotion: independence encouraged  Medication Review/Management:   medications reviewed   provider consulted  Intervention: Promote Injury-Free Environment  Flowsheets (Taken 3/10/2024 0507)  Safety Promotion/Fall Prevention:   bed alarm refused   diversional activities provided   Fall Risk reviewed with patient/family   medications reviewed   nonskid shoes/socks when out of bed   room near unit station   instructed to call staff for mobility   supervised activity     Problem: Skin Injury Risk Increased  Goal: Skin Health and Integrity  Outcome: Ongoing, Progressing  Intervention: Optimize Skin Protection  Flowsheets (Taken 3/10/2024 0507)  Pressure Reduction Techniques:   frequent weight shift encouraged   weight shift assistance provided  Pressure Reduction Devices: (foam padding refused) positioning supports utilized  Skin Protection: incontinence pads utilized  Head of Bed (HOB) Positioning: HOB elevated     Problem: Bleeding (Orthopaedic Fracture)  Goal: Absence of Bleeding  Outcome: Ongoing, Progressing     Problem: Embolism (Orthopaedic Fracture)  Goal: Absence of Embolism Signs and Symptoms  Outcome: Ongoing, Progressing  Intervention: Prevent or Manage Embolism Risk  Flowsheets (Taken 3/10/2024 0507)  VTE Prevention/Management: fluids promoted     Problem: Fracture Stabilization and Management (Orthopaedic Fracture)  Goal: Fracture Stability  Outcome: Ongoing, Progressing     Problem: Functional Ability  Impaired (Orthopaedic Fracture)  Goal: Optimal Functional Ability  Outcome: Ongoing, Progressing  Intervention: Optimize Functional Ability  Flowsheets (Taken 3/10/2024 5502)  Self-Care Promotion: independence encouraged  Activity Management:   Heel slide - L1   Arm raise - L1  Positioning/Transfer Devices:   pillows   removed  Range of Motion: active ROM (range of motion) encouraged     Problem: Infection (Orthopaedic Fracture)  Goal: Absence of Infection Signs and Symptoms  Outcome: Ongoing, Progressing     Problem: Neurovascular Compromise (Orthopaedic Fracture)  Goal: Effective Tissue Perfusion  Outcome: Ongoing, Progressing     Problem: Pain (Orthopaedic Fracture)  Goal: Acceptable Pain Control  Outcome: Ongoing, Progressing     Problem: Respiratory Compromise (Orthopaedic Fracture)  Goal: Effective Oxygenation and Ventilation  Outcome: Ongoing, Progressing     Problem: Pain Acute  Goal: Acceptable Pain Control and Functional Ability  Outcome: Ongoing, Progressing     Problem: Infection  Goal: Absence of Infection Signs and Symptoms  Outcome: Ongoing, Progressing

## 2024-03-10 NOTE — NURSING
Unable to discharge patient out of computer at present due to EPIC problem. Notified provider.    [id number] Contacted IS per NP request regarding the inability discharge patient out of EPIC. Is staff reported that the issue would have to be escalated to EPIC Help support. Confirmation # [id number].

## 2024-03-10 NOTE — NURSING
"0145- bed alarm sounded in patient room, found patient raising up with legs to right side of bed. Stated she wanted to go to the bathroom. I asked her if it would be alright for her to use the bedpan as it would be safer at this time. Educated patient on brace and importance for her to use when out of bed.  Patient verbally understood. Placed bedpan on patient. CNA entered room to assist. On floor beside patient's right side of bed were bed pads, one soaked with urine in a plastic bag. CNA gathered to throw away, patient requested she leave the bed pads on the floor. CNA removed items from floor, placed in trash for safety purposes. Patient had stool present requested to clean self. We supplied wipes to patient and offered assistance. Observation of patient wiping from anus area to vagina. Educated patient on proper cleaning hygiene. Patient refused teaching but allowed assistance stating, "This is shit." Assisted patient with cleaning and to position of comfort. Patient at this time refused sacral foam pad.  Patient requested bed alarm be turned off. I educated patient on safety and reasoning for bed alarm. Offered assistance by turning bed alarm off as requested, staying close to her room, turning off lights in her room, leaving the door open. Patient refused, stating she wanted her door closed and bed alarm off. Patient requested telemetry box be placed on bedside table, I educated patient on leads and reasoning why it may fall. Requested she leave it on bed beside her. Patient verbally understood. She requested to speak to the NP concerning bed alarm.   Spoke to NP and requested she come to speak with patient.   NP came to room as requested. Telemetry removed per NP.   "

## 2024-03-10 NOTE — PLAN OF CARE
Problem: Adult Inpatient Plan of Care  Goal: Plan of Care Review  3/10/2024 1224 by Yoselin Alaniz, JOSE JUAN  Outcome: Ongoing, Progressing  3/10/2024 0912 by Yoselin Alaniz RN  Outcome: Ongoing, Progressing  Flowsheets (Taken 3/10/2024 0912)  Plan of Care Reviewed With: patient  Goal: Patient-Specific Goal (Individualized)  3/10/2024 1224 by Yoselin Alaniz, RN  Outcome: Ongoing, Progressing  3/10/2024 0912 by Yoselin Alaniz, RN  Outcome: Ongoing, Progressing  Flowsheets (Taken 3/10/2024 0912)  Anxieties, Fears or Concerns: To gain greater mobility and independence in performing ADLs  Individualized Care Needs: To gain increased mobility within the next week

## 2024-03-10 NOTE — PLAN OF CARE
Problem: Adult Inpatient Plan of Care  Goal: Plan of Care Review  3/10/2024 1225 by Yoselin Alaniz RN  Outcome: Met  3/10/2024 1224 by Yoselin Alaniz RN  Outcome: Ongoing, Progressing  3/10/2024 0912 by Yoselin Alaniz RN  Outcome: Ongoing, Progressing  Flowsheets (Taken 3/10/2024 0912)  Plan of Care Reviewed With: patient  Goal: Patient-Specific Goal (Individualized)  3/10/2024 1225 by Yoselin Alaniz RN  Outcome: Met  3/10/2024 1224 by Yoselin Alaniz RN  Outcome: Ongoing, Progressing  3/10/2024 0912 by Yoselin Alaniz RN  Outcome: Ongoing, Progressing  Flowsheets (Taken 3/10/2024 0912)  Anxieties, Fears or Concerns: To gain greater mobility and independence in performing ADLs  Individualized Care Needs: To gain increased mobility within the next week  Goal: Absence of Hospital-Acquired Illness or Injury  Outcome: Met  Goal: Optimal Comfort and Wellbeing  Outcome: Met  Goal: Readiness for Transition of Care  Outcome: Met     Problem: Fall Injury Risk  Goal: Absence of Fall and Fall-Related Injury  Outcome: Met     Problem: Skin Injury Risk Increased  Goal: Skin Health and Integrity  Outcome: Met     Problem: Bleeding (Orthopaedic Fracture)  Goal: Absence of Bleeding  Outcome: Met     Problem: Embolism (Orthopaedic Fracture)  Goal: Absence of Embolism Signs and Symptoms  Outcome: Met     Problem: Fracture Stabilization and Management (Orthopaedic Fracture)  Goal: Fracture Stability  Outcome: Met     Problem: Functional Ability Impaired (Orthopaedic Fracture)  Goal: Optimal Functional Ability  Outcome: Met     Problem: Infection (Orthopaedic Fracture)  Goal: Absence of Infection Signs and Symptoms  Outcome: Met     Problem: Neurovascular Compromise (Orthopaedic Fracture)  Goal: Effective Tissue Perfusion  Outcome: Met     Problem: Pain (Orthopaedic Fracture)  Goal: Acceptable Pain Control  Outcome: Met     Problem: Respiratory Compromise (Orthopaedic Fracture)  Goal: Effective Oxygenation and  Ventilation  Outcome: Met     Problem: Pain Acute  Goal: Acceptable Pain Control and Functional Ability  Outcome: Met     Problem: Infection  Goal: Absence of Infection Signs and Symptoms  Outcome: Met

## 2024-03-10 NOTE — NURSING
"0305 Pt reports having "chest fluttering" in which she has at home but reports that it is worse within the last 30 minutes. Upon arrival into room, notes patient watching television, pleasant. Skin warm and dry. Apical pulse regular @ 73 bpm. Denies chest discomfort, SOB. 02 at 2 liters present. Unable to locate telemetry monitor at present. Continued inability to discharge  patient out of EPIC. Still pending solution via [facility] IS team. Notified Kenya CAMILO.      "

## 2024-03-10 NOTE — PLAN OF CARE
Problem: Adult Inpatient Plan of Care  Goal: Plan of Care Review  Outcome: Ongoing, Progressing  Flowsheets (Taken 3/10/2024 0912)  Plan of Care Reviewed With: patient  Goal: Patient-Specific Goal (Individualized)  Outcome: Ongoing, Progressing  Flowsheets (Taken 3/10/2024 0912)  Anxieties, Fears or Concerns: To gain greater mobility and independence in performing ADLs  Individualized Care Needs: To gain increased mobility within the next week

## 2024-03-10 NOTE — DISCHARGE SUMMARY
[facility] - Medical Surgical Unit  Discharge Note  Short Stay    * No surgery found *      OUTCOME:  Will admit to swing bed services for therapy    DISPOSITION: Swing Bed    FINAL DIAGNOSIS:  Dehydration    FOLLOWUP: In clinic    DISCHARGE INSTRUCTIONS:    Discharge Procedure Orders   Activity as tolerated         Clinical Reference Documents Added to Patient Instructions         Document    VERTEBRAL COMPRESSION FRACTURE DISCHARGE INSTRUCTIONS (ENGLISH)            TIME SPENT ON DISCHARGE: 30 minutes

## 2024-03-11 ENCOUNTER — HOSPITAL ENCOUNTER (INPATIENT)
Facility: HOSPITAL | Age: 79
LOS: 11 days | Discharge: HOME HEALTH | DRG: 561 | End: 2024-03-22
Attending: FAMILY MEDICINE | Admitting: HOSPITALIST
Payer: MEDICARE

## 2024-03-11 VITALS
HEART RATE: 75 BPM | OXYGEN SATURATION: 94 % | BODY MASS INDEX: 32.01 KG/M2 | DIASTOLIC BLOOD PRESSURE: 55 MMHG | SYSTOLIC BLOOD PRESSURE: 108 MMHG | TEMPERATURE: 98 F | HEIGHT: 65 IN | WEIGHT: 192.13 LBS | RESPIRATION RATE: 18 BRPM

## 2024-03-11 DIAGNOSIS — S22.000S THORACIC COMPRESSION FRACTURE, SEQUELA: ICD-10-CM

## 2024-03-11 DIAGNOSIS — R53.1 GENERALIZED WEAKNESS: ICD-10-CM

## 2024-03-11 DIAGNOSIS — K21.9 GASTROESOPHAGEAL REFLUX DISEASE, UNSPECIFIED WHETHER ESOPHAGITIS PRESENT: ICD-10-CM

## 2024-03-11 DIAGNOSIS — R63.0 POOR APPETITE: ICD-10-CM

## 2024-03-11 DIAGNOSIS — I10 PRIMARY HYPERTENSION: ICD-10-CM

## 2024-03-11 DIAGNOSIS — F32.A DEPRESSION, UNSPECIFIED DEPRESSION TYPE: ICD-10-CM

## 2024-03-11 DIAGNOSIS — M54.6 ACUTE MIDLINE THORACIC BACK PAIN: ICD-10-CM

## 2024-03-11 DIAGNOSIS — M54.2 NECK PAIN: Primary | ICD-10-CM

## 2024-03-11 DIAGNOSIS — Z74.09 IMPAIRED MOBILITY AND ADLS: ICD-10-CM

## 2024-03-11 DIAGNOSIS — E78.5 DYSLIPIDEMIA: ICD-10-CM

## 2024-03-11 DIAGNOSIS — S22.040D COMPRESSION FRACTURE OF T4 VERTEBRA WITH ROUTINE HEALING, SUBSEQUENT ENCOUNTER: ICD-10-CM

## 2024-03-11 DIAGNOSIS — Z78.9 IMPAIRED MOBILITY AND ADLS: ICD-10-CM

## 2024-03-11 LAB
ANION GAP SERPL CALCULATED.3IONS-SCNC: 11 MMOL/L (ref 7–16)
BASOPHILS # BLD AUTO: 0.02 K/UL (ref 0–0.2)
BASOPHILS NFR BLD AUTO: 0.3 % (ref 0–1)
BUN SERPL-MCNC: 14 MG/DL (ref 7–18)
BUN/CREAT SERPL: 21 (ref 6–20)
CALCIUM SERPL-MCNC: 9.2 MG/DL (ref 8.5–10.1)
CHLORIDE SERPL-SCNC: 98 MMOL/L (ref 98–107)
CO2 SERPL-SCNC: 31 MMOL/L (ref 21–32)
CREAT SERPL-MCNC: 0.66 MG/DL (ref 0.55–1.02)
DIFFERENTIAL METHOD BLD: ABNORMAL
EGFR (NO RACE VARIABLE) (RUSH/TITUS): 90 ML/MIN/1.73M2
EOSINOPHIL # BLD AUTO: 0.26 K/UL (ref 0–0.5)
EOSINOPHIL NFR BLD AUTO: 3.8 % (ref 1–4)
EOSINOPHIL NFR BLD MANUAL: 4 % (ref 1–4)
ERYTHROCYTE [DISTWIDTH] IN BLOOD BY AUTOMATED COUNT: 12.6 % (ref 11.5–14.5)
GLUCOSE SERPL-MCNC: 99 MG/DL (ref 74–106)
HCT VFR BLD AUTO: 28.2 % (ref 38–47)
HGB BLD-MCNC: 9.2 G/DL (ref 12–16)
HYPOCHROMIA BLD QL SMEAR: ABNORMAL
LYMPHOCYTES # BLD AUTO: 1 K/UL (ref 1–4.8)
LYMPHOCYTES NFR BLD AUTO: 14.4 % (ref 27–41)
LYMPHOCYTES NFR BLD MANUAL: 8 % (ref 27–41)
MCH RBC QN AUTO: 31.5 PG (ref 27–31)
MCHC RBC AUTO-ENTMCNC: 32.6 G/DL (ref 32–36)
MCV RBC AUTO: 96.6 FL (ref 80–96)
MONOCYTES # BLD AUTO: 0.69 K/UL (ref 0–0.8)
MONOCYTES NFR BLD AUTO: 10 % (ref 2–6)
MONOCYTES NFR BLD MANUAL: 10 % (ref 2–6)
MPC BLD CALC-MCNC: 9.2 FL (ref 9.4–12.4)
NEUTROPHILS # BLD AUTO: 4.96 K/UL (ref 1.8–7.7)
NEUTROPHILS NFR BLD AUTO: 71.5 % (ref 53–65)
NEUTS BAND NFR BLD MANUAL: 3 % (ref 1–5)
NEUTS SEG NFR BLD MANUAL: 75 % (ref 50–62)
NRBC BLD MANUAL-RTO: ABNORMAL %
PLATELET # BLD AUTO: 241 K/UL (ref 150–400)
PLATELET MORPHOLOGY: NORMAL
POTASSIUM SERPL-SCNC: 3.7 MMOL/L (ref 3.5–5.1)
RBC # BLD AUTO: 2.92 M/UL (ref 4.2–5.4)
SODIUM SERPL-SCNC: 136 MMOL/L (ref 136–145)
WBC # BLD AUTO: 6.93 K/UL (ref 4.5–11)

## 2024-03-11 PROCEDURE — 99900035 HC TECH TIME PER 15 MIN (STAT)

## 2024-03-11 PROCEDURE — 25000003 PHARM REV CODE 250

## 2024-03-11 PROCEDURE — 99239 HOSP IP/OBS DSCHRG MGMT >30: CPT | Mod: FS,,, | Performed by: FAMILY MEDICINE

## 2024-03-11 PROCEDURE — 85025 COMPLETE CBC W/AUTO DIFF WBC: CPT

## 2024-03-11 PROCEDURE — 25000242 PHARM REV CODE 250 ALT 637 W/ HCPCS

## 2024-03-11 PROCEDURE — 27000944

## 2024-03-11 PROCEDURE — 27000221 HC OXYGEN, UP TO 24 HOURS

## 2024-03-11 PROCEDURE — 94761 N-INVAS EAR/PLS OXIMETRY MLT: CPT

## 2024-03-11 PROCEDURE — 63600175 PHARM REV CODE 636 W HCPCS: Performed by: NURSE PRACTITIONER

## 2024-03-11 PROCEDURE — 11000004 HC SNF PRIVATE

## 2024-03-11 PROCEDURE — 25000003 PHARM REV CODE 250: Performed by: NURSE PRACTITIONER

## 2024-03-11 PROCEDURE — 27000982 HC MATTRESS, MATRIX LAL RENTAL

## 2024-03-11 PROCEDURE — 97162 PT EVAL MOD COMPLEX 30 MIN: CPT

## 2024-03-11 PROCEDURE — 80048 BASIC METABOLIC PNL TOTAL CA: CPT

## 2024-03-11 PROCEDURE — 94640 AIRWAY INHALATION TREATMENT: CPT

## 2024-03-11 RX ORDER — CALCIUM CARBONATE 200(500)MG
500 TABLET,CHEWABLE ORAL 2 TIMES DAILY PRN
Status: DISCONTINUED | OUTPATIENT
Start: 2024-03-11 | End: 2024-03-22 | Stop reason: HOSPADM

## 2024-03-11 RX ORDER — IBUPROFEN 200 MG
600 TABLET ORAL EVERY 6 HOURS PRN
Status: DISCONTINUED | OUTPATIENT
Start: 2024-03-11 | End: 2024-03-12

## 2024-03-11 RX ORDER — ONDANSETRON 4 MG/1
4 TABLET, ORALLY DISINTEGRATING ORAL EVERY 8 HOURS PRN
Status: DISCONTINUED | OUTPATIENT
Start: 2024-03-11 | End: 2024-03-22 | Stop reason: HOSPADM

## 2024-03-11 RX ORDER — ACETAMINOPHEN 325 MG/1
650 TABLET ORAL EVERY 6 HOURS PRN
Status: DISCONTINUED | OUTPATIENT
Start: 2024-03-11 | End: 2024-03-13

## 2024-03-11 RX ORDER — LIDOCAINE 50 MG/G
1 PATCH TOPICAL
Status: DISCONTINUED | OUTPATIENT
Start: 2024-03-11 | End: 2024-03-22 | Stop reason: HOSPADM

## 2024-03-11 RX ORDER — AMOXICILLIN 250 MG
1 CAPSULE ORAL 2 TIMES DAILY
Status: DISCONTINUED | OUTPATIENT
Start: 2024-03-11 | End: 2024-03-11 | Stop reason: SDUPTHER

## 2024-03-11 RX ORDER — ASPIRIN 325 MG
325 TABLET ORAL EVERY OTHER DAY
Status: DISCONTINUED | OUTPATIENT
Start: 2024-03-12 | End: 2024-03-12

## 2024-03-11 RX ORDER — CHLORTHALIDONE 25 MG/1
25 TABLET ORAL DAILY
Status: DISCONTINUED | OUTPATIENT
Start: 2024-03-12 | End: 2024-03-12

## 2024-03-11 RX ORDER — ATORVASTATIN CALCIUM 10 MG/1
20 TABLET, FILM COATED ORAL DAILY
Status: DISCONTINUED | OUTPATIENT
Start: 2024-03-12 | End: 2024-03-12

## 2024-03-11 RX ORDER — IPRATROPIUM BROMIDE AND ALBUTEROL SULFATE 2.5; .5 MG/3ML; MG/3ML
3 SOLUTION RESPIRATORY (INHALATION) EVERY 6 HOURS PRN
Status: DISCONTINUED | OUTPATIENT
Start: 2024-03-11 | End: 2024-03-22 | Stop reason: HOSPADM

## 2024-03-11 RX ORDER — SERTRALINE HYDROCHLORIDE 50 MG/1
100 TABLET, FILM COATED ORAL DAILY
Status: DISCONTINUED | OUTPATIENT
Start: 2024-03-12 | End: 2024-03-22 | Stop reason: HOSPADM

## 2024-03-11 RX ORDER — CALCIUM CARBONATE 200(500)MG
500 TABLET,CHEWABLE ORAL 2 TIMES DAILY PRN
Status: DISCONTINUED | OUTPATIENT
Start: 2024-03-11 | End: 2024-03-11 | Stop reason: SDUPTHER

## 2024-03-11 RX ORDER — CHOLECALCIFEROL (VITAMIN D3) 25 MCG
5000 TABLET ORAL DAILY
Status: DISCONTINUED | OUTPATIENT
Start: 2024-03-12 | End: 2024-03-11

## 2024-03-11 RX ORDER — ACETAMINOPHEN 325 MG/1
650 TABLET ORAL EVERY 8 HOURS PRN
Status: DISCONTINUED | OUTPATIENT
Start: 2024-03-11 | End: 2024-03-13

## 2024-03-11 RX ORDER — HYDROCORTISONE 25 MG/G
CREAM TOPICAL 2 TIMES DAILY
Status: DISCONTINUED | OUTPATIENT
Start: 2024-03-11 | End: 2024-03-13

## 2024-03-11 RX ORDER — TALC
6 POWDER (GRAM) TOPICAL NIGHTLY PRN
Status: DISCONTINUED | OUTPATIENT
Start: 2024-03-11 | End: 2024-03-22 | Stop reason: HOSPADM

## 2024-03-11 RX ORDER — CHOLECALCIFEROL (VITAMIN D3) 125 MCG
5000 CAPSULE ORAL DAILY
Status: DISCONTINUED | OUTPATIENT
Start: 2024-03-12 | End: 2024-03-22 | Stop reason: HOSPADM

## 2024-03-11 RX ORDER — METOPROLOL SUCCINATE 50 MG/1
50 TABLET, EXTENDED RELEASE ORAL 2 TIMES DAILY
Status: DISCONTINUED | OUTPATIENT
Start: 2024-03-11 | End: 2024-03-22 | Stop reason: HOSPADM

## 2024-03-11 RX ORDER — AMOXICILLIN 250 MG
1 CAPSULE ORAL 2 TIMES DAILY
Status: DISCONTINUED | OUTPATIENT
Start: 2024-03-11 | End: 2024-03-22 | Stop reason: HOSPADM

## 2024-03-11 RX ORDER — OXYBUTYNIN CHLORIDE 5 MG/1
10 TABLET, EXTENDED RELEASE ORAL 2 TIMES DAILY
Status: DISCONTINUED | OUTPATIENT
Start: 2024-03-11 | End: 2024-03-22 | Stop reason: HOSPADM

## 2024-03-11 RX ORDER — ENOXAPARIN SODIUM 100 MG/ML
40 INJECTION SUBCUTANEOUS EVERY 24 HOURS
Status: DISCONTINUED | OUTPATIENT
Start: 2024-03-11 | End: 2024-03-22 | Stop reason: HOSPADM

## 2024-03-11 RX ORDER — ALBUTEROL SULFATE 90 UG/1
2 AEROSOL, METERED RESPIRATORY (INHALATION) EVERY 6 HOURS PRN
Status: DISCONTINUED | OUTPATIENT
Start: 2024-03-11 | End: 2024-03-11

## 2024-03-11 RX ORDER — PANTOPRAZOLE SODIUM 40 MG/1
40 TABLET, DELAYED RELEASE ORAL DAILY
Status: DISCONTINUED | OUTPATIENT
Start: 2024-03-12 | End: 2024-03-22 | Stop reason: HOSPADM

## 2024-03-11 RX ORDER — FLUTICASONE PROPIONATE AND SALMETEROL 250; 50 UG/1; UG/1
1 POWDER RESPIRATORY (INHALATION) 2 TIMES DAILY
Status: DISCONTINUED | OUTPATIENT
Start: 2024-03-11 | End: 2024-03-11 | Stop reason: ALTCHOICE

## 2024-03-11 RX ADMIN — HYDROCORTISONE 2.5%: 25 CREAM TOPICAL at 09:03

## 2024-03-11 RX ADMIN — IBUPROFEN 600 MG: 200 TABLET, FILM COATED ORAL at 02:03

## 2024-03-11 RX ADMIN — POLYETHYLENE GLYCOL 3350 17 G: 17 POWDER, FOR SOLUTION ORAL at 08:03

## 2024-03-11 RX ADMIN — LIDOCAINE 1 PATCH: 50 PATCH CUTANEOUS at 02:03

## 2024-03-11 RX ADMIN — ENOXAPARIN SODIUM 40 MG: 40 INJECTION SUBCUTANEOUS at 05:03

## 2024-03-11 RX ADMIN — IBUPROFEN 600 MG: 200 TABLET, FILM COATED ORAL at 09:03

## 2024-03-11 RX ADMIN — ASPIRIN 325 MG ORAL TABLET 325 MG: 325 PILL ORAL at 08:03

## 2024-03-11 RX ADMIN — IPRATROPIUM BROMIDE AND ALBUTEROL SULFATE 3 ML: 2.5; .5 SOLUTION RESPIRATORY (INHALATION) at 07:03

## 2024-03-11 RX ADMIN — MELATONIN TAB 3 MG 6 MG: 3 TAB at 09:03

## 2024-03-11 RX ADMIN — ATORVASTATIN CALCIUM 20 MG: 10 TABLET, FILM COATED ORAL at 08:03

## 2024-03-11 RX ADMIN — SENNOSIDES AND DOCUSATE SODIUM 1 TABLET: 8.6; 5 TABLET ORAL at 09:03

## 2024-03-11 RX ADMIN — SERTRALINE HYDROCHLORIDE 100 MG: 50 TABLET ORAL at 08:03

## 2024-03-11 RX ADMIN — METOPROLOL SUCCINATE 50 MG: 50 TABLET, EXTENDED RELEASE ORAL at 09:03

## 2024-03-11 RX ADMIN — PANTOPRAZOLE SODIUM 40 MG: 40 TABLET, DELAYED RELEASE ORAL at 08:03

## 2024-03-11 RX ADMIN — IBUPROFEN 600 MG: 200 TABLET, FILM COATED ORAL at 06:03

## 2024-03-11 RX ADMIN — OXYBUTYNIN CHLORIDE 10 MG: 5 TABLET, EXTENDED RELEASE ORAL at 09:03

## 2024-03-11 RX ADMIN — METOPROLOL SUCCINATE 50 MG: 50 TABLET, FILM COATED, EXTENDED RELEASE ORAL at 08:03

## 2024-03-11 NOTE — ASSESSMENT & PLAN NOTE
03/08/24   Compression fracture t4 - age indeterminate   TLSO brace  PT and OT evaluation done  Plan is to dc to swing bed tomorrow- continue therapy for strengthening    03/11/24 dc to swing bed to continue therapy  TLSO brace while out of bed

## 2024-03-11 NOTE — ASSESSMENT & PLAN NOTE
03/11/24 T 4 compression fracture  TLSO brace while out of bed  PT and OT to evaluate and treat

## 2024-03-11 NOTE — PLAN OF CARE
Problem: Physical Therapy  Goal: Physical Therapy Goal  Description: Short-term Goals: 1.5 weeks  1. Patient will perform supine to/from sit with minimal assistance x 2 people to improve independence and safety with bed mobility.  2. Patient will perform sit to/from stand with a rolling walker with minimal assistance to improve independence and safety with transfers.  3. Patient will ambulate 150 feet with a rolling walker with contact guard assist to improve independence and safety with gait.  4. Patient will tolerate 15 minutes of physical therapy intervention to improve endurance and activity tolerance.    Long-term goals: 3 weeks  1. Patient will perform supine to/from sit with minimal assistance to improve independence and safety with bed mobility.  2. Patient will perform sit to/from stand with a rolling walker with contact guard assist to improve independence and safety with transfers.  3. Patient will ambulate 300 feet with a rolling walker with standby assist to improve independence and safety with gait.  4. Patient will tolerate 30 minutes of physical therapy intervention to improve endurance and activity tolerance.    Outcome: Ongoing, Progressing

## 2024-03-11 NOTE — ASSESSMENT & PLAN NOTE
03/11/24   TLSO brace while out of bed  Ibuprofen and lidoderm patch for pain relief   Fall precautions

## 2024-03-11 NOTE — DISCHARGE SUMMARY
[facility] - Medical Surgical Unit  Hospital Medicine  Discharge Summary      Patient Name: Roselia Eldridge  MRN: [id number]  [facility]: [id number]  Patient Class: IP- Inpatient  Admission Date: 3/6/2024  Hospital Length of Stay: 3 days  Discharge Date and Time:  03/11/2024 11:47 AM  Attending Physician: No att. providers found   Discharging Provider: LESLEE Yoo  Primary Care Provider: Pamela Jurado DO    Primary Care Team: Networked reference to record PCT     HPI:   Patient presents for evaluation of back pain. She has a known T4 compression fracture but also reports right rib pain after an incident tonight where she felt her knees lock up and eased herself down striking her ribs on a box on the floor. She denies any LOC or head injury during this event or the prior event. She was alert and oriented x4. GCS 15. She reports that she has been barely mobile since her diagnosis of the compression fracture in her back. Also reports decreased intake and inability to care for herself at home. She did receive 100 mcg of fentanyl prior to arrival for pain control and reports mild improvement. She was not received in a TLSO brace. We will obtain lab specimens for further evaluation and perform a chest x-ray to rule out any underlying bony abnormality related to her recent rib injury. We will provide her rehydration with normal saline 1 L bolus IV. She did also have some wheezing noted on exam and reports a prior history of asthma. We will provide her with DuoNeb nebulizer treatment. We will also provide her with a TLSO brace for comfort and pain control. Labs were reviewed and showed a potassium at 3.0. We will provide her with 40 mEq of potassium p.o.. She also reports continued pain despite the fentanyl prior to arrival so we will attempt hydrocodone 5 mg p.o. as she was previously prescribed but unable to obtain. Workup does reveal an elevated white blood cell count but chest x-ray and urine sample  showed no signs of infection. She does appear to be dehydrated due to her decreased oral intake since the initial injury. We did discuss the case with Dr. Myers who is agreed to admit the patient at [facility] for impaired mobility in ADLs, intractable pain, hypokalemia, and dehydration.       Above is ER record from 03/06/24.     Mrs. Eldridge is resting in bed this morning. She is awake, alert and oriented. Complains of reflux this morning. States she needs her protonix- already ordered. Complains of thoracic back pain. Has TLSO brace on. States she wants her muscle relaxer and norco. States norco 5 is not doing anything for her pain. Dc'd norco 5 and baclofen. Increased norco to every 6 hours prn pain. Potassium 3.2- will replace orally.   She states she is not interested in swingbed for rehab. States she wants to discharge home. Talked with her re home environment. States she lives alone but she does have neighbor who can help her if needed. Will have PT screening. She is interested in home health with PT and OT at discharge.   Low grade temp of 100 this morning. Swab for influenza and covid negative. Sat 94% on one liter.       * No surgery found *      Hospital Course:   03/07/2024-mild improvement in prerenal azotemia.  Still likely intravascular volume depletion and recommend continuing fluids.  Replete electrolytes as indicated.    1300- went to check on Mrs. Eldridge. She has no complaints. She is looking at ceiling stating that her mastercard statement is up there. Oriented to person, place and time. Consulted with Dr. De Jesus re visual hallucinations. Will dc norco and add lidoderm patch and naprosyn.      03/08/24 Resting in bed with HOB flat. Complains of reflux. Encouraged her to allow HOB to be raised.  States back pain is better with lidoderm patch . Reports level 6 when she moves. She is oriented x 3 but continues to complain of having visual hallucinations. States she is seeing little black girls  "dancing on the ceiling. WBC 10.92 . She is afebrile. Last dose of norco was around 1100 on 03/07/24. Has TLSO brace on due to compression fracture seen on CT 03/04. She is very worried about her neighbor feeding her dogs. Nurses have contacted neighbor per her request re this. Talked with Mrs. Eldridge re needing swing bed due to the fact that she lives alone and she is also having delerium. Will place PT and OT evaluations. She is agreeable to swing bed. Probable dc from acute care to swing bed tomorrow.      Talked with her re ibuprofen since it is flagging as an allergy - states she can and does take this without any issues.    03/9/24 Patient awake in bed, eating breakfast, reports she feels better today. Patient reports she feels like her appetite is improving, states she has not seen any more cars or animals in the trees since yesterday. Patient states her pain has been well controlled and she is looking forward to working PT so she can get back home. Patient will be moved to swing bed status after her 3 midnight stays which will be tomorrow. Dr. Guerra was available for rounds this morning.     3/10/24 Patient has had 3 midnight stays.  She will now be admitted into our swing bed to work with PT/OT therapies.  I discussed this with the patient and she has agreed to work with therapy.       03/11/24 Awake and alert, resting in bed. Complains that nurses sre wanting to dry her and she is not ready to do so yet. Encouraged her to let nurses clean her up , get urine off of her skin so she will not have skin  break down. States " I am not a child , it willnot be on my skin that long." States she has not had any further hallucinations since Saturday. States ibuprofen is working well for back pain. Agreeable to swing bed for continued therapy. Discharged to swing bed from acute care.     Discharge was significantly affected by electronic health record.  Although patient was supposed to discharge on Saturday there were " technical errors which led to patient remaining inpatient until today.  Patient has reached maximal benefit from hospitalization as an inpatient we will be discharged to swing bed services.      Goals of Care Treatment Preferences:  Code Status: DNR      Consults:   Consults (From admission, onward)          Status Ordering Provider     Inpatient consult to Social Work  Once        Provider:  (Not yet assigned)    Acknowledged LATRICE MCCABE            No new Assessment & Plan notes have been filed under this hospital service since the last note was generated.  Service: Hospital Medicine    Final Active Diagnoses:    Diagnosis Date Noted POA    Thoracic compression fracture [S22.000A] 03/07/2024 Yes    Impaired mobility and ADLs [Z74.09, Z78.9] 03/07/2024 Yes    Acute midline back pain [M54.9] 03/07/2024 Yes    GERD (gastroesophageal reflux disease) [K21.9] 03/07/2024 Yes    Poor appetite [R63.0] 03/08/2024 Yes    Dyslipidemia [E78.5] 03/07/2024 Yes      Problems Resolved During this Admission:    Diagnosis Date Noted Date Resolved POA    PRINCIPAL PROBLEM:  Dehydration [E86.0] 03/07/2024 03/09/2024 Yes    Delirium [R41.0] 03/08/2024 03/08/2024 No    Visual hallucinations [R44.1] 03/08/2024 03/10/2024 No    Frequent falls [R29.6] 03/08/2024 03/09/2024 Not Applicable    Constipation [K59.00] 03/07/2024 03/10/2024 Yes    Palpitations [R00.2] 03/07/2024 03/10/2024 Yes       Discharged Condition: stable    Disposition: Another Health Care Inst*    Follow Up:   Follow-up Information       [facility] - Emergency Department .    Specialty: Emergency Medicine  Contact information:  [address] [facility] [address] [phone]             Pamela Jurado, DO Follow up in 1 week(s).    Specialty: Family Medicine  Contact information:  [address]  [address] [address] [phone]                           Patient Instructions:      Diet  Cardiac     Activity as tolerated       Significant Diagnostic Studies: Labs: All labs within the past 24 hours have been reviewed    Pending Diagnostic Studies:       None           Medications:  Reconciled Home Medications:      Medication List        CONTINUE taking these medications      albuterol 90 mcg/actuation inhaler  Commonly known as: PROVENTIL/VENTOLIN HFA  Inhale 2 puffs into the lungs every 6 (six) hours as needed.     aspirin 325 MG tablet  Take 325 mg by mouth every other day.     baclofen 10 MG tablet  Commonly known as: LIORESAL  Take 10 mg by mouth 3 (three) times daily.     chlorthalidone 25 MG Tab  Commonly known as: HYGROTEN  Take 25 mg by mouth.     fluticasone-salmeterol 250-50 mcg/dose 250-50 mcg/dose diskus inhaler  Commonly known as: ADVAIR  Inhale 1 puff into the lungs.     HYDROcodone-acetaminophen 5-325 mg per tablet  Commonly known as: NORCO  Take 1 tablet by mouth every 4 (four) hours as needed for Pain.     metoprolol succinate 50 MG 24 hr tablet  Commonly known as: TOPROL-XL  Take 1 tablet by mouth 2 (two) times daily.     omeprazole 20 MG capsule  Commonly known as: PRILOSEC  Take 20 mg by mouth once daily.     oxybutynin 10 MG 24 hr tablet  Commonly known as: DITROPAN-XL  Take 1 tablet by mouth 2 (two) times daily.     sertraline 100 MG tablet  Commonly known as: ZOLOFT  Take 100 mg by mouth.     simvastatin 40 MG tablet  Commonly known as: ZOCOR  Take 1 tablet by mouth every evening.     traMADoL 50 mg tablet  Commonly known as: ULTRAM  Take 50 mg by mouth every 6 (six) hours as needed.     triamcinolone acetonide 0.1% 0.1 % cream  Commonly known as: KENALOG  Apply topically.     vitamin D 1000 units Tab  Commonly known as: VITAMIN D3  Take 5,000 Units by mouth.              Indwelling Lines/Drains at time of discharge:   Lines/Drains/Airways       None                   Time spent on the discharge of patient: 35 minutes         Galindo De Jesus IV, DO  [facility]  Medicine  [facility] - Medical Surgical Unit

## 2024-03-11 NOTE — SUBJECTIVE & OBJECTIVE
Past Medical History:   Diagnosis Date    Asthma     Hypertension     Mixed hyperlipidemia        Past Surgical History:   Procedure Laterality Date    COSMETIC SURGERY         Review of patient's allergies indicates:   Allergen Reactions    Norco [hydrocodone-acetaminophen] Hallucinations    Pentazocine Nausea Only    Talwin compound Nausea And Vomiting       Current Facility-Administered Medications on File Prior to Encounter   Medication    acetaminophen tablet 650 mg    albuterol-ipratropium 2.5 mg-0.5 mg/3 mL nebulizer solution 3 mL    aspirin tablet 325 mg    atorvastatin tablet 20 mg    bisacodyL EC tablet 10 mg    calcium carbonate 200 mg calcium (500 mg) chewable tablet 500 mg    enoxaparin injection 40 mg    hydrocortisone 2.5 % rectal cream    ibuprofen tablet 600 mg    LIDOcaine 5 % patch 1 patch    melatonin tablet 6 mg    metoprolol succinate (TOPROL-XL) 24 hr tablet 50 mg    ondansetron injection 4 mg    pantoprazole EC tablet 40 mg    polyethylene glycol packet 17 g    potassium bicarbonate disintegrating tablet 35 mEq    potassium bicarbonate disintegrating tablet 60 mEq    senna-docusate 8.6-50 mg per tablet 1 tablet    sertraline tablet 100 mg    sodium chloride 0.9% flush 10 mL     Current Outpatient Medications on File Prior to Encounter   Medication Sig    albuterol (PROVENTIL/VENTOLIN HFA) 90 mcg/actuation inhaler Inhale 2 puffs into the lungs every 6 (six) hours as needed.    aspirin 325 MG tablet Take 325 mg by mouth every other day.    baclofen (LIORESAL) 10 MG tablet Take 10 mg by mouth 3 (three) times daily.    chlorthalidone (HYGROTEN) 25 MG Tab Take 25 mg by mouth.    fluticasone-salmeterol diskus inhaler 250-50 mcg Inhale 1 puff into the lungs.    HYDROcodone-acetaminophen (NORCO) 5-325 mg per tablet Take 1 tablet by mouth every 4 (four) hours as needed for Pain.    metoprolol succinate (TOPROL-XL) 50 MG 24 hr tablet Take 1 tablet by mouth 2 (two) times daily.    omeprazole (PRILOSEC)  20 MG capsule Take 20 mg by mouth once daily.    oxybutynin (DITROPAN-XL) 10 MG 24 hr tablet Take 1 tablet by mouth 2 (two) times daily.    sertraline (ZOLOFT) 100 MG tablet Take 100 mg by mouth.    simvastatin (ZOCOR) 40 MG tablet Take 1 tablet by mouth every evening.    traMADoL (ULTRAM) 50 mg tablet Take 50 mg by mouth every 6 (six) hours as needed.    triamcinolone acetonide 0.1% (KENALOG) 0.1 % cream Apply topically.    vitamin D (VITAMIN D3) 1000 units Tab Take 5,000 Units by mouth.     Family History    None       Tobacco Use    Smoking status: Never    Smokeless tobacco: Never   Substance and Sexual Activity    Alcohol use: Yes     Comment: daily    Drug use: Never    Sexual activity: Not on file     Review of Systems   Constitutional:  Negative for appetite change and fatigue.   HENT:  Negative for sinus pressure, sinus pain and trouble swallowing.    Eyes:  Negative for discharge and itching.   Respiratory:  Negative for cough.    Cardiovascular:  Negative for chest pain and palpitations.        Reports history of palpitations   Gastrointestinal:  Negative for constipation, diarrhea, nausea and vomiting.   Genitourinary:  Negative for difficulty urinating and dysuria.        OAB    Musculoskeletal:  Positive for arthralgias and back pain. Negative for joint swelling.        States has hx of DDD to neck - states pain to neck is at level 3-4 this morning. States she usually has cervical pillow for rest   Skin:  Negative for color change.   Neurological:  Positive for weakness. Negative for light-headedness and headaches.   Psychiatric/Behavioral:          History of major depression   On sertraline      Objective:     Vital Signs (Most Recent):    Vital Signs (24h Range):  Temp:  [97.8 °F (36.6 °C)-98.7 °F (37.1 °C)] 98.7 °F (37.1 °C)  Pulse:  [62-82] 82  Resp:  [18-20] 18  SpO2:  [94 %-98 %] 96 %  BP: ()/(54-86) 128/74        There is no height or weight on file to calculate BMI.     Physical  "Exam  HENT:      Head: Normocephalic.      Nose: Nose normal.      Mouth/Throat:      Mouth: Mucous membranes are moist.   Eyes:      Pupils: Pupils are equal, round, and reactive to light.   Cardiovascular:      Rate and Rhythm: Normal rate and regular rhythm.      Pulses: Normal pulses.      Heart sounds: Normal heart sounds.   Pulmonary:      Effort: Pulmonary effort is normal. No respiratory distress.      Breath sounds: Normal breath sounds. No stridor. No wheezing or rhonchi.   Abdominal:      General: Bowel sounds are normal. There is no distension.      Palpations: Abdomen is soft. There is no mass.      Tenderness: There is no abdominal tenderness. There is no guarding or rebound.      Hernia: No hernia is present.   Musculoskeletal:         General: No swelling, tenderness or signs of injury.      Right lower leg: No edema.      Left lower leg: No edema.      Comments: Complain sof pain with movement- has t4 compression fracture   TLSO brace while out of bed    Skin:     General: Skin is warm and dry.   Neurological:      Mental Status: She is alert and oriented to person, place, and time.      Motor: Weakness present.   Psychiatric:      Comments: Seems mildly agitated - she is wey and will not allow nurses to change her - encouraged to let then get urine off her skin so skin will not become irritated- states " I am not a child, it wont be on me that long. 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       Significant Labs: All pertinent labs within the past 24 hours have been reviewed.  Recent Lab Results         03/11/24  0605        Anion Gap 11       Bands 3       Baso # 0.02       Basophil % 0.3       BUN 14       BUN/CREAT RATIO 21       Calcium 9.2       Chloride 98       CO2 31       Creatinine 0.66       Differential Method Manual       eGFR 90       Eos # 0.26       Eos % 3.8        4       Glucose 99       Hematocrit " 28.2       Hemoglobin 9.2       Hypo Few       Lymph # 1.00       Lymph % 14.4        8       MCH 31.5       MCHC 32.6       MCV 96.6       Mono # 0.69       Mono % 10.0        10       MPV 9.2       Neutrophils, Abs 4.96       Neutrophils Relative 71.5       nRBC       PLATELET MORPHOLOGY Normal       Platelet Count 241       Potassium 3.7       RBC 2.92       RDW 12.6       Segmented Neutrophils, Man % 75       Sodium 136       WBC 6.93               Significant Imaging: I have reviewed all pertinent imaging results/findings within the past 24 hours.

## 2024-03-11 NOTE — HPI
Roselia Eldridge is a 78 year old female with pmh cervical spondylosis, primary hypertension, OAB, mixed hyperlipidemia, depression, insomnia, GERD, CKD, CAD and vitamin D deficiency.   She was admitted to inpatient on 03/06/24 for thoracic pain , right rib pain after fall at home and dehydration. She was given IV fluids and norco for pain. She began to have visual hallucinations after norco. Norco was dcd and back pain was treated with lidoderm patch and ibuprofen.  Denies any hallucinations since Saturday. She continues to have impaired mobility due to T4 compression fracture. She has TLSO brace that she wears while out of bed. States ibuprofen is working well for her back pain. She is agreeable with swing bed for continued therapy. She is wet this morning, hesitant to let nurses clean her- encouraged her to let nurses/ CNAs clean her so her skin will not become irritated and break down- states she is not a child, she will not lay in it that long. She is alert and oriented x 3.

## 2024-03-11 NOTE — PLAN OF CARE
[facility] - Medical Surgical Unit  Discharge Final Note    Primary Care Provider: Pamela Jurado DO    Expected Discharge Date: 3/11/2024    Final Discharge Note (most recent)       Final Note - 03/11/24 1041          Final Note    Assessment Type Final Discharge Note (P)      Anticipated Discharge Disposition Swing Bed (P)      Hospital Resources/Appts/Education Provided Provided patient/caregiver with written discharge plan information;Provided education on problems/symptoms using teachback;Appointments scheduled and added to AVS (P)         Post-Acute Status    Coverage Medicare A & B (P)      Patient choice form signed by patient/caregiver List with quality metrics by geographic area provided;List from CMS Compare (P)      Discharge Delays None known at this time (P)                      Important Message from Medicare  Important Message from Medicare regarding Discharge Appeal Rights: Given to patient/caregiver, Explained to patient/caregiver, Signed/date by patient/caregiver     Date IMM was signed: 03/11/2024  Time IMM was signed: 0745    Contact Info       [facility] - Emergency Department   Specialty: Emergency Medicine    [address] [facility] [place] [address]   Phone: [phone]       Next Steps: Follow up    Pamela Jurado DO   Specialty: Family Medicine   Relationship: PCP - General    [facility]  [address] Family Medicine  [facility] [address]   Phone: [phone]       Next Steps: Follow up in 1 week(s)          Ms. Eldridge will discharge from inpatient status to swing bed status today

## 2024-03-11 NOTE — PLAN OF CARE
Problem: Fall Injury Risk  Goal: Absence of Fall and Fall-Related Injury  Outcome: Ongoing, Progressing  Intervention: Promote Injury-Free Environment  Flowsheets (Taken 3/11/2024 7270)  Safety Promotion/Fall Prevention:   assistive device/personal item within reach   bed alarm set   chair alarm set   nonskid shoes/socks when out of bed   room near unit station   side rails raised x 3   instructed to call staff for mobility

## 2024-03-11 NOTE — ASSESSMENT & PLAN NOTE
Chronic, controlled. Latest blood pressure and vitals reviewed-     Temp:  [97.8 °F (36.6 °C)-98.7 °F (37.1 °C)]   Pulse:  [62-82]   Resp:  [18-20]   BP: ()/(54-86)   SpO2:  [94 %-98 %] .   Home meds for hypertension were reviewed and noted below.   Hypertension Medications               chlorthalidone (HYGROTEN) 25 MG Tab Take 25 mg by mouth.    metoprolol succinate (TOPROL-XL) 50 MG 24 hr tablet Take 1 tablet by mouth 2 (two) times daily.            While in the hospital, will manage blood pressure as follows; Continue home antihypertensive regimen    Will utilize p.r.n. blood pressure medication only if patient's blood pressure greater than 180/110 and she develops symptoms such as worsening chest pain or shortness of breath.

## 2024-03-11 NOTE — PT/OT/SLP EVAL
"Physical Therapy Evaluation    Patient Name:  Roselia Eldridge   MRN:  [id number]    Recommendations:     Discharge Recommendations: Low Intensity Therapy   Discharge Equipment Recommendations: walker, rolling   Barriers to discharge: Inaccessible home and Decreased caregiver support    Assessment:     Roselia Eldridge is a 78 y.o. female admitted with a medical diagnosis of Generalized weakness. Patient presented to the emergency department with dehydration and back pain following a lowered fall to the floor hitting her ribs on a box on the way down. Patient with a known T4 compression fracture for which she wears a TLSO when out of bed. She presents with the following impairments/functional limitations: weakness, impaired endurance, impaired self care skills, impaired functional mobility, gait instability, impaired balance, decreased lower extremity function, decreased safety awareness, pain. Patient complained of right cervical pain in all positions (supine, sitting, standing, and reclined in chair). Patient told Physical Therapist, "You must be from outer space for making me do this while I am in so much pain." Patient's cognitive status and intrinsic motivation to improve will be her biggest limiting factors related to discharge planning. Patient assisted to the bedside commode with the assist of Physical Therapist and CHRISTAL Carbone. CNA assisted patient with hygiene following.     Rehab Prognosis: Fair; patient would benefit from acute skilled PT services to address these deficits and reach maximum level of function.    Recent Surgery: * No surgery found *      Plan:     During this hospitalization, patient to be seen 5 x/week to address the identified rehab impairments via gait training, therapeutic activities, therapeutic exercises, neuromuscular re-education and progress toward the following goals:    Plan of Care Expires:  03/28/24    Subjective     Chief Complaint: right cervical pain and needing to get on a bed " núñez  Patient/Family Comments/goals: Patient's goal is to return home with her 3 dogs when medically/functionally able.   Pain/Comfort:  Pain Rating 1: 10/10  Location - Side 1: Right  Location 1: cervical spine  Pain Addressed 1: Reposition, Distraction, Other (see comments), Nurse notified (functional mobility)  Pain Rating Post-Intervention 1: 10/10    Patients cultural, spiritual, Worship conflicts given the current situation: no    Living Environment: Patient lives alone in a single story home. Patient has 3 dogs that she enjoys caring for. Prior to admission, patients level of function was independent vs modified independent with the use of a rollator vs single point cane.  Equipment used at home: rollator, cane, straight.  DME owned (not currently used): none. Upon discharge, patient will have assistance from no one consistently.    Objective:     Communicated with LESLEE Caro prior to session. Patient found cierra strickland in bed with her body positioned towards the foot of the bed reporting that she needs to get on the bed pan but that she dropped her call bell and has been unable to call for assistance upon PT entry to room.    General Precautions: Standard, fall  Orthopedic Precautions:    Braces: TLSO (due to T4 compression fracture)  Respiratory Status: Nasal cannula    Exams:  Gross Motor Coordination:  WFL  Sensation:    -       Intact  RLE ROM: WFL  RLE Strength: Deficits: 3-/5  LLE ROM: WFL  LLE Strength: Deficits: 3-/5    Functional Mobility:  Bed Mobility:     Scooting: minimum assistance  Supine to Sit: moderate assistance and of 2 persons  Transfers:     Sit to Stand:  minimum assistance and of 2 persons with rolling walker  Toilet Transfer: minimum assistance and of 2 persons with  rolling walker  using  Step Transfer  Gait: x 48 feet with rolling walker with minimal assist; decreased step lengths; generally unsteady; reports of having hypoglycemia and potentially needing to sit  quickly; verbal cues to increase gait distance due to waking to sit after 8 feet  Balance: contact guard assist vs minimal assist with static sitting balance on edge of bed with a tendency to lean towards the left; minimal assist with static and dynamic standing balance with rolling walker    AM-PAC 6 CLICK MOBILITY  Total Score:15     Treatment & Education:    Bed mobility, transfers, and gait performed as listed above. Patient educated on the physical therapy plan of care.     Patient left up in chair with chair alarm on, JOSE JUAN Miller notified, and NIA Morelos present.    GOALS:   Multidisciplinary Problems       Physical Therapy Goals          Problem: Physical Therapy    Goal Priority Disciplines Outcome Goal Variances Interventions   Physical Therapy Goal     PT, PT/OT Ongoing, Progressing     Description: Short-term Goals: 1.5 weeks  1. Patient will perform supine to/from sit with minimal assistance x 2 people to improve independence and safety with bed mobility.  2. Patient will perform sit to/from stand with a rolling walker with minimal assistance to improve independence and safety with transfers.  3. Patient will ambulate 150 feet with a rolling walker with contact guard assist to improve independence and safety with gait.  4. Patient will tolerate 15 minutes of physical therapy intervention to improve endurance and activity tolerance.    Long-term goals: 3 weeks  1. Patient will perform supine to/from sit with minimal assistance to improve independence and safety with bed mobility.  2. Patient will perform sit to/from stand with a rolling walker with contact guard assist to improve independence and safety with transfers.  3. Patient will ambulate 300 feet with a rolling walker with standby assist to improve independence and safety with gait.  4. Patient will tolerate 30 minutes of physical therapy intervention to improve endurance and activity tolerance.                       History:     Past Surgical History:    Procedure Laterality Date    COSMETIC SURGERY         Time Tracking:     PT Received On: 03/11/24  PT Start Time: 1334     PT Stop Time: 1403  PT Total Time (min): 29 min     Billable Minutes: Evaluation (moderate complexity; 29 minutes)      03/11/2024

## 2024-03-11 NOTE — PLAN OF CARE
Problem: Physical Therapy  Goal: Physical Therapy Goal  Description: Short-term Goals: 1.5 weeks  1. Patient will perform supine to/from sit with minimal assistance x 2 people to improve independence and safety with bed mobility.  2. Patient will perform sit to/from stand with a rolling walker with minimal assistance to improve independence and safety with transfers.  3. Patient will ambulate 50 feet with a rolling walker with minimal assistance to improve independence and safety with gait.  4. Patient will tolerate 15 minutes of physical therapy intervention to improve endurance and activity tolerance.    Long-term goals: 3 weeks  1. Patient will perform supine to/from sit with minimal assistance to improve independence and safety with bed mobility.  2. Patient will perform sit to/from stand with a rolling walker with contact guard assist to improve independence and safety with transfers.  3. Patient will ambulate 100 feet with a rolling walker with contact guard assist to improve independence and safety with gait.  4. Patient will tolerate 30 minutes of physical therapy intervention to improve endurance and activity tolerance.    Outcome: Met

## 2024-03-11 NOTE — ASSESSMENT & PLAN NOTE
CT shows questionable mild age-indeterminate compression fracture of T4, correlate with point tenderness.   TLSO brace on - norco for pain relief        Dcd norco - due to hallucinations after taking this     03/08/24 Lidoderm patch and tylenol for pain relief  PT and OT to evaluate and treat       03/11/24 pain controlled with lidoderm patch and ibuprofen

## 2024-03-11 NOTE — PROGRESS NOTES
[facility] - Medical Surgical Unit  Hospital Medicine  Progress Note    Patient Name: Roselia Eldridge  MRN: [id number]  Patient Class: IP- Inpatient   Admission Date: 3/6/2024  Length of Stay: 3 days  Attending Physician: Galindo De Jesus IV, DO  Primary Care Provider: Pamela Jurado DO        Subjective:     Principal Problem:Dehydration        HPI:  Patient presents for evaluation of back pain. She has a known T4 compression fracture but also reports right rib pain after an incident tonight where she felt her knees lock up and eased herself down striking her ribs on a box on the floor. She denies any LOC or head injury during this event or the prior event. She was alert and oriented x4. GCS 15. She reports that she has been barely mobile since her diagnosis of the compression fracture in her back. Also reports decreased intake and inability to care for herself at home. She did receive 100 mcg of fentanyl prior to arrival for pain control and reports mild improvement. She was not received in a TLSO brace. We will obtain lab specimens for further evaluation and perform a chest x-ray to rule out any underlying bony abnormality related to her recent rib injury. We will provide her rehydration with normal saline 1 L bolus IV. She did also have some wheezing noted on exam and reports a prior history of asthma. We will provide her with DuoNeb nebulizer treatment. We will also provide her with a TLSO brace for comfort and pain control. Labs were reviewed and showed a potassium at 3.0. We will provide her with 40 mEq of potassium p.o.. She also reports continued pain despite the fentanyl prior to arrival so we will attempt hydrocodone 5 mg p.o. as she was previously prescribed but unable to obtain. Workup does reveal an elevated white blood cell count but chest x-ray and urine sample showed no signs of infection. She does appear to be dehydrated due to her decreased oral intake since the initial injury. We did  discuss the case with Dr. Myers who is agreed to admit the patient at [facility] for impaired mobility in ADLs, intractable pain, hypokalemia, and dehydration.       Above is ER record from 03/06/24.     Mrs. Eldridge is resting in bed this morning. She is awake, alert and oriented. Complains of reflux this morning. States she needs her protonix- already ordered. Complains of thoracic back pain. Has TLSO brace on. States she wants her muscle relaxer and norco. States norco 5 is not doing anything for her pain. Dc'd norco 5 and baclofen. Increased norco to every 6 hours prn pain. Potassium 3.2- will replace orally.   She states she is not interested in swingbed for rehab. States she wants to discharge home. Talked with her re home environment. States she lives alone but she does have neighbor who can help her if needed. Will have PT screening. She is interested in home health with PT and OT at discharge.   Low grade temp of 100 this morning. Swab for influenza and covid negative. Sat 94% on one liter.       Overview/Hospital Course:  03/07/2024-mild improvement in prerenal azotemia.  Still likely intravascular volume depletion and recommend continuing fluids.  Replete electrolytes as indicated.    1300- went to check on Mrs. Eldridge. She has no complaints. She is looking at ceiling stating that her mastercard statement is up there. Oriented to person, place and time. Consulted with Dr. Neal novak visual hallucinations. Will dc norco and add lidoderm patch and naprosyn.      03/08/24 Resting in bed with HOB flat. Complains of reflux. Encouraged her to allow HOB to be raised.  States back pain is better with lidoderm patch . Reports level 6 when she moves. She is oriented x 3 but continues to complain of having visual hallucinations. States she is seeing little black girls dancing on the ceiling. WBC 10.92 . She is afebrile. Last dose of norco was around 1100 on 03/07/24. Has TLSO brace on due to compression fracture  "seen on CT 03/04. She is very worried about her neighbor feeding her dogs. Nurses have contacted neighbor per her request re this. Talked with Mrs. Eldridge re needing swing bed due to the fact that she lives alone and she is also having delerium. Will place PT and OT evaluations. She is agreeable to swing bed. Probable dc from acute care to swing bed tomorrow.      Talked with her re ibuprofen since it is flagging as an allergy - states she can and does take this without any issues.    03/9/24 Patient awake in bed, eating breakfast, reports she feels better today. Patient reports she feels like her appetite is improving, states she has not seen any more cars or animals in the trees since yesterday. Patient states her pain has been well controlled and she is looking forward to working PT so she can get back home. Patient will be moved to swing bed status after her 3 midnight stays which will be tomorrow. Dr. Guerra was available for rounds this morning.     3/10/24 Patient has had 3 midnight stays.  She will now be admitted into our swing bed to work with PT/OT therapies.  I discussed this with the patient and she has agreed to work with therapy.       03/11/24 Awake and alert, resting in bed. Complains that nurses sre wanting to dry her and she is not ready to do so yet. Encouraged her to let nurses clean her up , get urine off of her skin so she will not have skin  break down. States " I am not a child , it willnot be on my skin that long." States she has not had any further hallucinations since Saturday. States ibuprofen is working well for back pain. Agreeable to swing bed for continued therapy. Discharged to swing bed from acute care.    Interval History:back pain, delerium    Review of Systems   Constitutional:  Negative for fatigue.   HENT:  Negative for sinus pressure, sinus pain, sore throat and trouble swallowing.    Eyes:  Negative for discharge and itching.   Respiratory:  Negative for cough and shortness " of breath.    Cardiovascular:  Negative for chest pain, palpitations and leg swelling.   Gastrointestinal:  Negative for abdominal distention, abdominal pain, constipation, diarrhea, nausea and vomiting.   Genitourinary:  Negative for dysuria.        Reports having occasional urinary incontinence   Musculoskeletal:  Positive for arthralgias and back pain. Negative for neck pain and neck stiffness.        Thoracic back pain    Skin:  Negative for color change, pallor, rash and wound.   Neurological:  Positive for weakness. Negative for light-headedness and headaches.     Objective:     Vital Signs (Most Recent):  Temp: 98.7 °F (37.1 °C) (03/11/24 0716)  Pulse: 82 (03/11/24 0737)  Resp: 18 (03/11/24 0737)  BP: 128/74 (03/11/24 0716)  SpO2: 96 % (03/11/24 0737) Vital Signs (24h Range):  Temp:  [97.8 °F (36.6 °C)-98.7 °F (37.1 °C)] 98.7 °F (37.1 °C)  Pulse:  [62-82] 82  Resp:  [18-20] 18  SpO2:  [94 %-98 %] 96 %  BP: ()/(54-86) 128/74     Weight: 87.1 kg (192 lb 1.6 oz) (per bed scale)  Body mass index is 31.97 kg/m².    Intake/Output Summary (Last 24 hours) at 3/11/2024 1012  Last data filed at 3/11/2024 0905  Gross per 24 hour   Intake 980 ml   Output --   Net 980 ml           Physical Exam  HENT:      Head: Normocephalic.      Mouth/Throat:      Mouth: Mucous membranes are moist.   Cardiovascular:      Rate and Rhythm: Normal rate and regular rhythm.      Pulses: Normal pulses.      Heart sounds: Normal heart sounds. No murmur heard.     No friction rub. No gallop.   Pulmonary:      Effort: Pulmonary effort is normal. No respiratory distress.      Breath sounds: Normal breath sounds. No stridor. No wheezing, rhonchi or rales.   Chest:      Chest wall: No tenderness.   Abdominal:      General: Bowel sounds are normal. There is no distension.      Palpations: Abdomen is soft. There is no mass.      Tenderness: There is no abdominal tenderness. There is no guarding or rebound.      Hernia: No hernia is present.    Musculoskeletal:      Cervical back: Normal range of motion.      Comments: Thoracic back pain - tlso brace on   Skin:     General: Skin is warm and dry.   Neurological:      Mental Status: She is alert and oriented to person, place, and time.      Motor: Weakness present.   Psychiatric:         Mood and Affect: Mood normal.             Significant Labs: All pertinent labs within the past 24 hours have been reviewed.  Recent Lab Results         03/11/24  0605        Anion Gap 11       Bands 3       Baso # 0.02       Basophil % 0.3       BUN 14       BUN/CREAT RATIO 21       Calcium 9.2       Chloride 98       CO2 31       Creatinine 0.66       Differential Method Manual       eGFR 90       Eos # 0.26       Eos % 3.8        4       Glucose 99       Hematocrit 28.2       Hemoglobin 9.2       Hypo Few       Lymph # 1.00       Lymph % 14.4        8       MCH 31.5       MCHC 32.6       MCV 96.6       Mono # 0.69       Mono % 10.0        10       MPV 9.2       Neutrophils, Abs 4.96       Neutrophils Relative 71.5       nRBC       PLATELET MORPHOLOGY Normal       Platelet Count 241       Potassium 3.7       RBC 2.92       RDW 12.6       Segmented Neutrophils, Man % 75       Sodium 136       WBC 6.93               Significant Imaging: I have reviewed all pertinent imaging results/findings within the past 24 hours.    Assessment/Plan:      Poor appetite    03/08/24 nurses report poor appetite - she is only eating about 25 % of meals served    03/11/24 gradually improving    Acute midline back pain    03/08/24 has taken ultram at [facility] for pain relief- states did not help  Tried norco yesterday  Complained of visual hallucinations after norco   Norco dcd and ordered lidoderm patch for back pain     03/11/24 pain controlled with lidocaine patch and ibuprofen   TLSO brace while out of bed     Impaired mobility and ADLs    03/08/24   Compression fracture t4 - age indeterminate   TLSO brace  PT and OT evaluation done  Plan is to  dc to swing bed tomorrow- continue therapy for strengthening    03/11/24 dc to swing bed to continue therapy  TLSO brace while out of bed    Dyslipidemia  03/07/24 continue statin      03/08/24 continue statin    GERD (gastroesophageal reflux disease)  03/07/24 continue protonix     03/08/24 continue protonix    03/11/24 continue protonix    Thoracic compression fracture    CT shows questionable mild age-indeterminate compression fracture of T4, correlate with point tenderness.   TLSO brace on - norco for pain relief        Dcd norco - due to hallucinations after taking this     03/08/24 Lidoderm patch and tylenol for pain relief  PT and OT to evaluate and treat       03/11/24 pain controlled with lidoderm patch and ibuprofen        VTE Risk Mitigation (From admission, onward)           Ordered     enoxaparin injection 40 mg  Daily         03/07/24 0658     Place JUNITO hose  Until discontinued         03/07/24 0658     IP VTE HIGH RISK PATIENT  Once         03/07/24 0046     Place sequential compression device  Until discontinued         03/07/24 0046     Place JUNITO hose  Until discontinued         03/07/24 0046                    Discharge Planning   MARILEE: 3/11/2024     Code Status: DNR   Is the patient medically ready for discharge?:     Reason for patient still in hospital (select all that apply): Treatment  Discharge Plan A: Home, Home Health                  LESLEE Wynn  Department of Hospital Medicine   [facility] - Medical Surgical Unit

## 2024-03-11 NOTE — ASSESSMENT & PLAN NOTE
03/08/24 nurses report poor appetite - she is only eating about 25 % of meals served    03/11/24 gradually improving

## 2024-03-11 NOTE — H&P
[facility] - Medical Surgical Unit  Hospital Medicine  History & Physical    Patient Name: Roselia Eldridge  MRN: [id number]  Patient Class: IP- Swing  Admission Date: 03/11/2025  Attending Physician: Galindo De Jesus IV   Primary Care Provider: Pamela Jurado DO         Patient information was obtained from patient, past medical records, and ER records.     Subjective:     Principal Problem:Generalized weakness    Chief Complaint:   Chief Complaint   Patient presents with    Weakness        HPI: Roselia Eldridge is a 78 year old female with pmh cervical spondylosis, primary hypertension, OAB, mixed hyperlipidemia, depression, insomnia, GERD, CKD, CAD and vitamin D deficiency.   She was admitted to inpatient on 03/06/24 for thoracic pain , right rib pain after fall at home and dehydration. She was given IV fluids and norco for pain. She began to have visual hallucinations after norco. Norco was dcd and back pain was treated with lidoderm patch and ibuprofen.  Denies any hallucinations since Saturday. She continues to have impaired mobility due to T4 compression fracture. She has TLSO brace that she wears while out of bed. States ibuprofen is working well for her back pain. She is agreeable with swing bed for continued therapy. She is wet this morning, hesitant to let nurses clean her- encouraged her to let nurses/ CNAs clean her so her skin will not become irritated and break down- states she is not a child, she will not lay in it that long. She is alert and oriented x 3.     Past Medical History:   Diagnosis Date    Asthma     Hypertension     Mixed hyperlipidemia        Past Surgical History:   Procedure Laterality Date    COSMETIC SURGERY         Review of patient's allergies indicates:   Allergen Reactions    Norco [hydrocodone-acetaminophen] Hallucinations    Pentazocine Nausea Only    Talwin compound Nausea And Vomiting       Current Facility-Administered Medications on File Prior to Encounter    Medication    acetaminophen tablet 650 mg    albuterol-ipratropium 2.5 mg-0.5 mg/3 mL nebulizer solution 3 mL    aspirin tablet 325 mg    atorvastatin tablet 20 mg    bisacodyL EC tablet 10 mg    calcium carbonate 200 mg calcium (500 mg) chewable tablet 500 mg    enoxaparin injection 40 mg    hydrocortisone 2.5 % rectal cream    ibuprofen tablet 600 mg    LIDOcaine 5 % patch 1 patch    melatonin tablet 6 mg    metoprolol succinate (TOPROL-XL) 24 hr tablet 50 mg    ondansetron injection 4 mg    pantoprazole EC tablet 40 mg    polyethylene glycol packet 17 g    potassium bicarbonate disintegrating tablet 35 mEq    potassium bicarbonate disintegrating tablet 60 mEq    senna-docusate 8.6-50 mg per tablet 1 tablet    sertraline tablet 100 mg    sodium chloride 0.9% flush 10 mL     Current Outpatient Medications on File Prior to Encounter   Medication Sig    albuterol (PROVENTIL/VENTOLIN HFA) 90 mcg/actuation inhaler Inhale 2 puffs into the lungs every 6 (six) hours as needed.    aspirin 325 MG tablet Take 325 mg by mouth every other day.    baclofen (LIORESAL) 10 MG tablet Take 10 mg by mouth 3 (three) times daily.    chlorthalidone (HYGROTEN) 25 MG Tab Take 25 mg by mouth.    fluticasone-salmeterol diskus inhaler 250-50 mcg Inhale 1 puff into the lungs.    HYDROcodone-acetaminophen (NORCO) 5-325 mg per tablet Take 1 tablet by mouth every 4 (four) hours as needed for Pain.    metoprolol succinate (TOPROL-XL) 50 MG 24 hr tablet Take 1 tablet by mouth 2 (two) times daily.    omeprazole (PRILOSEC) 20 MG capsule Take 20 mg by mouth once daily.    oxybutynin (DITROPAN-XL) 10 MG 24 hr tablet Take 1 tablet by mouth 2 (two) times daily.    sertraline (ZOLOFT) 100 MG tablet Take 100 mg by mouth.    simvastatin (ZOCOR) 40 MG tablet Take 1 tablet by mouth every evening.    traMADoL (ULTRAM) 50 mg tablet Take 50 mg by mouth every 6 (six) hours as needed.    triamcinolone acetonide 0.1% (KENALOG) 0.1 % cream Apply topically.     vitamin D (VITAMIN D3) 1000 units Tab Take 5,000 Units by mouth.     Family History    None       Tobacco Use    Smoking status: Never    Smokeless tobacco: Never   Substance and Sexual Activity    Alcohol use: Yes     Comment: daily    Drug use: Never    Sexual activity: Not on file     Review of Systems   Constitutional:  Negative for appetite change and fatigue.   HENT:  Negative for sinus pressure, sinus pain and trouble swallowing.    Eyes:  Negative for discharge and itching.   Respiratory:  Negative for cough.    Cardiovascular:  Negative for chest pain and palpitations.        Reports history of palpitations   Gastrointestinal:  Negative for constipation, diarrhea, nausea and vomiting.   Genitourinary:  Negative for difficulty urinating and dysuria.        OAB    Musculoskeletal:  Positive for arthralgias and back pain. Negative for joint swelling.        States has hx of DDD to neck - states pain to neck is at level 3-4 this morning. States she usually has cervical pillow for rest   Skin:  Negative for color change.   Neurological:  Positive for weakness. Negative for light-headedness and headaches.   Psychiatric/Behavioral:          History of major depression   On sertraline      Objective:     Vital Signs (Most Recent):    Vital Signs (24h Range):  Temp:  [97.8 °F (36.6 °C)-98.7 °F (37.1 °C)] 98.7 °F (37.1 °C)  Pulse:  [62-82] 82  Resp:  [18-20] 18  SpO2:  [94 %-98 %] 96 %  BP: ()/(54-86) 128/74        There is no height or weight on file to calculate BMI.     Physical Exam  HENT:      Head: Normocephalic.      Nose: Nose normal.      Mouth/Throat:      Mouth: Mucous membranes are moist.   Eyes:      Pupils: Pupils are equal, round, and reactive to light.   Cardiovascular:      Rate and Rhythm: Normal rate and regular rhythm.      Pulses: Normal pulses.      Heart sounds: Normal heart sounds.   Pulmonary:      Effort: Pulmonary effort is normal. No respiratory distress.      Breath sounds: Normal  "breath sounds. No stridor. No wheezing or rhonchi.   Abdominal:      General: Bowel sounds are normal. There is no distension.      Palpations: Abdomen is soft. There is no mass.      Tenderness: There is no abdominal tenderness. There is no guarding or rebound.      Hernia: No hernia is present.   Musculoskeletal:         General: No swelling, tenderness or signs of injury.      Right lower leg: No edema.      Left lower leg: No edema.      Comments: Complain sof pain with movement- has t4 compression fracture   TLSO brace while out of bed    Skin:     General: Skin is warm and dry.   Neurological:      Mental Status: She is alert and oriented to person, place, and time.      Motor: Weakness present.   Psychiatric:      Comments: Seems mildly agitated - she is wey and will not allow nurses to change her - encouraged to let then get urine off her skin so skin will not become irritated- states " I am not a child, it wont be on me that long. 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       Significant Labs: All pertinent labs within the past 24 hours have been reviewed.  Recent Lab Results         03/11/24  0605        Anion Gap 11       Bands 3       Baso # 0.02       Basophil % 0.3       BUN 14       BUN/CREAT RATIO 21       Calcium 9.2       Chloride 98       CO2 31       Creatinine 0.66       Differential Method Manual       eGFR 90       Eos # 0.26       Eos % 3.8        4       Glucose 99       Hematocrit 28.2       Hemoglobin 9.2       Hypo Few       Lymph # 1.00       Lymph % 14.4        8       MCH 31.5       MCHC 32.6       MCV 96.6       Mono # 0.69       Mono % 10.0        10       MPV 9.2       Neutrophils, Abs 4.96       Neutrophils Relative 71.5       nRBC       PLATELET MORPHOLOGY Normal       Platelet Count 241       Potassium 3.7       RBC 2.92       RDW 12.6       Segmented Neutrophils, Man % 75       Sodium 136       WBC " 6.93               Significant Imaging: I have reviewed all pertinent imaging results/findings within the past 24 hours.  Assessment/Plan:     * Generalized weakness    03/11/24 t4 compression fracture   TLSO brace  while out of bed   Fall precautions     Thoracic compression fracture  03/11/24   TLSO brace while out of bed  Ibuprofen and lidoderm patch for pain relief   Fall precautions      Impaired mobility and ADLs    03/11/24 T 4 compression fracture  TLSO brace while out of bed  PT and OT to evaluate and treat     Acute midline back pain  03/11/24 lidoderm patch and ibuprofen for relief       Primary hypertension  Chronic, controlled. Latest blood pressure and vitals reviewed-     Temp:  [97.8 °F (36.6 °C)-98.7 °F (37.1 °C)]   Pulse:  [62-82]   Resp:  [18-20]   BP: ()/(54-86)   SpO2:  [94 %-98 %] .   Home meds for hypertension were reviewed and noted below.   Hypertension Medications               chlorthalidone (HYGROTEN) 25 MG Tab Take 25 mg by mouth.    metoprolol succinate (TOPROL-XL) 50 MG 24 hr tablet Take 1 tablet by mouth 2 (two) times daily.            While in the hospital, will manage blood pressure as follows; Continue home antihypertensive regimen    Will utilize p.r.n. blood pressure medication only if patient's blood pressure greater than 180/110 and she develops symptoms such as worsening chest pain or shortness of breath.    GERD (gastroesophageal reflux disease)    03/11/24 continue protonix    Poor appetite    03/11/24 improving, ate 100& of breakfast served this morning    Depression  Hx of major depression   Controlled - continue sertraline           VTE Risk Mitigation (From admission, onward)      None                            Galindo De Jesus IV, DO  Department of Hospital Medicine  [facility] - Medical Surgical Unit

## 2024-03-12 PROBLEM — M54.2 NECK PAIN: Status: ACTIVE | Noted: 2024-03-12

## 2024-03-12 PROBLEM — S22.000S THORACIC COMPRESSION FRACTURE, SEQUELA: Status: ACTIVE | Noted: 2024-03-07

## 2024-03-12 LAB
ALBUMIN SERPL BCP-MCNC: 2 G/DL (ref 3.5–5)
ALBUMIN/GLOB SERPL: 0.5 {RATIO}
ALP SERPL-CCNC: 113 U/L (ref 55–142)
ALT SERPL W P-5'-P-CCNC: 78 U/L (ref 13–56)
ANION GAP SERPL CALCULATED.3IONS-SCNC: 9 MMOL/L (ref 7–16)
AST SERPL W P-5'-P-CCNC: 147 U/L (ref 15–37)
BASOPHILS # BLD AUTO: 0.02 K/UL (ref 0–0.2)
BASOPHILS NFR BLD AUTO: 0.2 % (ref 0–1)
BILIRUB SERPL-MCNC: 0.2 MG/DL (ref ?–1.2)
BUN SERPL-MCNC: 18 MG/DL (ref 7–18)
BUN/CREAT SERPL: 22 (ref 6–20)
CALCIUM SERPL-MCNC: 9.2 MG/DL (ref 8.5–10.1)
CHLORIDE SERPL-SCNC: 102 MMOL/L (ref 98–107)
CO2 SERPL-SCNC: 32 MMOL/L (ref 21–32)
CREAT SERPL-MCNC: 0.82 MG/DL (ref 0.55–1.02)
DIFFERENTIAL METHOD BLD: ABNORMAL
EGFR (NO RACE VARIABLE) (RUSH/TITUS): 74 ML/MIN/1.73M2
EOSINOPHIL # BLD AUTO: 0.35 K/UL (ref 0–0.5)
EOSINOPHIL NFR BLD AUTO: 4.1 % (ref 1–4)
EOSINOPHIL NFR BLD MANUAL: 3 % (ref 1–4)
ERYTHROCYTE [DISTWIDTH] IN BLOOD BY AUTOMATED COUNT: 12.9 % (ref 11.5–14.5)
GLOBULIN SER-MCNC: 4.4 G/DL (ref 2–4)
GLUCOSE SERPL-MCNC: 104 MG/DL (ref 74–106)
HCT VFR BLD AUTO: 28.9 % (ref 38–47)
HGB BLD-MCNC: 9.5 G/DL (ref 12–16)
LYMPHOCYTES # BLD AUTO: 1.4 K/UL (ref 1–4.8)
LYMPHOCYTES NFR BLD AUTO: 16.5 % (ref 27–41)
LYMPHOCYTES NFR BLD MANUAL: 17 % (ref 27–41)
MCH RBC QN AUTO: 31.9 PG (ref 27–31)
MCHC RBC AUTO-ENTMCNC: 32.9 G/DL (ref 32–36)
MCV RBC AUTO: 97 FL (ref 80–96)
MONOCYTES # BLD AUTO: 0.89 K/UL (ref 0–0.8)
MONOCYTES NFR BLD AUTO: 10.5 % (ref 2–6)
MONOCYTES NFR BLD MANUAL: 8 % (ref 2–6)
MPC BLD CALC-MCNC: 9.1 FL (ref 9.4–12.4)
NEUTROPHILS # BLD AUTO: 5.84 K/UL (ref 1.8–7.7)
NEUTROPHILS NFR BLD AUTO: 68.7 % (ref 53–65)
NEUTS BAND NFR BLD MANUAL: 2 % (ref 1–5)
NEUTS SEG NFR BLD MANUAL: 70 % (ref 50–62)
NRBC BLD MANUAL-RTO: ABNORMAL %
PLATELET # BLD AUTO: 259 K/UL (ref 150–400)
PLATELET MORPHOLOGY: NORMAL
POTASSIUM SERPL-SCNC: 3.9 MMOL/L (ref 3.5–5.1)
PROT SERPL-MCNC: 6.4 G/DL (ref 6.4–8.2)
RBC # BLD AUTO: 2.98 M/UL (ref 4.2–5.4)
RBC MORPH BLD: NORMAL
SODIUM SERPL-SCNC: 139 MMOL/L (ref 136–145)
WBC # BLD AUTO: 8.5 K/UL (ref 4.5–11)

## 2024-03-12 PROCEDURE — 99305 1ST NF CARE MODERATE MDM 35: CPT | Mod: ,,, | Performed by: FAMILY MEDICINE

## 2024-03-12 PROCEDURE — 85025 COMPLETE CBC W/AUTO DIFF WBC: CPT | Performed by: NURSE PRACTITIONER

## 2024-03-12 PROCEDURE — 27000982 HC MATTRESS, MATRIX LAL RENTAL

## 2024-03-12 PROCEDURE — 99900035 HC TECH TIME PER 15 MIN (STAT)

## 2024-03-12 PROCEDURE — 63600175 PHARM REV CODE 636 W HCPCS: Performed by: NURSE PRACTITIONER

## 2024-03-12 PROCEDURE — 11000004 HC SNF PRIVATE

## 2024-03-12 PROCEDURE — 97116 GAIT TRAINING THERAPY: CPT | Mod: CQ

## 2024-03-12 PROCEDURE — 97110 THERAPEUTIC EXERCISES: CPT

## 2024-03-12 PROCEDURE — 27000221 HC OXYGEN, UP TO 24 HOURS

## 2024-03-12 PROCEDURE — 80053 COMPREHEN METABOLIC PANEL: CPT | Performed by: NURSE PRACTITIONER

## 2024-03-12 PROCEDURE — 94761 N-INVAS EAR/PLS OXIMETRY MLT: CPT

## 2024-03-12 PROCEDURE — 25000003 PHARM REV CODE 250: Performed by: NURSE PRACTITIONER

## 2024-03-12 PROCEDURE — 97530 THERAPEUTIC ACTIVITIES: CPT | Mod: CQ

## 2024-03-12 PROCEDURE — 27000944

## 2024-03-12 PROCEDURE — 25000003 PHARM REV CODE 250: Performed by: FAMILY MEDICINE

## 2024-03-12 RX ORDER — METHOCARBAMOL 500 MG/1
500 TABLET, FILM COATED ORAL 4 TIMES DAILY PRN
Status: DISCONTINUED | OUTPATIENT
Start: 2024-03-12 | End: 2024-03-22 | Stop reason: HOSPADM

## 2024-03-12 RX ORDER — TRAMADOL HYDROCHLORIDE 50 MG/1
50 TABLET ORAL EVERY 6 HOURS PRN
Status: DISCONTINUED | OUTPATIENT
Start: 2024-03-12 | End: 2024-03-22 | Stop reason: HOSPADM

## 2024-03-12 RX ORDER — ASPIRIN 325 MG
325 TABLET ORAL EVERY OTHER DAY
Status: DISCONTINUED | OUTPATIENT
Start: 2024-03-13 | End: 2024-03-22 | Stop reason: HOSPADM

## 2024-03-12 RX ADMIN — ATORVASTATIN CALCIUM 20 MG: 10 TABLET, FILM COATED ORAL at 09:03

## 2024-03-12 RX ADMIN — SENNOSIDES AND DOCUSATE SODIUM 1 TABLET: 8.6; 5 TABLET ORAL at 08:03

## 2024-03-12 RX ADMIN — HYDROCORTISONE 2.5%: 25 CREAM TOPICAL at 09:03

## 2024-03-12 RX ADMIN — HYDROCORTISONE 2.5%: 25 CREAM TOPICAL at 08:03

## 2024-03-12 RX ADMIN — TRAMADOL HYDROCHLORIDE 50 MG: 50 TABLET, COATED ORAL at 06:03

## 2024-03-12 RX ADMIN — METHOCARBAMOL 500 MG: 500 TABLET ORAL at 10:03

## 2024-03-12 RX ADMIN — ENOXAPARIN SODIUM 40 MG: 40 INJECTION SUBCUTANEOUS at 04:03

## 2024-03-12 RX ADMIN — METOPROLOL SUCCINATE 50 MG: 50 TABLET, EXTENDED RELEASE ORAL at 09:03

## 2024-03-12 RX ADMIN — LIDOCAINE 1 PATCH: 50 PATCH CUTANEOUS at 12:03

## 2024-03-12 RX ADMIN — IBUPROFEN 600 MG: 200 TABLET, FILM COATED ORAL at 08:03

## 2024-03-12 RX ADMIN — TRAMADOL HYDROCHLORIDE 50 MG: 50 TABLET, COATED ORAL at 11:03

## 2024-03-12 RX ADMIN — OXYBUTYNIN CHLORIDE 10 MG: 5 TABLET, EXTENDED RELEASE ORAL at 09:03

## 2024-03-12 RX ADMIN — OXYBUTYNIN CHLORIDE 10 MG: 5 TABLET, EXTENDED RELEASE ORAL at 08:03

## 2024-03-12 RX ADMIN — Medication 5000 UNITS: at 09:03

## 2024-03-12 RX ADMIN — MELATONIN TAB 3 MG 6 MG: 3 TAB at 08:03

## 2024-03-12 RX ADMIN — PANTOPRAZOLE SODIUM 40 MG: 40 TABLET, DELAYED RELEASE ORAL at 09:03

## 2024-03-12 RX ADMIN — SERTRALINE HYDROCHLORIDE 100 MG: 50 TABLET ORAL at 09:03

## 2024-03-12 RX ADMIN — METOPROLOL SUCCINATE 50 MG: 50 TABLET, EXTENDED RELEASE ORAL at 08:03

## 2024-03-12 RX ADMIN — ACETAMINOPHEN 650 MG: 325 TABLET ORAL at 03:03

## 2024-03-12 RX ADMIN — SENNOSIDES AND DOCUSATE SODIUM 1 TABLET: 8.6; 5 TABLET ORAL at 09:03

## 2024-03-12 NOTE — HOSPITAL COURSE
03/12/24 Called to room to check on Mrs. Eldridge- nurse told me that she had just woke up and that she is not talking to her. On my arrival, she is grunting. Asked her to talk to me and she is alert and responsive. She is oriented x 3. She tells me her neck hurts- states she has history of DDD. Asked her to rank pain on scale 1- 10 and she says it hurts a lot. Asked her what usually helps with her pain, states she has gotten botox injections in the past. Asked her if ibuproen has helped and she states it has. Will get dose of ibuprofen and monitor. She is able to follow all commands, has equal strength bilaterally.  Review of records show imaging on 02/16/24.-The cervical vertebral body heights are maintained. There is minimal anterolisthesis of C3 over C4. There is no evidence of significant translational instability on flexion or extension views. Multilevel degenerative changes including mild multilevel   intervertebral disc space loss and mild marginal osteophytosis. There is multilevel degenerative uncovertebral joint arthropathy bilaterally. Also shows botox injections to neck on 02/19/24.   Consulted with Dr. De Jesus re neck pain.     03/13/24 Awake and resting in bed. Complains of neck pain - states she always has this pain but that robaxin and ultram have helped. Requesting robaxin this morning. Talked with her re therapy - States she was able to walk some yesterday- records show she walked 10 feet twice. Encouraged her to continue to participate and do more as she is able.  She has o2 per nc - does not use this at home - will wean to room air as she tolerates. Ate 100% of breakfast served.      03/14/24 Awake and resting in bed. Complains of pain at base of neck with radiation of pain to shoulders. States she took baclofen at hs while at home and that helped. She is on robaxin now. Had increase in AST/ALT  a few days ago . Statin and ibuprofen dcd. Now normal. She is requesting motrin for neck pain. Will order  BID prn and watch labs. Complains of dry eyes. States she uses artificial tears at home - ordered this for her. Continue therapy for strengthening.      0955 went back to check on Mrs. Eldridge. She states she is comfortable now. Denies neck pain and headache.      03/15/24 Awake and resting in bed. States neck pain is better but continues to have some pain at base of neck. Reports good appetite. She reports using the bed pan. Talked with her re the importance of functioning to the highest level that she is able, including getting out of the bed to go to the toilet. Her goal is to return home alone. Asked her to do more with therapy and to call us when she needs to go to the toilet.    03/18/24 Resting in bed. Complains of nasal congestion. States neck pain is much better. Walked total of 253 feet with therapist today. Continue work on balance and safety.    03/19/24 complains of nasal congestion. Added neosynephrine prn. Complains of psoriasis , itching to posterior scalp. Will order ketoconazole cream. Improving with therapy.    03/21/24 walked a total of 460 feet yesterday. Talked with her re discharge tomorrow. Will order home health with PT and OT to follow. PCP is Pamela Jurado. She uses [facility] to her meds. She states she is looking forward to discharge tomorrow. She will return home alone. States she has neighbors who can help her is needed.     03/22/24 mobility has greatly improved with therapy. She has ambulated over 400 feet with walker, asccended and descended stairs. Will dc home with home health with PT and OT to follow . Medications reconciled. Will make follow up appt with PCP for within 3 business days.

## 2024-03-12 NOTE — ASSESSMENT & PLAN NOTE
Chronic, controlled. Latest blood pressure and vitals reviewed-     Temp:  [97.4 °F (36.3 °C)-98.2 °F (36.8 °C)]   Pulse:  [57-75]   Resp:  [18-20]   BP: (108-136)/(55-71)   SpO2:  [94 %-98 %] .   Home meds for hypertension were reviewed and noted below.   Hypertension Medications               chlorthalidone (HYGROTEN) 25 MG Tab Take 25 mg by mouth.    metoprolol succinate (TOPROL-XL) 50 MG 24 hr tablet Take 1 tablet by mouth 2 (two) times daily.            While in the hospital, will manage blood pressure as follows; Continue home antihypertensive regimen    Will utilize p.r.n. blood pressure medication only if patient's blood pressure greater than 180/110 and she develops symptoms such as worsening chest pain or shortness of breath.      03/12/24 stable

## 2024-03-12 NOTE — ASSESSMENT & PLAN NOTE
03/11/24   TLSO brace while out of bed  Ibuprofen and lidoderm patch for pain relief   Fall precautions      03/12/24 continue TLSO brace while out of bed  Ibuproen and lidoderm patch

## 2024-03-12 NOTE — PLAN OF CARE
Problem: Fall Injury Risk  Goal: Absence of Fall and Fall-Related Injury  Outcome: Ongoing, Progressing  Intervention: Promote Injury-Free Environment  Flowsheets (Taken 3/12/2024 1500)  Safety Promotion/Fall Prevention:   assistive device/personal item within reach   nonskid shoes/socks when out of bed   chair alarm set   room near unit station   instructed to call staff for mobility   commode/urinal/bedpan at bedside

## 2024-03-12 NOTE — ASSESSMENT & PLAN NOTE
03/11/24 t4 compression fracture   TLSO brace  while out of bed   Fall precautions       03/12/24 ambulated 48 feet with therapist yesterday

## 2024-03-12 NOTE — PT/OT/SLP PROGRESS
Physical Therapy Treatment    Patient Name:  Roselia Eldridge   MRN:  [id number]    Recommendations:     Discharge Recommendations: Low Intensity Therapy  Discharge Equipment Recommendations: walker, rolling  Barriers to discharge: Decreased caregiver support    Assessment:     Roselia Eldridge is a 78 y.o. female admitted with a medical diagnosis of Generalized weakness.  She presents with the following impairments/functional limitations: weakness, impaired endurance, impaired self care skills, impaired functional mobility, gait instability, impaired balance, decreased coordination, decreased safety awareness Patient requested for therapist to assist her to the bathroom. Therapist assisted patient to the bathroom, but patient then refused ambulation or transferring to a chair. Once patient was returned to bed she refused further exercises. .    Rehab Prognosis: Fair; patient would benefit from acute skilled PT services to address these deficits and reach maximum level of function.    Recent Surgery: * No surgery found *      Plan:     During this hospitalization, patient to be seen 5 x/week to address the identified rehab impairments via gait training, therapeutic activities, therapeutic exercises, neuromuscular re-education and progress toward the following goals:    Plan of Care Expires:  03/28/24    Subjective     Chief Complaint: weakness, pain  Patient/Family Comments/goals: return home   Pain/Comfort:  Pain Rating 1: 7/10  Location 1: cervical spine      Objective:     Communicated with PT prior to session.  Patient found right sidelying with   upon PT entry to room.     General Precautions: Standard, fall  Orthopedic Precautions:    Braces: TLSO  Respiratory Status: Nasal cannula, flow 2 L/min     Functional Mobility:  Bed Mobility:     Rolling Left:  minimum assistance  Supine to Sit: moderate assistance  Sit to Supine: moderate assistance  Transfers:     Sit to Stand:  minimum assistance with rolling  walker  Toilet Transfer: minimum assistance with  rolling walker  using  Step Transfer  Gait: Patient ambulated 10' x 2 with RW and CGA.      AM-PAC 6 CLICK MOBILITY  Turning over in bed (including adjusting bedclothes, sheets and blankets)?: 2  Sitting down on and standing up from a chair with arms (e.g., wheelchair, bedside commode, etc.): 3  Moving from lying on back to sitting on the side of the bed?: 2  Moving to and from a bed to a chair (including a wheelchair)?: 3  Need to walk in hospital room?: 3  Climbing 3-5 steps with a railing?: 2  Basic Mobility Total Score: 15       Treatment & Education:  Transfers and ambulation as listed above.   Patient refused further ambulation as well as exercises.     Patient left HOB elevated with call button in reach and bed alarm on..    GOALS:   Multidisciplinary Problems       Physical Therapy Goals          Problem: Physical Therapy    Goal Priority Disciplines Outcome Goal Variances Interventions   Physical Therapy Goal     PT, PT/OT Ongoing, Progressing     Description: Short-term Goals: 1.5 weeks  1. Patient will perform supine to/from sit with minimal assistance x 2 people to improve independence and safety with bed mobility.  2. Patient will perform sit to/from stand with a rolling walker with minimal assistance to improve independence and safety with transfers.  3. Patient will ambulate 150 feet with a rolling walker with contact guard assist to improve independence and safety with gait.  4. Patient will tolerate 15 minutes of physical therapy intervention to improve endurance and activity tolerance.    Long-term goals: 3 weeks  1. Patient will perform supine to/from sit with minimal assistance to improve independence and safety with bed mobility.  2. Patient will perform sit to/from stand with a rolling walker with contact guard assist to improve independence and safety with transfers.  3. Patient will ambulate 300 feet with a rolling walker with standby assist  to improve independence and safety with gait.  4. Patient will tolerate 30 minutes of physical therapy intervention to improve endurance and activity tolerance.                         Time Tracking:     PT Received On: 03/12/24  PT Start Time: 1028     PT Stop Time: 1103  PT Total Time (min): 35 min     Billable Minutes: Gait Training 10 and Therapeutic Activity 20    Treatment Type: Treatment  PT/PTA: PTA     Number of PTA visits since last PT visit: 1   ROCHELLE Velásquez   03/12/2024

## 2024-03-12 NOTE — ASSESSMENT & PLAN NOTE
03/12/24 complains of left neck pain - would not rank pain on pain scale -states it hurts a lot  Chart review shows imaging on 02/16 and botox injection son 02/19/24. Talked with her re what has releived her pain in past- she states botox has helped, ibuprofen helps.   Added ibuprofen and robaxin prn     The cervical vertebral body heights are maintained. There is minimal anterolisthesis of C3 over C4. There is no evidence of significant translational instability on flexion or extension views. Multilevel degenerative changes including mild multilevel   intervertebral disc space loss and mild marginal osteophytosis. There is multilevel degenerative uncovertebral joint arthropathy bilaterally.

## 2024-03-12 NOTE — PROGRESS NOTES
[facility] - Medical Surgical Unit  Hospital Medicine  Progress Note    Patient Name: Roselia Eldridge  MRN: [id number]  Patient Class: IP- Swing   Admission Date: 3/11/2024  Length of Stay: 1 days  Attending Physician: Galindo De Jesus IV, DO  Primary Care Provider: Pamela Jurado DO        Subjective:     Principal Problem:Generalized weakness        HPI:  Roselia Eldridge is a 78 year old female with pmh cervical spondylosis, primary hypertension, OAB, mixed hyperlipidemia, depression, insomnia, GERD, CKD, CAD and vitamin D deficiency.   She was admitted to inpatient on 03/06/24 for thoracic pain , right rib pain after fall at home and dehydration. She was given IV fluids and norco for pain. She began to have visual hallucinations after norco. Norco was dcd and back pain was treated with lidoderm patch and ibuprofen.  Denies any hallucinations since Saturday. She continues to have impaired mobility due to T4 compression fracture. She has TLSO brace that she wears while out of bed. States ibuprofen is working well for her back pain. She is agreeable with swing bed for continued therapy. She is wet this morning, hesitant to let nurses clean her- encouraged her to let nurses/ CNAs clean her so her skin will not become irritated and break down- states she is not a child, she will not lay in it that long. She is alert and oriented x 3.     Overview/Hospital Course:  03/12/24 Called to room to check on Mrs. Eldridge- nurse told me that she had just woke up and that she is not talking to her. On my arrival, she is grunting. Asked her to talk to me and she is alert and responsive. She is oriented x 3. She tells me her neck hurts- states she has history of DDD. Asked her to rank pain on scale 1- 10 and she says it hurts a lot. Asked her what usually helps with her pain, states she has gotten botox injections in the past. Asked her if ibuproen has helped and she states it has. Will get dose of ibuprofen and monitor.  She is able to follow all commands, has equal strength bilaterally.  Review of records show imaging on 02/16/24.-The cervical vertebral body heights are maintained. There is minimal anterolisthesis of C3 over C4. There is no evidence of significant translational instability on flexion or extension views. Multilevel degenerative changes including mild multilevel   intervertebral disc space loss and mild marginal osteophytosis. There is multilevel degenerative uncovertebral joint arthropathy bilaterally. Also shows botox injections to neck on 02/19/24.   Consulted with Dr. De Jesus re neck pain.     Interval History: neck pain     Review of Systems   Constitutional:  Negative for appetite change and fatigue.        States she is not hungry this morning    HENT:  Negative for sinus pressure, sinus pain and trouble swallowing.    Eyes:  Negative for discharge and itching.   Respiratory:  Negative for cough.    Cardiovascular:  Negative for chest pain and palpitations.        Reports history of palpitations   Gastrointestinal:  Negative for constipation, diarrhea, nausea and vomiting.   Genitourinary:  Negative for difficulty urinating and dysuria.        OAB    Musculoskeletal:  Positive for arthralgias. Negative for back pain and joint swelling.        States has hx of DDD to neck - askd her to rank pain on pain scale and she just says it hurts a lot - will try ibuprofen for relief- she states it has helped in the past. Robaxin prn every 6 hours     TLSO brace on    Skin:  Negative for color change.   Neurological:  Positive for weakness. Negative for light-headedness and headaches.   Psychiatric/Behavioral:  Negative for hallucinations.         History of major depression   On sertraline      Objective:     Vital Signs (Most Recent):  Temp: 97.4 °F (36.3 °C) (03/12/24 0808)  Pulse: (!) 57 (03/12/24 0808)  Resp: 20 (03/12/24 0525)  BP: 136/71 (03/12/24 0808)  SpO2: 98 % (03/12/24 0808) Vital Signs (24h Range):  Temp:   [97.4 °F (36.3 °C)-98.2 °F (36.8 °C)] 97.4 °F (36.3 °C)  Pulse:  [57-75] 57  Resp:  [18-20] 20  SpO2:  [94 %-98 %] 98 %  BP: (108-136)/(55-71) 136/71     Weight: 87.1 kg (192 lb 1.6 oz)  Body mass index is 31.97 kg/m².    Intake/Output Summary (Last 24 hours) at 3/12/2024 0933  Last data filed at 3/12/2024 0849  Gross per 24 hour   Intake 480 ml   Output --   Net 480 ml         Physical Exam  HENT:      Head: Normocephalic.      Nose: Nose normal.      Mouth/Throat:      Mouth: Mucous membranes are moist.   Eyes:      Pupils: Pupils are equal, round, and reactive to light.   Neck:     Cardiovascular:      Rate and Rhythm: Normal rate and regular rhythm.      Pulses: Normal pulses.      Heart sounds: Normal heart sounds.   Pulmonary:      Effort: Pulmonary effort is normal. No respiratory distress.      Breath sounds: Normal breath sounds. No stridor. No wheezing or rhonchi.   Abdominal:      General: Bowel sounds are normal. There is no distension.      Palpations: Abdomen is soft. There is no mass.      Tenderness: There is no abdominal tenderness. There is no guarding or rebound.      Hernia: No hernia is present.   Musculoskeletal:         General: No swelling, tenderness or signs of injury.      Right lower leg: No edema.      Left lower leg: No edema.      Comments: Complains of neck pain - lower posterior base of neck on left    Skin:     General: Skin is warm and dry.   Neurological:      Mental Status: She is alert and oriented to person, place, and time.      Motor: Weakness present.             Significant Labs: All pertinent labs within the past 24 hours have been reviewed.  Recent Lab Results       None            Significant Imaging: I have reviewed all pertinent imaging results/findings within the past 24 hours.    Assessment/Plan:      * Generalized weakness    03/11/24 t4 compression fracture   TLSO brace  while out of bed   Fall precautions       03/12/24 ambulated 48 feet with therapist  yesterday    Thoracic compression fracture  03/11/24   TLSO brace while out of bed  Ibuprofen and lidoderm patch for pain relief   Fall precautions      03/12/24 continue TLSO brace while out of bed  Ibuproen and lidoderm patch    Impaired mobility and ADLs    03/11/24 T 4 compression fracture  TLSO brace while out of bed  PT and OT to evaluate and treat     Acute midline back pain  03/11/24 lidoderm patch and ibuprofen for relief       Primary hypertension  Chronic, controlled. Latest blood pressure and vitals reviewed-     Temp:  [97.4 °F (36.3 °C)-98.2 °F (36.8 °C)]   Pulse:  [57-75]   Resp:  [18-20]   BP: (108-136)/(55-71)   SpO2:  [94 %-98 %] .   Home meds for hypertension were reviewed and noted below.   Hypertension Medications               chlorthalidone (HYGROTEN) 25 MG Tab Take 25 mg by mouth.    metoprolol succinate (TOPROL-XL) 50 MG 24 hr tablet Take 1 tablet by mouth 2 (two) times daily.            While in the hospital, will manage blood pressure as follows; Continue home antihypertensive regimen    Will utilize p.r.n. blood pressure medication only if patient's blood pressure greater than 180/110 and she develops symptoms such as worsening chest pain or shortness of breath.      03/12/24 stable    GERD (gastroesophageal reflux disease)    03/11/24 continue protonix    Poor appetite    03/11/24 improving, ate 100& of breakfast served this morning    Dyslipidemia  03/11/24 continue statin       Depression  Hx of major depression   Controlled - continue sertraline       03/12/24 continue sertraline       Neck pain  03/12/24 complains of left neck pain - would not rank pain on pain scale -states it hurts a lot  Chart review shows imaging on 02/16 and botox injection son 02/19/24. Talked with her re what has releived her pain in past- she states botox has helped, ibuprofen helps.   Added ibuprofen and robaxin prn     The cervical vertebral body heights are maintained. There is minimal anterolisthesis of  C3 over C4. There is no evidence of significant translational instability on flexion or extension views. Multilevel degenerative changes including mild multilevel   intervertebral disc space loss and mild marginal osteophytosis. There is multilevel degenerative uncovertebral joint arthropathy bilaterally.       VTE Risk Mitigation (From admission, onward)           Ordered     enoxaparin injection 40 mg  Every 24 hours         03/11/24 1248                    Discharge Planning   MARILEE: 3/28/2024     Code Status: DNR   Is the patient medically ready for discharge?:     Reason for patient still in hospital (select all that apply): Treatment                     LESLEE Yoo  Department of Hospital Medicine   [facility] - [facility] Surgical Unit

## 2024-03-12 NOTE — SUBJECTIVE & OBJECTIVE
Interval History: neck pain     Review of Systems   Constitutional:  Negative for appetite change and fatigue.        States she is not hungry this morning    HENT:  Negative for sinus pressure, sinus pain and trouble swallowing.    Eyes:  Negative for discharge and itching.   Respiratory:  Negative for cough.    Cardiovascular:  Negative for chest pain and palpitations.        Reports history of palpitations   Gastrointestinal:  Negative for constipation, diarrhea, nausea and vomiting.   Genitourinary:  Negative for difficulty urinating and dysuria.        OAB    Musculoskeletal:  Positive for arthralgias. Negative for back pain and joint swelling.        States has hx of DDD to neck - askd her to rank pain on pain scale and she just says it hurts a lot - will try ibuprofen for relief- she states it has helped in the past. Robaxin prn every 6 hours     TLSO brace on    Skin:  Negative for color change.   Neurological:  Positive for weakness. Negative for light-headedness and headaches.   Psychiatric/Behavioral:  Negative for hallucinations.         History of major depression   On sertraline      Objective:     Vital Signs (Most Recent):  Temp: 97.4 °F (36.3 °C) (03/12/24 0808)  Pulse: (!) 57 (03/12/24 0808)  Resp: 20 (03/12/24 0525)  BP: 136/71 (03/12/24 0808)  SpO2: 98 % (03/12/24 0808) Vital Signs (24h Range):  Temp:  [97.4 °F (36.3 °C)-98.2 °F (36.8 °C)] 97.4 °F (36.3 °C)  Pulse:  [57-75] 57  Resp:  [18-20] 20  SpO2:  [94 %-98 %] 98 %  BP: (108-136)/(55-71) 136/71     Weight: 87.1 kg (192 lb 1.6 oz)  Body mass index is 31.97 kg/m².    Intake/Output Summary (Last 24 hours) at 3/12/2024 0985  Last data filed at 3/12/2024 0849  Gross per 24 hour   Intake 480 ml   Output --   Net 480 ml         Physical Exam  HENT:      Head: Normocephalic.      Nose: Nose normal.      Mouth/Throat:      Mouth: Mucous membranes are moist.   Eyes:      Pupils: Pupils are equal, round, and reactive to light.   Neck:      Cardiovascular:      Rate and Rhythm: Normal rate and regular rhythm.      Pulses: Normal pulses.      Heart sounds: Normal heart sounds.   Pulmonary:      Effort: Pulmonary effort is normal. No respiratory distress.      Breath sounds: Normal breath sounds. No stridor. No wheezing or rhonchi.   Abdominal:      General: Bowel sounds are normal. There is no distension.      Palpations: Abdomen is soft. There is no mass.      Tenderness: There is no abdominal tenderness. There is no guarding or rebound.      Hernia: No hernia is present.   Musculoskeletal:         General: No swelling, tenderness or signs of injury.      Right lower leg: No edema.      Left lower leg: No edema.      Comments: Complains of neck pain - lower posterior base of neck on left    Skin:     General: Skin is warm and dry.   Neurological:      Mental Status: She is alert and oriented to person, place, and time.      Motor: Weakness present.             Significant Labs: All pertinent labs within the past 24 hours have been reviewed.  Recent Lab Results       None            Significant Imaging: I have reviewed all pertinent imaging results/findings within the past 24 hours.

## 2024-03-12 NOTE — PT/OT/SLP PROGRESS
Occupational Therapy   Treatment    Name: Roselia Eldridge  MRN: [id number]  Admitting Diagnosis:  Generalized weakness; thoracic spine fx       Recommendations:     Discharge Recommendations:    Discharge Equipment Recommendations:     Barriers to discharge:       Assessment:     Roselia Eldridge is a 78 y.o. female with a medical diagnosis of Generalized weakness thoracic spine fx .  She presents with decreased balance, safety. Performance deficits affecting function are weakness, impaired endurance, impaired self care skills, impaired functional mobility, gait instability, impaired balance, decreased coordination, decreased safety awareness. OT attempted to treat pt however she deferred until later due to eating. Pt agreeable on second attempt but had returned to bed. Pt was agreeable to bed exercises.     Rehab Prognosis:   TBD with OOB activity ; patient would benefit from acute skilled OT services to address these deficits and reach maximum level of function.       Plan:     Patient to be seen   to address the above listed problems via self-care/home management, therapeutic activities, therapeutic exercises, neuromuscular re-education  Plan of Care Expires:    Plan of Care Reviewed with:      Subjective     Chief Complaint: limiited trunk mobility  Patient/Family Comments/goals: increase ADLs and mobility  Pain/Comfort:  Pain Rating 1: 3/10  Location - Side 1: Right  Location - Orientation 1: lower    Objective:     Communicated with: Pt prior to session.  Patient found supine with   upon OT entry to room.    General Precautions: Standard, fall    Orthopedic Precautions:   Braces:    Respiratory Status: Room air     Occupational Performance:       Functional Mobility/Transfers:  Functional Mobility: n/a    Activities of Daily Living:  N/a      [facility] 6 Click ADL: 15    Treatment & Education:  Therapeutic exercise  While laying supine in bedthe pt completed:    tricep extension 2 sets of 15 rep with green theraband to  increase BUE strength to aid in t/fs  Scapular retraction: 2 sets of 10 reps to increase spinal stability for unsupported ADLs, posture and sitting tolerance    Patient left supine with all lines intact and call button in reach    GOALS:   Multidisciplinary Problems       Occupational Therapy Goals       Not on file                    Time Tracking:     OT Date of Treatment:    OT Start Time: 0240  OT Stop Time: 0250  OT Total Time (min): 10 min    Billable Minutes:Therapeutic Exercise 10    OT/JAIRON: OT          3/12/2024

## 2024-03-12 NOTE — PLAN OF CARE
[facility] - Medical Surgical Unit - Swing Bed   Interdisciplinary Team Meeting    Patient: Roselia Eldridge   Today's Date: 3/12/2024   Estimated D/C Date: 3/28/2024        Physician: Galindo De Jesus MD Nurse Practitioner: Aliyah Yu NP   Pharmacy: Dereje TrevinoD Unit Director: DAMARIS Ko   :  Aimee Pinon RN Physical/Occupational Therapy: Allyn Vega PTA   Speech Therapy: THELMA Activity Therapy: Sophia Babb LPN   Nursing: DAMARIS Ko  Respiratory: RT Javier Dietary: Kelly Corral  Other:      Nursing  New Symptoms/Problems: Complains of neck pain  Last Bowel Movement: 03/10/24   Urine: incontinent  Osei: No   Bowel: continent  Constipated: No  Diarrhea: No   Isolation: No  Cognition: WNL  Aspiration Precautions:No  Wound Care: No  Wound Location/Tx:   Comment(s):     Respiratory   O2 Device: Nasal Cannula  O2 Flow: 2 L  SpO2: 98%  Neb Tx: Yes  Comment(s): nebs Q 6 hours prn wheezing     Dietary  Nutrition: Regular  Comment(s):     Speech Therapy  Speech/Swallowing: No current speech or swallowing issues  Comment(s):     Physical Therapy  Gait/Assistive Device: 48' W /RW ELOS: Plan to DC 3/28/2024    Transfers: Minimal Assistance  Bed Mobility: Moderate Assistance Range of Motion/Restrictions: TLSO brace when out of bed  Comment(s):      Occupational Therapy  Eating/Grooming: Supervision or Set-up Assistance Toileting: Maximum Assistance   Bathing: Moderate Assistance Dressing (Upper Body): Maximum Assistance   Dressing (Lower Body): Total Assistance Comment(s):      Activity Therapy  Level of participation: Active participation  Comment(s):     Pharmacy  Medication Changes (see MD orders in chart): Yes  Labs Reviewed: Yes  New Lab Orders: Yes  Comment(s): BMP, CBC, Mg, Ph every M/Th      Tx Plan/Recommendations reviewed with family and/or patient on (date) 03/11/2024.  Additional family Conference/Training:   D/C  Plan/Recommendations: Home with   MARILEE: 3/28/2024  Comment(s):

## 2024-03-13 PROBLEM — S22.000D COMPRESSION FRACTURE OF THORACIC VERTEBRA WITH ROUTINE HEALING: Status: ACTIVE | Noted: 2024-03-07

## 2024-03-13 PROCEDURE — 11000004 HC SNF PRIVATE

## 2024-03-13 PROCEDURE — 99900035 HC TECH TIME PER 15 MIN (STAT)

## 2024-03-13 PROCEDURE — 25000003 PHARM REV CODE 250: Performed by: FAMILY MEDICINE

## 2024-03-13 PROCEDURE — 97165 OT EVAL LOW COMPLEX 30 MIN: CPT

## 2024-03-13 PROCEDURE — 94761 N-INVAS EAR/PLS OXIMETRY MLT: CPT

## 2024-03-13 PROCEDURE — 63600175 PHARM REV CODE 636 W HCPCS: Performed by: NURSE PRACTITIONER

## 2024-03-13 PROCEDURE — 97110 THERAPEUTIC EXERCISES: CPT | Mod: CO

## 2024-03-13 PROCEDURE — 97116 GAIT TRAINING THERAPY: CPT | Mod: CQ

## 2024-03-13 PROCEDURE — 25000003 PHARM REV CODE 250: Performed by: NURSE PRACTITIONER

## 2024-03-13 PROCEDURE — A6212 FOAM DRG <=16 SQ IN W/BORDER: HCPCS

## 2024-03-13 PROCEDURE — 99309 SBSQ NF CARE MODERATE MDM 30: CPT | Mod: ,,, | Performed by: NURSE PRACTITIONER

## 2024-03-13 PROCEDURE — 97530 THERAPEUTIC ACTIVITIES: CPT | Mod: CQ

## 2024-03-13 PROCEDURE — 27000221 HC OXYGEN, UP TO 24 HOURS

## 2024-03-13 PROCEDURE — 27000944

## 2024-03-13 PROCEDURE — 27000982 HC MATTRESS, MATRIX LAL RENTAL

## 2024-03-13 RX ORDER — ACETAMINOPHEN 325 MG/1
650 TABLET ORAL EVERY 8 HOURS PRN
Status: DISCONTINUED | OUTPATIENT
Start: 2024-03-13 | End: 2024-03-22 | Stop reason: HOSPADM

## 2024-03-13 RX ORDER — HYDROCORTISONE 25 MG/G
CREAM TOPICAL 2 TIMES DAILY PRN
Status: DISCONTINUED | OUTPATIENT
Start: 2024-03-13 | End: 2024-03-22 | Stop reason: HOSPADM

## 2024-03-13 RX ADMIN — SENNOSIDES AND DOCUSATE SODIUM 1 TABLET: 8.6; 5 TABLET ORAL at 08:03

## 2024-03-13 RX ADMIN — OXYBUTYNIN CHLORIDE 10 MG: 5 TABLET, EXTENDED RELEASE ORAL at 08:03

## 2024-03-13 RX ADMIN — METOPROLOL SUCCINATE 50 MG: 50 TABLET, EXTENDED RELEASE ORAL at 08:03

## 2024-03-13 RX ADMIN — METHOCARBAMOL 500 MG: 500 TABLET ORAL at 03:03

## 2024-03-13 RX ADMIN — ENOXAPARIN SODIUM 40 MG: 40 INJECTION SUBCUTANEOUS at 04:03

## 2024-03-13 RX ADMIN — MELATONIN TAB 3 MG 6 MG: 3 TAB at 08:03

## 2024-03-13 RX ADMIN — HYDROCORTISONE 2.5%: 25 CREAM TOPICAL at 08:03

## 2024-03-13 RX ADMIN — PANTOPRAZOLE SODIUM 40 MG: 40 TABLET, DELAYED RELEASE ORAL at 08:03

## 2024-03-13 RX ADMIN — METHOCARBAMOL 500 MG: 500 TABLET ORAL at 08:03

## 2024-03-13 RX ADMIN — HYDROCORTISONE: 25 CREAM TOPICAL at 08:03

## 2024-03-13 RX ADMIN — SERTRALINE HYDROCHLORIDE 100 MG: 50 TABLET ORAL at 08:03

## 2024-03-13 RX ADMIN — ASPIRIN 325 MG ORAL TABLET 325 MG: 325 PILL ORAL at 08:03

## 2024-03-13 RX ADMIN — LIDOCAINE 1 PATCH: 50 PATCH CUTANEOUS at 12:03

## 2024-03-13 RX ADMIN — Medication 5000 UNITS: at 08:03

## 2024-03-13 NOTE — SUBJECTIVE & OBJECTIVE
Interval History: weakness    Review of Systems   Constitutional:  Negative for appetite change and fatigue.   HENT:  Negative for sinus pressure, sinus pain and trouble swallowing.    Eyes:  Negative for discharge and itching.   Respiratory:  Negative for cough.    Cardiovascular:  Negative for chest pain and palpitations.        Reports history of palpitations   Gastrointestinal:  Negative for constipation, diarrhea, nausea and vomiting.   Genitourinary:  Negative for difficulty urinating and dysuria.        OAB    Musculoskeletal:  Positive for arthralgias. Negative for back pain and joint swelling.        Complains of neck pain - request robaxin    Skin:  Negative for color change.   Neurological:  Positive for weakness. Negative for light-headedness and headaches.   Psychiatric/Behavioral:  Negative for hallucinations.         History of major depression   On sertraline      Objective:     Vital Signs (Most Recent):  Temp: 97.7 °F (36.5 °C) (03/13/24 0710)  Pulse: 70 (03/13/24 0710)  Resp: 20 (03/13/24 0710)  BP: 122/69 (03/13/24 0710)  SpO2: 96 % (03/13/24 0710) Vital Signs (24h Range):  Temp:  [97.7 °F (36.5 °C)-98.5 °F (36.9 °C)] 97.7 °F (36.5 °C)  Pulse:  [61-70] 70  Resp:  [18-20] 20  SpO2:  [93 %-96 %] 96 %  BP: (111-122)/(66-69) 122/69     Weight:  (would not let me weight her. stated put the same weight she was when  she came here..)  Body mass index is 31.97 kg/m².    Intake/Output Summary (Last 24 hours) at 3/13/2024 0905  Last data filed at 3/12/2024 1740  Gross per 24 hour   Intake 240 ml   Output --   Net 240 ml         Physical Exam  HENT:      Head: Normocephalic.      Nose: Nose normal.      Mouth/Throat:      Mouth: Mucous membranes are moist.   Eyes:      Pupils: Pupils are equal, round, and reactive to light.   Neck:     Cardiovascular:      Rate and Rhythm: Normal rate and regular rhythm.      Pulses: Normal pulses.      Heart sounds: Normal heart sounds.   Pulmonary:      Effort: Pulmonary  effort is normal. No respiratory distress.      Breath sounds: Normal breath sounds. No stridor. No wheezing or rhonchi.   Abdominal:      General: Bowel sounds are normal. There is no distension.      Palpations: Abdomen is soft. There is no mass.      Tenderness: There is no abdominal tenderness. There is no guarding or rebound.      Hernia: No hernia is present.   Musculoskeletal:         General: No swelling, tenderness or signs of injury.      Right lower leg: No edema.      Left lower leg: No edema.      Comments: Complains of neck pain - lower posterior base of neck on left    Skin:     General: Skin is warm and dry.   Neurological:      Mental Status: She is alert and oriented to person, place, and time.      Motor: Weakness present.             Significant Labs: All pertinent labs within the past 24 hours have been reviewed.  Recent Lab Results         03/12/24  0932        Albumin/Globulin Ratio 0.5       Albumin 2.0              ALT 78       Anion Gap 9              Bands 2       Baso # 0.02       Basophil % 0.2       BILIRUBIN TOTAL 0.2       BUN 18       BUN/CREAT RATIO 22       Calcium 9.2       Chloride 102       CO2 32       Creatinine 0.82       Differential Method Manual       eGFR 74       Eos # 0.35       Eos % 4.1        3       Globulin, Total 4.4       Glucose 104       Hematocrit 28.9       Hemoglobin 9.5       Lymph # 1.40       Lymph % 16.5        17       MCH 31.9       MCHC 32.9       MCV 97.0       Mono # 0.89       Mono % 10.5        8       MPV 9.1       Neutrophils, Abs 5.84       Neutrophils Relative 68.7       nRBC       PLATELET MORPHOLOGY Normal       Platelet Count 259       Potassium 3.9       PROTEIN TOTAL 6.4       RBC 2.98       RBC Morphology Normal       RDW 12.9       Segmented Neutrophils, Man % 70       Sodium 139       WBC 8.50               Significant Imaging: I have reviewed all pertinent imaging results/findings within the past 24 hours.

## 2024-03-13 NOTE — PLAN OF CARE
Problem: Fall Injury Risk  Goal: Absence of Fall and Fall-Related Injury  Outcome: Ongoing, Progressing  Intervention: Promote Injury-Free Environment  Flowsheets (Taken 3/13/2024 1149)  Safety Promotion/Fall Prevention:   assistive device/personal item within reach   bed alarm set   chair alarm set   nonskid shoes/socks when out of bed   room near unit station   side rails raised x 3   instructed to call staff for mobility

## 2024-03-13 NOTE — PLAN OF CARE
Problem: Occupational Therapy  Goal: Occupational Therapy Goal  Description: STG:  Pt will perform grooming with setup  Pt will bathe with Mod I  Pt will perform UE dressing with Mod I  Pt will perform LE dressing with Mod I  Pt will sit EOB x 15 min with supervisoin assistance  Pt will transfer bed/chair/bsc with Mod I  Pt will perform standing task x 15 min with supervision assistance  Pt will tolerate 30 minutes of tx without fatigue      LT.Restore to max I with self care and mobility.    Outcome: Ongoing, Progressing

## 2024-03-13 NOTE — PLAN OF CARE
Problem: Adult Inpatient Plan of Care  Goal: Plan of Care Review  Outcome: Ongoing, Not Progressing  Goal: Patient-Specific Goal (Individualized)  Outcome: Ongoing, Not Progressing  Goal: Absence of Hospital-Acquired Illness or Injury  Outcome: Ongoing, Progressing  Goal: Optimal Comfort and Wellbeing  Outcome: Ongoing, Progressing  Goal: Readiness for Transition of Care  Outcome: Ongoing, Not Progressing     Problem: Skin Injury Risk Increased  Goal: Skin Health and Integrity  Outcome: Ongoing, Progressing     Problem: Fall Injury Risk  Goal: Absence of Fall and Fall-Related Injury  Outcome: Ongoing, Progressing

## 2024-03-13 NOTE — PT/OT/SLP PROGRESS
"Physical Therapy Treatment    Patient Name:  Roselia Eldridge   MRN:  [id number]    Recommendations:     Discharge Recommendations: Low Intensity Therapy  Discharge Equipment Recommendations: walker, rolling  Barriers to discharge: Decreased caregiver support    Assessment:     Roselia Eldridge is a 78 y.o. female admitted with a medical diagnosis of Generalized weakness.  She presents with the following impairments/functional limitations: weakness, decreased upper extremity function, impaired endurance, impaired balance, decreased safety awareness, impaired self care skills, impaired functional mobility, decreased coordination Upon arrival into room, patient reported having a "53" pain level in the base of her head and wrapping around her neck into her jaw. Patient reported she was unable to perform any movements without holding her head so that it "would not fall off". Patient was agreeable to attempt therapy with therapist despite her pain. Patient reported needing assistance to the toilet before ambulation. Before transferring to the toilet, the patient was agreeable with therapist to ambulate further today. Patient then became unwilling to participate any further during therapy due to increased pain in neck as well as a headache. Therapist then educated patient on the importance of therapy in order to give her strength and improve her transfers along with ambulation. Patient voiced understanding, but then reported she was unsure why her feet are not working like they should be and expressed concerns of having no MRI. Patient then also expressed her readiness to return home due to missing her dogs, so therapist once again attempted to get patient to ambulate in order to start the process of getting better to return home to her dogs. Patient was still unwilling. Therapist finally attempted to get patient to transfer to a chair and get out of the bed, but patient refused - "last time I was in that chair they left my feet " "up and in a corner so I could not move my legs at all, might as well just tie me down".     Rehab Prognosis: Fair; patient would benefit from acute skilled PT services to address these deficits and reach maximum level of function.    Recent Surgery: * No surgery found *      Plan:     During this hospitalization, patient to be seen 5 x/week to address the identified rehab impairments via gait training, therapeutic activities, therapeutic exercises, neuromuscular re-education and progress toward the following goals:    Plan of Care Expires:  03/28/24    Subjective     Chief Complaint: weakness, pain  Patient/Family Comments/goals: return home   Pain/Comfort:  Pain Rating 1:  (Patient would not rate pain on a scale of 0-10, but instead reported a level 53 in pain.)  Location 1: cervical spine      Objective:     Communicated with PT prior to session.  Patient found HOB elevated with   upon PT entry to room.     General Precautions: Standard, fall  Orthopedic Precautions:    Braces: TLSO  Respiratory Status: Nasal cannula, flow 2 L/min     Functional Mobility:  Bed Mobility:     Supine to Sit: minimum assistance  Sit to Supine: moderate assistance  Transfers:     Sit to Stand:  minimum assistance with rolling walker  Toilet Transfer: minimum assistance with  rolling walker  using  Step Transfer  Gait: Patient ambulated 10' x 2 with RW and CGA.      AM-PAC 6 CLICK MOBILITY  Turning over in bed (including adjusting bedclothes, sheets and blankets)?: 2  Sitting down on and standing up from a chair with arms (e.g., wheelchair, bedside commode, etc.): 3  Moving from lying on back to sitting on the side of the bed?: 2  Moving to and from a bed to a chair (including a wheelchair)?: 3  Need to walk in hospital room?: 3  Climbing 3-5 steps with a railing?: 2  Basic Mobility Total Score: 15       Treatment & Education:  Transfers and ambulation as listed above as well as education on importance of therapy.     Patient was " "unwilling to participate in further therapy due to increased pain in neck as well as a headache. Therapist educated patient on the importance of getting out of bed to perform ambulation and exercises due to patient's concerns of feeling as if her "feet are not working right". Therapist also stressed the importance of ambulation in order to help paint regain strength and endurance to return back home, but patient still refused - "maybe I can get this pain figures out in my head first". Patient expressed to therapist her concerns with moving due to having to "hold her head in place" or else she can not physically hold it up herself. Therapist then attempted to get patient to transfer to chair, but once again patient refused - "last time I was in that chair they left my feet up and in a corner so I could not move my legs at all, might as well just tie me down". Patient was unwilling to participate in further therapy.     Patient left HOB elevated with call button in reach and bed alarm on..    GOALS:   Multidisciplinary Problems       Physical Therapy Goals          Problem: Physical Therapy    Goal Priority Disciplines Outcome Goal Variances Interventions   Physical Therapy Goal     PT, PT/OT Ongoing, Progressing     Description: Short-term Goals: 1.5 weeks  1. Patient will perform supine to/from sit with minimal assistance x 2 people to improve independence and safety with bed mobility.  2. Patient will perform sit to/from stand with a rolling walker with minimal assistance to improve independence and safety with transfers.  3. Patient will ambulate 150 feet with a rolling walker with contact guard assist to improve independence and safety with gait.  4. Patient will tolerate 15 minutes of physical therapy intervention to improve endurance and activity tolerance.    Long-term goals: 3 weeks  1. Patient will perform supine to/from sit with minimal assistance to improve independence and safety with bed mobility.  2. " Patient will perform sit to/from stand with a rolling walker with contact guard assist to improve independence and safety with transfers.  3. Patient will ambulate 300 feet with a rolling walker with standby assist to improve independence and safety with gait.  4. Patient will tolerate 30 minutes of physical therapy intervention to improve endurance and activity tolerance.                         Time Tracking:     PT Received On: 03/13/24  PT Start Time: 1335     PT Stop Time: 1435  PT Total Time (min): 60 min     Billable Minutes: Gait Training 30 and Therapeutic Activity 30    Treatment Type: Treatment  PT/PTA: PTA     Number of PTA visits since last PT visit: 2   ROCHELLE Velásquez   03/13/2024

## 2024-03-13 NOTE — PROGRESS NOTES
[facility]  Hospital Medicine  Progress Note    Patient Name: Roselia Eldridge  MRN: [id number]  Patient Class: IP- Swing   Admission Date: 3/11/2024  Length of Stay: 2 days  Attending Physician: Galindo De Jesus IV, DO  Primary Care Provider: Pamela Jurado DO        Subjective:     Principal Problem:Generalized weakness        HPI:  Roselia Eldridge is a 78 year old female with pmh cervical spondylosis, primary hypertension, OAB, mixed hyperlipidemia, depression, insomnia, GERD, CKD, CAD and vitamin D deficiency.   She was admitted to inpatient on 03/06/24 for thoracic pain , right rib pain after fall at home and dehydration. She was given IV fluids and norco for pain. She began to have visual hallucinations after norco. Norco was dcd and back pain was treated with lidoderm patch and ibuprofen.  Denies any hallucinations since Saturday. She continues to have impaired mobility due to T4 compression fracture. She has TLSO brace that she wears while out of bed. States ibuprofen is working well for her back pain. She is agreeable with swing bed for continued therapy. She is wet this morning, hesitant to let nurses clean her- encouraged her to let nurses/ CNAs clean her so her skin will not become irritated and break down- states she is not a child, she will not lay in it that long. She is alert and oriented x 3.     Overview/Hospital Course:  03/12/24 Called to room to check on Mrs. Eldridge- nurse told me that she had just woke up and that she is not talking to her. On my arrival, she is grunting. Asked her to talk to me and she is alert and responsive. She is oriented x 3. She tells me her neck hurts- states she has history of DDD. Asked her to rank pain on scale 1- 10 and she says it hurts a lot. Asked her what usually helps with her pain, states she has gotten botox injections in the past. Asked her if ibuproen has helped and she states it has. Will get dose of ibuprofen and monitor.  She is able to follow all commands, has equal strength bilaterally.  Review of records show imaging on 02/16/24.-The cervical vertebral body heights are maintained. There is minimal anterolisthesis of C3 over C4. There is no evidence of significant translational instability on flexion or extension views. Multilevel degenerative changes including mild multilevel   intervertebral disc space loss and mild marginal osteophytosis. There is multilevel degenerative uncovertebral joint arthropathy bilaterally. Also shows botox injections to neck on 02/19/24.   Consulted with Dr. De Jesus re neck pain.     03/13/24 Awake and resting in bed. Complains of neck pain - states she always has this pain but that robaxin and ultram have helped. Requesting robaxin this morning. Talked with her re therapy - States she was able to walk some yesterday- records show she walked 10 feet twice. Encouraged her to continue to participate and do more as she is able.  She has o2 per nc - does not use this at home - will wean to room air as she tolerates. Ate 100% of breakfast served.    Interval History: weakness    Review of Systems   Constitutional:  Negative for appetite change and fatigue.   HENT:  Negative for sinus pressure, sinus pain and trouble swallowing.    Eyes:  Negative for discharge and itching.   Respiratory:  Negative for cough.    Cardiovascular:  Negative for chest pain and palpitations.        Reports history of palpitations   Gastrointestinal:  Negative for constipation, diarrhea, nausea and vomiting.   Genitourinary:  Negative for difficulty urinating and dysuria.        OAB    Musculoskeletal:  Positive for arthralgias. Negative for back pain and joint swelling.        Complains of neck pain - request robaxin    Skin:  Negative for color change.   Neurological:  Positive for weakness. Negative for light-headedness and headaches.   Psychiatric/Behavioral:  Negative for hallucinations.         History of major depression   On  sertraline      Objective:     Vital Signs (Most Recent):  Temp: 97.7 °F (36.5 °C) (03/13/24 0710)  Pulse: 70 (03/13/24 0710)  Resp: 20 (03/13/24 0710)  BP: 122/69 (03/13/24 0710)  SpO2: 96 % (03/13/24 0710) Vital Signs (24h Range):  Temp:  [97.7 °F (36.5 °C)-98.5 °F (36.9 °C)] 97.7 °F (36.5 °C)  Pulse:  [61-70] 70  Resp:  [18-20] 20  SpO2:  [93 %-96 %] 96 %  BP: (111-122)/(66-69) 122/69     Weight:  (would not let me weight her. stated put the same weight she was when  she came here..)  Body mass index is 31.97 kg/m².    Intake/Output Summary (Last 24 hours) at 3/13/2024 0905  Last data filed at 3/12/2024 1740  Gross per 24 hour   Intake 240 ml   Output --   Net 240 ml         Physical Exam  HENT:      Head: Normocephalic.      Nose: Nose normal.      Mouth/Throat:      Mouth: Mucous membranes are moist.   Eyes:      Pupils: Pupils are equal, round, and reactive to light.   Neck:     Cardiovascular:      Rate and Rhythm: Normal rate and regular rhythm.      Pulses: Normal pulses.      Heart sounds: Normal heart sounds.   Pulmonary:      Effort: Pulmonary effort is normal. No respiratory distress.      Breath sounds: Normal breath sounds. No stridor. No wheezing or rhonchi.   Abdominal:      General: Bowel sounds are normal. There is no distension.      Palpations: Abdomen is soft. There is no mass.      Tenderness: There is no abdominal tenderness. There is no guarding or rebound.      Hernia: No hernia is present.   Musculoskeletal:         General: No swelling, tenderness or signs of injury.      Right lower leg: No edema.      Left lower leg: No edema.      Comments: Complains of neck pain - lower posterior base of neck on left    Skin:     General: Skin is warm and dry.   Neurological:      Mental Status: She is alert and oriented to person, place, and time.      Motor: Weakness present.             Significant Labs: All pertinent labs within the past 24 hours have been reviewed.  Recent Lab Results          03/12/24  0932        Albumin/Globulin Ratio 0.5       Albumin 2.0              ALT 78       Anion Gap 9              Bands 2       Baso # 0.02       Basophil % 0.2       BILIRUBIN TOTAL 0.2       BUN 18       BUN/CREAT RATIO 22       Calcium 9.2       Chloride 102       CO2 32       Creatinine 0.82       Differential Method Manual       eGFR 74       Eos # 0.35       Eos % 4.1        3       Globulin, Total 4.4       Glucose 104       Hematocrit 28.9       Hemoglobin 9.5       Lymph # 1.40       Lymph % 16.5        17       MCH 31.9       MCHC 32.9       MCV 97.0       Mono # 0.89       Mono % 10.5        8       MPV 9.1       Neutrophils, Abs 5.84       Neutrophils Relative 68.7       nRBC       PLATELET MORPHOLOGY Normal       Platelet Count 259       Potassium 3.9       PROTEIN TOTAL 6.4       RBC 2.98       RBC Morphology Normal       RDW 12.9       Segmented Neutrophils, Man % 70       Sodium 139       WBC 8.50               Significant Imaging: I have reviewed all pertinent imaging results/findings within the past 24 hours.    Assessment/Plan:      * Generalized weakness    03/11/24 t4 compression fracture   TLSO brace  while out of bed   Fall precautions       03/12/24 ambulated 48 feet with therapist yesterday    03/13/24 walked 10 feet twice yesterday    Thoracic compression fracture, sequela  03/11/24   TLSO brace while out of bed  Ibuprofen and lidoderm patch for pain relief   Fall precautions      03/12/24 continue TLSO brace while out of bed  Ibuproen and lidoderm patch    Impaired mobility and ADLs    03/11/24 T 4 compression fracture  TLSO brace while out of bed  PT and OT to evaluate and treat     Acute midline back pain  03/11/24 lidoderm patch and ibuprofen for relief       Primary hypertension  Chronic, controlled. Latest blood pressure and vitals reviewed-     Temp:  [97.4 °F (36.3 °C)-98.2 °F (36.8 °C)]   Pulse:  [57-75]   Resp:  [18-20]   BP: (108-136)/(55-71)   SpO2:  [94 %-98  %] .   Home meds for hypertension were reviewed and noted below.   Hypertension Medications               chlorthalidone (HYGROTEN) 25 MG Tab Take 25 mg by mouth.    metoprolol succinate (TOPROL-XL) 50 MG 24 hr tablet Take 1 tablet by mouth 2 (two) times daily.            While in the hospital, will manage blood pressure as follows; Continue home antihypertensive regimen    Will utilize p.r.n. blood pressure medication only if patient's blood pressure greater than 180/110 and she develops symptoms such as worsening chest pain or shortness of breath.      03/12/24 stable    GERD (gastroesophageal reflux disease)    03/11/24 continue protonix    Poor appetite    03/11/24 improving, ate 100& of breakfast served this morning    Dyslipidemia  03/11/24 continue statin       Depression  Hx of major depression   Controlled - continue sertraline       03/12/24 continue sertraline       Neck pain  03/12/24 complains of left neck pain - would not rank pain on pain scale -states it hurts a lot  Chart review shows imaging on 02/16 and botox injection son 02/19/24. Talked with her re what has releived her pain in past- she states botox has helped, ibuprofen helps.   Added ibuprofen and robaxin prn     The cervical vertebral body heights are maintained. There is minimal anterolisthesis of C3 over C4. There is no evidence of significant translational instability on flexion or extension views. Multilevel degenerative changes including mild multilevel   intervertebral disc space loss and mild marginal osteophytosis. There is multilevel degenerative uncovertebral joint arthropathy bilaterally.     03/13/24 robaxin prn  ultram prn pain      VTE Risk Mitigation (From admission, onward)           Ordered     enoxaparin injection 40 mg  Every 24 hours         03/11/24 1248                    Discharge Planning   MARILEE: 3/28/2024     Code Status: DNR   Is the patient medically ready for discharge?:     Reason for patient still in hospital  (select all that apply): Treatment                     LESLEE Yoo  Department of Hospital Medicine   [facility]

## 2024-03-13 NOTE — CONSULTS
"  [facility]  Adult Nutrition  Consult Note    SUMMARY     Recommendations    Recommendation/Intervention: 1. F/U for intake support and add supplement as needed  Goals: 50-75% meals, wt maintenance  Nutrition Goal Status: new    Nutrition diagnosis:  Decreased intake R/T decline in appetite/acute illness AEB recent dehydration, Intake ~50% since admit       Malnutrition Assessment  Malnutrition Level:  (Based on assessment this pt is not malnourished.)                                    Reason for Assessment    Reason For Assessment: consult (swing bed pt)  Diagnosis:  (recent fall at home, T4 fx, recent dehydration)  Relevant Medical History: Asthma, HTN, HLD  Interdisciplinary Rounds: did not attend  General Information Comments: RDN visited pt this a.m. and pt is somewhat confused.  Dec intake upon admit but intake has improved last several days. She is able to feed herself.  Overall meal intake ~50% since admit.  BSS 17 noted.  last BM 3/10  Nutrition Discharge Planning: New admit to [facility]    Nutrition Risk Screen    Nutrition Risk Screen:  (recent decreased appetite R/T acute illness)    Nutrition/Diet History    Patient Reported Diet/Restrictions/Preferences: general  Spiritual, Cultural Beliefs, Congregation Practices, Values that Affect Care: no  Food Allergies: NKFA  Factors Affecting Nutritional Intake: decreased appetite    Anthropometrics    Temp: 97.7 °F (36.5 °C)  Height: 5' 5" (165.1 cm)  Height (inches): 65 in  Weight Method: Bed Scale  Weight:  (would not let me weight her. stated put the same weight she was when  she came here..)  Weight (lb): 192.1 lb  Ideal Body Weight (IBW), Female: 125 lb  % Ideal Body Weight, Female (lb): 153.68 %  BMI (Calculated): 32  BMI Grade: 30 - 34.9- obesity - grade I       Lab/Procedures/Meds    Pertinent Labs Reviewed: reviewed  Pertinent Labs Comments: Hgb 9.5, Hct 28.9, Alb 2.0  Pertinent Medications Reviewed: reviewed  Pertinent " Medications Comments: Vitamin D, Protonix, Zoloft         Estimated/Assessed Needs    Weight Used For Calorie Calculations: 56.7 kg (125 lb)  Energy Calorie Requirements (kcal): 5067-4169  Energy Need Method: Kcal/kg  Protein Requirements: 57-63  Weight Used For Protein Calculations: 56.7 kg (125 lb)     Estimated Fluid Requirement Method: other (see comments) (1750)  RDA Method (mL): 1418         Nutrition Prescription Ordered    Current Diet Order: Regular  Nutrition Order Comments: 1. F/U for intake support and encourage intake.  Add supplement as needed    Evaluation of Received Nutrient/Fluid Intake    Energy Calories Required: not meeting needs (Intake improving last 2 days)  Protein Required: not meeting needs (Intake improving last 2 days)  Fluid Required: meeting needs  Tolerance: tolerating  % Intake of Estimated Energy Needs: 50 - 75 %  % Meal Intake: 50 - 75 %    Nutrition Risk    Level of Risk/Frequency of Follow-up: low - moderate       Monitor and Evaluation    Food and Nutrient Intake: food and beverage intake  Food and Nutrient Adminstration: diet order  Anthropometric Measurements: weight  Biochemical Data, Medical Tests and Procedures: electrolyte and renal panel  Nutrition-Focused Physical Findings: overall appearance   Intake should improve with recovery.    LEARNING ASSESSMENT  03/11/2024 1357 [facility] (3/11/2024 - Present)  Created by Mila Jensen, RN - RN (Nurse)Status: Complete  PRIMARY LEARNER   Primary Learner Name: Roselia Eldridge KD - 03/11/2024 1357  Relationship: Patient KD - 03/11/2024 1357  Does the primary learner have any barriers to learning?: No Barriers KD - 03/11/2024 1357  What is the preferred language of the primary learner?: English KD - 03/11/2024 1357  Is an  required?: No KD - 03/11/2024 1357  How does the primary learner prefer to learn new concepts?: Listening KD - 03/11/2024 1357  How often do you need to have  someone help you read instructions, pamphlets, or written material from your doctor or pharmacy?: Never KD - 03/11/2024 8857  CO-LEARNER #1   No question answered  CO-LEARNER #2   No question answered  SPECIAL TOPICS   No question answered  ANSWERED BY:   No question answered  Edit History      SDOH:  Physical Activity     On average, how many days per week do you engage in moderate to strenuous exercise (like a brisk walk)?  Patient declined (P)       On average, how many minutes do you engage in exercise at this level?  Patient declined (P)          Financial Resource Strain     How hard is it for you to pay for the very basics like food, housing, medical care, and heating?  Patient declined (P)          Housing Stability     In the last 12 months, was there a time when you were not able to pay the mortgage or rent on time?  Patient declined (P)       In the last 12 months, was there a time when you did not have a steady place to sleep or slept in a shelter (including now)?  Patient declined (P)          Transportation Needs     In the past 12 months, has lack of transportation kept you from medical appointments or from getting medications?  Patient declined (P)       In the past 12 months, has lack of transportation kept you from meetings, work, or from getting things needed for daily living?  Patient declined (P)          Food Insecurity     Within the past 12 months, you worried that your food would run out before you got the money to buy more.  Patient declined (P)       Within the past 12 months, the food you bought just didn't last and you didn't have money to get more.  Patient declined (P)          Stress     Do you feel stress - tense, restless, nervous, or anxious, or unable to sleep at night because your mind is troubled all the time - these days?  Patient declined (P)          Social Connections     In a typical week, how many times do you talk on the phone with family, friends, or neighbors?  Patient  declined (P)       How often do you get together with friends or relatives?  Patient declined (P)       How often do you attend Moravian or Scientologist services?  Patient declined (P)       Do you belong to any clubs or organizations such as Moravian groups, unions, fraternal or athletic groups, or school groups?  Patient declined (P)       How often do you attend meetings of the clubs or organizations you belong to?  Patient declined (P)       Are you , , , , never , or living with a partner?  Patient declined (P)          Alcohol Use     Q1: How often do you have a drink containing alcohol?  Patient declined (P)       Q2: How many drinks containing alcohol do you have on a typical day when you are drinking?  Patient declined (P)       Q3: How often do you have six or more drinks on one occasion?  Patient declined (P)     Follow-Up    RD Follow-up?: Yes

## 2024-03-13 NOTE — ASSESSMENT & PLAN NOTE
03/12/24 complains of left neck pain - would not rank pain on pain scale -states it hurts a lot  Chart review shows imaging on 02/16 and botox injection son 02/19/24. Talked with her re what has releived her pain in past- she states botox has helped, ibuprofen helps.   Added ibuprofen and robaxin prn     The cervical vertebral body heights are maintained. There is minimal anterolisthesis of C3 over C4. There is no evidence of significant translational instability on flexion or extension views. Multilevel degenerative changes including mild multilevel   intervertebral disc space loss and mild marginal osteophytosis. There is multilevel degenerative uncovertebral joint arthropathy bilaterally.     03/13/24 robaxin prn  ultram prn pain

## 2024-03-13 NOTE — ASSESSMENT & PLAN NOTE
03/11/24 t4 compression fracture   TLSO brace  while out of bed   Fall precautions       03/12/24 ambulated 48 feet with therapist yesterday    03/13/24 walked 10 feet twice yesterday

## 2024-03-13 NOTE — PT/OT/SLP PROGRESS
Occupational Therapy   Treatment    Name: Roselia Eldridge  MRN: [id number]  Admitting Diagnosis:  Generalized weakness       Recommendations:     Discharge Recommendations:    Discharge Equipment Recommendations:  walker, rolling  Barriers to discharge:       Assessment:     Roselia Eldridge is a 78 y.o. female with a medical diagnosis of Generalized weakness.  She presents with the following Performance deficits affecting function are weakness, decreased upper extremity function, impaired endurance, impaired balance, decreased safety awareness, impaired self care skills, impaired functional mobility, decreased coordination.     Rehab Prognosis:  Fair; patient would benefit from acute skilled OT services to address these deficits and reach maximum level of function.       Plan:     Patient to be seen 5 x/week to address the above listed problems via self-care/home management, therapeutic activities, therapeutic exercises  Plan of Care Expires:    Plan of Care Reviewed with:      Subjective   Patient was laying supine in bed upon CAMPO arrival. Patient stated she was feeling okay but was having general pain around head and jaw but no specific location but was agreeable to participate in todays session.  Patient/Family Comments/goals: to increase UB strength and endurance in order to return home.   Pain/Comfort:  Pain Rating 1: 5/10 (general pain no specific location at this time)    Objective:     Communicated with: nursing staff prior to session.  Patient found HOB elevated with   upon OT entry to room.    General Precautions: Standard, fall    Orthopedic Precautions:spinal precautions  Braces: TLSO  Respiratory Status: Nasal cannula, flow 2 L/min    Therapeutic exercises:  Patient completed the following exercises to work on UB strength in order to complete ADLs, bed mobility, and transfers with less assistance.    -Bicep curls: 3 sets of 10 with red theraband  -Triceps: 3 sets of 10 with red Theraband  -Rows: 3 sets of 10  with red Theraband    Increased time/effort needed to complete each exercise.   Patient required MOD cues to remain on task due to patient talking to CAMPO and stopping exercises every few reps.     Patient left HOB elevated with call button in reach and RN notified    GOALS:   Multidisciplinary Problems       Occupational Therapy Goals          Problem: Occupational Therapy    Goal Priority Disciplines Outcome Interventions   Occupational Therapy Goal     OT, PT/OT Ongoing, Progressing    Description: STG:  Pt will perform grooming with setup  Pt will bathe with Mod I  Pt will perform UE dressing with Mod I  Pt will perform LE dressing with Mod I  Pt will sit EOB x 15 min with supervisoin assistance  Pt will transfer bed/chair/bsc with Mod I  Pt will perform standing task x 15 min with supervision assistance  Pt will tolerate 30 minutes of tx without fatigue      LT.Restore to max I with self care and mobility.                         Time Tracking:     OT Date of Treatment: 24  OT Start Time: 1300  OT Stop Time: 1318  OT Total Time (min): 18 min    Billable Minutes:Therapeutic Exercise 18 minutes    OT/JAIRON: JAIRON     Number of JAIRON visits since last OT visit: 1    JAIRON Larose  3/13/2024  1:30 PM

## 2024-03-13 NOTE — PT/OT/SLP EVAL
Occupational Therapy   Evaluation    Name: Roselia Eldridge  MRN: [id number]  Admitting Diagnosis: Generalized weakness  Recent Surgery: * No surgery found *      Recommendations:     Discharge Recommendations:    Discharge Equipment Recommendations:     Barriers to discharge:       Assessment:     Roselia Eldridge is a 78 y.o. female with a medical diagnosis of Generalized weakness.  She presents with weakness. Performance deficits affecting function:  decreased self care skills, endurance, strength, gait instability.      Rehab Prognosis: Good; patient would benefit from acute skilled OT services to address these deficits and reach maximum level of function.       Plan:     Patient to be seen 5 x/week to address the above listed problems via self-care/home management, therapeutic activities, therapeutic exercises  Plan of Care Expires:    Plan of Care Reviewed with:      Subjective     Chief Complaint: Pt c/o neck soreness  Patient/Family Comments/goals: return home    Occupational Profile:  Living Environment: Pt lives alone in single story home  Previous level of function: I with all Adls/IADLs   Roles and Routines:   Equipment Used at Home:    Assistance upon Discharge: Pt will not have anyone to assist her at home    Pain/Comfort:       Patients cultural, spiritual, Jew conflicts given the current situation: no    Objective:     Communicated with: Pt prior to session.  Patient found supine with   upon OT entry to room.    General Precautions: Standard, fall  Orthopedic Precautions:    Braces: TLSO  Respiratory Status: Room air    Occupational Performance:    Bed Mobility:        Functional Mobility/Transfers:    Functional Mobility:     Activities of Daily Living:      Cognitive/Visual Perceptual:  Cognitive/Psychosocial Skills:     -       Oriented to: Person, Place, Time, and Situation   -       Follows Commands/attention:Follows two-step commands    Physical Exam:  Upper Extremity Range of Motion:      -       Right Upper Extremity: WFL  -       Left Upper Extremity: WFL  Upper Extremity Strength:    -       Right Upper Extremity: 4/5  -       Left Upper Extremity: 4/5    AMPAC 6 Click ADL:  AMPAC Total Score: 15    Treatment & Education:  Pt educated on scope of OT practice  Pt educated on imporance of OOB activities with supervision    Patient left supine with all lines intact and call button in reach    GOALS:   Multidisciplinary Problems       Occupational Therapy Goals          Problem: Occupational Therapy    Goal Priority Disciplines Outcome Interventions   Occupational Therapy Goal     OT, PT/OT Ongoing, Progressing    Description: STG:  Pt will perform grooming with setup  Pt will bathe with Mod I  Pt will perform UE dressing with Mod I  Pt will perform LE dressing with Mod I  Pt will sit EOB x 15 min with supervisoin assistance  Pt will transfer bed/chair/bsc with Mod I  Pt will perform standing task x 15 min with supervision assistance  Pt will tolerate 30 minutes of tx without fatigue      LT.Restore to max I with self care and mobility.                         History:     Past Medical History:   Diagnosis Date    Asthma     Hypertension     Mixed hyperlipidemia          Past Surgical History:   Procedure Laterality Date    COSMETIC SURGERY         Time Tracking:     OT Date of Treatment:    OT Start Time: 930  OT Stop Time: 945  OT Total Time (min): 15 min    Billable Minutes:Evaluation 15    3/13/2024

## 2024-03-14 LAB
ALBUMIN SERPL BCP-MCNC: 2.1 G/DL (ref 3.5–5)
ALBUMIN/GLOB SERPL: 0.5 {RATIO}
ALP SERPL-CCNC: 99 U/L (ref 55–142)
ALT SERPL W P-5'-P-CCNC: 53 U/L (ref 13–56)
ANION GAP SERPL CALCULATED.3IONS-SCNC: 13 MMOL/L (ref 7–16)
AST SERPL W P-5'-P-CCNC: 62 U/L (ref 15–37)
ATYPICAL LYMPHOCYTES: ABNORMAL
BASOPHILS # BLD AUTO: 0.04 K/UL (ref 0–0.2)
BASOPHILS NFR BLD AUTO: 0.4 % (ref 0–1)
BILIRUB SERPL-MCNC: 0.3 MG/DL (ref ?–1.2)
BUN SERPL-MCNC: 16 MG/DL (ref 7–18)
BUN/CREAT SERPL: 20 (ref 6–20)
CALCIUM SERPL-MCNC: 9.2 MG/DL (ref 8.5–10.1)
CHLORIDE SERPL-SCNC: 101 MMOL/L (ref 98–107)
CO2 SERPL-SCNC: 28 MMOL/L (ref 21–32)
CREAT SERPL-MCNC: 0.8 MG/DL (ref 0.55–1.02)
DIFFERENTIAL METHOD BLD: ABNORMAL
EGFR (NO RACE VARIABLE) (RUSH/TITUS): 76 ML/MIN/1.73M2
EOSINOPHIL # BLD AUTO: 0.29 K/UL (ref 0–0.5)
EOSINOPHIL NFR BLD AUTO: 3.2 % (ref 1–4)
EOSINOPHIL NFR BLD MANUAL: 2 % (ref 1–4)
ERYTHROCYTE [DISTWIDTH] IN BLOOD BY AUTOMATED COUNT: 12.7 % (ref 11.5–14.5)
GLOBULIN SER-MCNC: 4.3 G/DL (ref 2–4)
GLUCOSE SERPL-MCNC: 138 MG/DL (ref 74–106)
HCT VFR BLD AUTO: 29.1 % (ref 38–47)
HGB BLD-MCNC: 9.3 G/DL (ref 12–16)
LYMPHOCYTES # BLD AUTO: 1.77 K/UL (ref 1–4.8)
LYMPHOCYTES NFR BLD AUTO: 19.8 % (ref 27–41)
LYMPHOCYTES NFR BLD MANUAL: 21 % (ref 27–41)
MACROCYTES BLD QL SMEAR: ABNORMAL
MAGNESIUM SERPL-MCNC: 1.6 MG/DL (ref 1.7–2.3)
MCH RBC QN AUTO: 31.2 PG (ref 27–31)
MCHC RBC AUTO-ENTMCNC: 32 G/DL (ref 32–36)
MCV RBC AUTO: 97.7 FL (ref 80–96)
MONOCYTES # BLD AUTO: 0.69 K/UL (ref 0–0.8)
MONOCYTES NFR BLD AUTO: 7.7 % (ref 2–6)
MONOCYTES NFR BLD MANUAL: 7 % (ref 2–6)
MPC BLD CALC-MCNC: 9.2 FL (ref 9.4–12.4)
NEUTROPHILS # BLD AUTO: 6.17 K/UL (ref 1.8–7.7)
NEUTROPHILS NFR BLD AUTO: 68.9 % (ref 53–65)
NEUTS BAND NFR BLD MANUAL: 11 % (ref 1–5)
NEUTS SEG NFR BLD MANUAL: 59 % (ref 50–62)
NRBC BLD MANUAL-RTO: ABNORMAL %
PHOSPHATE SERPL-MCNC: 2.8 MG/DL (ref 2.5–4.5)
PLATELET # BLD AUTO: 274 K/UL (ref 150–400)
PLATELET MORPHOLOGY: NORMAL
POTASSIUM SERPL-SCNC: 3.7 MMOL/L (ref 3.5–5.1)
PROT SERPL-MCNC: 6.4 G/DL (ref 6.4–8.2)
RBC # BLD AUTO: 2.98 M/UL (ref 4.2–5.4)
REACTIVE LYMPHOCYTES: ABNORMAL
SODIUM SERPL-SCNC: 138 MMOL/L (ref 136–145)
WBC # BLD AUTO: 8.96 K/UL (ref 4.5–11)

## 2024-03-14 PROCEDURE — 97530 THERAPEUTIC ACTIVITIES: CPT | Mod: CQ

## 2024-03-14 PROCEDURE — 83735 ASSAY OF MAGNESIUM: CPT | Performed by: NURSE PRACTITIONER

## 2024-03-14 PROCEDURE — 80053 COMPREHEN METABOLIC PANEL: CPT | Performed by: NURSE PRACTITIONER

## 2024-03-14 PROCEDURE — 27000982 HC MATTRESS, MATRIX LAL RENTAL

## 2024-03-14 PROCEDURE — 63600175 PHARM REV CODE 636 W HCPCS: Performed by: NURSE PRACTITIONER

## 2024-03-14 PROCEDURE — 99900035 HC TECH TIME PER 15 MIN (STAT)

## 2024-03-14 PROCEDURE — 11000004 HC SNF PRIVATE

## 2024-03-14 PROCEDURE — 25000003 PHARM REV CODE 250: Performed by: FAMILY MEDICINE

## 2024-03-14 PROCEDURE — 97110 THERAPEUTIC EXERCISES: CPT | Mod: CQ

## 2024-03-14 PROCEDURE — 25000003 PHARM REV CODE 250: Performed by: NURSE PRACTITIONER

## 2024-03-14 PROCEDURE — 97530 THERAPEUTIC ACTIVITIES: CPT | Mod: CO

## 2024-03-14 PROCEDURE — 85025 COMPLETE CBC W/AUTO DIFF WBC: CPT | Performed by: NURSE PRACTITIONER

## 2024-03-14 PROCEDURE — 27000944

## 2024-03-14 PROCEDURE — 84100 ASSAY OF PHOSPHORUS: CPT | Performed by: NURSE PRACTITIONER

## 2024-03-14 PROCEDURE — 27000221 HC OXYGEN, UP TO 24 HOURS

## 2024-03-14 PROCEDURE — 94761 N-INVAS EAR/PLS OXIMETRY MLT: CPT

## 2024-03-14 PROCEDURE — 99308 SBSQ NF CARE LOW MDM 20: CPT | Mod: ,,, | Performed by: NURSE PRACTITIONER

## 2024-03-14 RX ORDER — NAPROXEN 250 MG/1
500 TABLET ORAL 2 TIMES DAILY PRN
Status: DISCONTINUED | OUTPATIENT
Start: 2024-03-14 | End: 2024-03-22 | Stop reason: HOSPADM

## 2024-03-14 RX ORDER — DICLOFENAC SODIUM 10 MG/G
2 GEL TOPICAL 2 TIMES DAILY
Status: DISCONTINUED | OUTPATIENT
Start: 2024-03-14 | End: 2024-03-14

## 2024-03-14 RX ORDER — LANOLIN ALCOHOL/MO/W.PET/CERES
400 CREAM (GRAM) TOPICAL DAILY
Status: DISCONTINUED | OUTPATIENT
Start: 2024-03-14 | End: 2024-03-22 | Stop reason: HOSPADM

## 2024-03-14 RX ADMIN — Medication 5000 UNITS: at 08:03

## 2024-03-14 RX ADMIN — SENNOSIDES AND DOCUSATE SODIUM 1 TABLET: 8.6; 5 TABLET ORAL at 08:03

## 2024-03-14 RX ADMIN — OXYBUTYNIN CHLORIDE 10 MG: 5 TABLET, EXTENDED RELEASE ORAL at 08:03

## 2024-03-14 RX ADMIN — METHOCARBAMOL 500 MG: 500 TABLET ORAL at 03:03

## 2024-03-14 RX ADMIN — Medication 400 MG: at 08:03

## 2024-03-14 RX ADMIN — METHOCARBAMOL 500 MG: 500 TABLET ORAL at 08:03

## 2024-03-14 RX ADMIN — SERTRALINE HYDROCHLORIDE 100 MG: 50 TABLET ORAL at 08:03

## 2024-03-14 RX ADMIN — METOPROLOL SUCCINATE 50 MG: 50 TABLET, EXTENDED RELEASE ORAL at 08:03

## 2024-03-14 RX ADMIN — TRAMADOL HYDROCHLORIDE 50 MG: 50 TABLET, COATED ORAL at 04:03

## 2024-03-14 RX ADMIN — PANTOPRAZOLE SODIUM 40 MG: 40 TABLET, DELAYED RELEASE ORAL at 08:03

## 2024-03-14 RX ADMIN — ENOXAPARIN SODIUM 40 MG: 40 INJECTION SUBCUTANEOUS at 04:03

## 2024-03-14 RX ADMIN — MELATONIN TAB 3 MG 6 MG: 3 TAB at 08:03

## 2024-03-14 RX ADMIN — LIDOCAINE 1 PATCH: 50 PATCH CUTANEOUS at 12:03

## 2024-03-14 RX ADMIN — NAPROXEN 500 MG: 250 TABLET ORAL at 09:03

## 2024-03-14 NOTE — PROGRESS NOTES
[facility] - Medical Surgical Unit  Hospital Medicine  Progress Note    Patient Name: Roselia Eldridge  MRN: [id number]  Patient Class: IP- Swing   Admission Date: 3/11/2024  Length of Stay: 3 days  Attending Physician: Galindo De Jesus IV, DO  Primary Care Provider: Pamela Jurado DO        Subjective:     Principal Problem:Generalized weakness        HPI:  Roselia Eldridge is a 78 year old female with pmh cervical spondylosis, primary hypertension, OAB, mixed hyperlipidemia, depression, insomnia, GERD, CKD, CAD and vitamin D deficiency.   She was admitted to inpatient on 03/06/24 for thoracic pain , right rib pain after fall at home and dehydration. She was given IV fluids and norco for pain. She began to have visual hallucinations after norco. Norco was dcd and back pain was treated with lidoderm patch and ibuprofen.  Denies any hallucinations since Saturday. She continues to have impaired mobility due to T4 compression fracture. She has TLSO brace that she wears while out of bed. States ibuprofen is working well for her back pain. She is agreeable with swing bed for continued therapy. She is wet this morning, hesitant to let nurses clean her- encouraged her to let nurses/ CNAs clean her so her skin will not become irritated and break down- states she is not a child, she will not lay in it that long. She is alert and oriented x 3.     Overview/Hospital Course:  03/12/24 Called to room to check on Mrs. Eldridge- nurse told me that she had just woke up and that she is not talking to her. On my arrival, she is grunting. Asked her to talk to me and she is alert and responsive. She is oriented x 3. She tells me her neck hurts- states she has history of DDD. Asked her to rank pain on scale 1- 10 and she says it hurts a lot. Asked her what usually helps with her pain, states she has gotten botox injections in the past. Asked her if ibuproen has helped and she states it has. Will get dose of ibuprofen and monitor.  She is able to follow all commands, has equal strength bilaterally.  Review of records show imaging on 02/16/24.-The cervical vertebral body heights are maintained. There is minimal anterolisthesis of C3 over C4. There is no evidence of significant translational instability on flexion or extension views. Multilevel degenerative changes including mild multilevel   intervertebral disc space loss and mild marginal osteophytosis. There is multilevel degenerative uncovertebral joint arthropathy bilaterally. Also shows botox injections to neck on 02/19/24.   Consulted with Dr. De Jesus re neck pain.     03/13/24 Awake and resting in bed. Complains of neck pain - states she always has this pain but that robaxin and ultram have helped. Requesting robaxin this morning. Talked with her re therapy - States she was able to walk some yesterday- records show she walked 10 feet twice. Encouraged her to continue to participate and do more as she is able.  She has o2 per nc - does not use this at home - will wean to room air as she tolerates. Ate 100% of breakfast served.      03/14/24 Awake and resting in bed. Complains of pain at base of neck with radiation of pain to shoulders. States she took baclofen at hs while at home and that helped. She is on robaxin now. Had increase in AST/ALT  a few days ago . Statin and ibuprofen dcd. Now normal. She is requesting motrin for neck pain. Will order BID prn and watch labs. Complains of dry eyes. States she uses artificial tears at home - ordered this for her. Continue therapy for strengthening.    Interval History: neck pain     Review of Systems   Constitutional:  Negative for appetite change and fatigue.   HENT:  Negative for sinus pressure, sinus pain and trouble swallowing.    Eyes:  Negative for discharge and itching.   Respiratory:  Negative for cough.    Cardiovascular:  Negative for chest pain and palpitations.        Reports history of palpitations   Gastrointestinal:  Negative for  constipation, diarrhea, nausea and vomiting.   Genitourinary:  Negative for difficulty urinating and dysuria.        OAB    Musculoskeletal:  Positive for arthralgias. Negative for back pain and joint swelling.        Complains of neck pain -    Skin:  Negative for color change.   Neurological:  Positive for weakness. Negative for light-headedness and headaches.   Psychiatric/Behavioral:  Negative for hallucinations.         History of major depression   On sertraline      Objective:     Vital Signs (Most Recent):  Temp: 99 °F (37.2 °C) (03/14/24 0734)  Pulse: 71 (03/14/24 0734)  Resp: 18 (03/14/24 0407)  BP: 138/79 (03/14/24 0809)  SpO2: (!) 94 % (03/14/24 0734) Vital Signs (24h Range):  Temp:  [97.6 °F (36.4 °C)-99 °F (37.2 °C)] 99 °F (37.2 °C)  Pulse:  [71-73] 71  Resp:  [18-20] 18  SpO2:  [92 %-95 %] 94 %  BP: (112-138)/(68-79) 138/79     Weight:  (would not let me weight her. stated put the same weight she was when  she came here..)  Body mass index is 31.97 kg/m².    Intake/Output Summary (Last 24 hours) at 3/14/2024 0903  Last data filed at 3/13/2024 1830  Gross per 24 hour   Intake 480 ml   Output --   Net 480 ml         Physical Exam  HENT:      Head: Normocephalic.      Nose: Nose normal.      Mouth/Throat:      Mouth: Mucous membranes are moist.   Eyes:      Pupils: Pupils are equal, round, and reactive to light.   Neck:     Cardiovascular:      Rate and Rhythm: Normal rate and regular rhythm.      Pulses: Normal pulses.      Heart sounds: Normal heart sounds.   Pulmonary:      Effort: Pulmonary effort is normal. No respiratory distress.      Breath sounds: Normal breath sounds. No stridor. No wheezing or rhonchi.   Abdominal:      General: Bowel sounds are normal. There is no distension.      Palpations: Abdomen is soft. There is no mass.      Tenderness: There is no abdominal tenderness. There is no guarding or rebound.      Hernia: No hernia is present.   Musculoskeletal:         General: No swelling,  tenderness or signs of injury.      Cervical back: Muscular tenderness present.      Right lower leg: No edema.      Left lower leg: No edema.      Comments: Complains of neck pain -    Skin:     General: Skin is warm and dry.   Neurological:      Mental Status: She is alert and oriented to person, place, and time.      Motor: Weakness present.             Significant Labs: All pertinent labs within the past 24 hours have been reviewed.  Recent Lab Results         03/14/24  0555        Albumin/Globulin Ratio 0.5       Albumin 2.1       ALP 99       ALT 53       Anion Gap 13       AST 62  Comment: Verified by  repeat testing.       Atypical Lymphocytes Rare       Bands 11       Baso # 0.04       Basophil % 0.4       BILIRUBIN TOTAL 0.3       BUN 16       BUN/CREAT RATIO 20       Calcium 9.2       Chloride 101       CO2 28       Creatinine 0.80       Differential Method Manual       eGFR 76       Eos # 0.29       Eos % 3.2        2       Globulin, Total 4.3       Glucose 138       Hematocrit 29.1       Hemoglobin 9.3       Lymph # 1.77       Lymph % 19.8        21       Macrocytosis --  Comment: Slight         Magnesium  1.6       MCH 31.2       MCHC 32.0       MCV 97.7       Mono # 0.69       Mono % 7.7        7       MPV 9.2       Neutrophils, Abs 6.17       Neutrophils Relative 68.9       nRBC       Phosphorus Level 2.8       PLATELET MORPHOLOGY Normal       Platelet Count 274       Potassium 3.7       PROTEIN TOTAL 6.4       RBC 2.98       RDW 12.7       Reactive Lymphocytes Rare       Segmented Neutrophils, Man % 59       Sodium 138       WBC 8.96               Significant Imaging: I have reviewed all pertinent imaging results/findings within the past 24 hours.    Assessment/Plan:      * Generalized weakness    03/11/24 t4 compression fracture   TLSO brace  while out of bed   Fall precautions       03/12/24 ambulated 48 feet with therapist yesterday    03/13/24 walked 10 feet twice yesterday    03/14/24 continue  therapy    Thoracic compression fracture, sequela  03/11/24   TLSO brace while out of bed  Ibuprofen and lidoderm patch for pain relief   Fall precautions      03/12/24 continue TLSO brace while out of bed  Ibuproen and lidoderm patch    03/14/24 TLSO brace while out of bed     Impaired mobility and ADLs    03/11/24 T 4 compression fracture  TLSO brace while out of bed  PT and OT to evaluate and treat     Acute midline back pain  03/11/24 lidoderm patch and ibuprofen for relief       Primary hypertension  Chronic, controlled. Latest blood pressure and vitals reviewed-     Temp:  [97.4 °F (36.3 °C)-98.2 °F (36.8 °C)]   Pulse:  [57-75]   Resp:  [18-20]   BP: (108-136)/(55-71)   SpO2:  [94 %-98 %] .   Home meds for hypertension were reviewed and noted below.   Hypertension Medications               chlorthalidone (HYGROTEN) 25 MG Tab Take 25 mg by mouth.    metoprolol succinate (TOPROL-XL) 50 MG 24 hr tablet Take 1 tablet by mouth 2 (two) times daily.            While in the hospital, will manage blood pressure as follows; Continue home antihypertensive regimen    Will utilize p.r.n. blood pressure medication only if patient's blood pressure greater than 180/110 and she develops symptoms such as worsening chest pain or shortness of breath.      03/12/24 stable    GERD (gastroesophageal reflux disease)    03/11/24 continue protonix    Poor appetite    03/11/24 improving, ate 100& of breakfast served this morning    Dyslipidemia  03/11/24 continue statin       Depression  Hx of major depression   Controlled - continue sertraline       03/12/24 continue sertraline       Neck pain  03/12/24 complains of left neck pain - would not rank pain on pain scale -states it hurts a lot  Chart review shows imaging on 02/16 and botox injection son 02/19/24. Talked with her re what has releived her pain in past- she states botox has helped, ibuprofen helps.   Added ibuprofen and robaxin prn     The cervical vertebral body heights are  maintained. There is minimal anterolisthesis of C3 over C4. There is no evidence of significant translational instability on flexion or extension views. Multilevel degenerative changes including mild multilevel   intervertebral disc space loss and mild marginal osteophytosis. There is multilevel degenerative uncovertebral joint arthropathy bilaterally.     03/13/24 robaxin prn  ultram prn pain      VTE Risk Mitigation (From admission, onward)           Ordered     enoxaparin injection 40 mg  Every 24 hours         03/11/24 1248                    Discharge Planning   MARILEE: 3/28/2024     Code Status: DNR   Is the patient medically ready for discharge?:     Reason for patient still in hospital (select all that apply): Treatment                     LESLEE Yoo  Department of Hospital Medicine   [facility] - Medical Surgical Unit

## 2024-03-14 NOTE — ASSESSMENT & PLAN NOTE
03/11/24 t4 compression fracture   TLSO brace  while out of bed   Fall precautions       03/12/24 ambulated 48 feet with therapist yesterday    03/13/24 walked 10 feet twice yesterday    03/14/24 continue therapy

## 2024-03-14 NOTE — PT/OT/SLP PROGRESS
Occupational Therapy   Treatment    Name: Roselia Eldridge  MRN: [id number]  Admitting Diagnosis:  Generalized weakness       Recommendations:     Discharge Recommendations:    Discharge Equipment Recommendations:  walker, rolling  Barriers to discharge:       Assessment:     Roselia Eldridge is a 78 y.o. female with a medical diagnosis of Generalized weakness.  Performance deficits affecting function are weakness, impaired endurance, impaired self care skills.     Rehab Prognosis:  Fair; patient would benefit from acute skilled OT services to address these deficits and reach maximum level of function.       Plan:     Patient to be seen 5 x/week to address the above listed problems via self-care/home management, therapeutic activities, therapeutic exercises  Plan of Care Expires:    Plan of Care Reviewed with:      Subjective     Chief Complaint:   Patient/Family Comments/goals:   Pain/Comfort:       Objective:     Communicated with: nurse prior to session.  Patient found supine with   upon OT entry to room.    General Precautions: Standard, fall    Orthopedic Precautions:spinal precautions  Braces: TLSO  Respiratory Status: Room air     Occupational Performance:     Bed Mobility:         Functional Mobility/Transfers:    Functional Mobility:     Activities of Daily Living:  Washed her face Latoya with access to wash rag  I to apply lip balm to her lips      AMPAC 6 Click ADL:      Treatment & Education:  Pt performed red t band 15 reps x 2 B elbow flex/ext  Pt drowsy and required v/c's to remain on task.  Sup visit performed via phone with Bailey Larson OT  Patient left supine with all lines intact and call button in reach    GOALS:   Multidisciplinary Problems       Occupational Therapy Goals          Problem: Occupational Therapy    Goal Priority Disciplines Outcome Interventions   Occupational Therapy Goal     OT, PT/OT Ongoing, Progressing    Description: STG:  Pt will perform grooming with setup  Pt will bathe with Mod  I  Pt will perform UE dressing with Mod I  Pt will perform LE dressing with Mod I  Pt will sit EOB x 15 min with supervisoin assistance  Pt will transfer bed/chair/bsc with Mod I  Pt will perform standing task x 15 min with supervision assistance  Pt will tolerate 30 minutes of tx without fatigue      LT.Restore to max I with self care and mobility.                         Time Tracking:     OT Date of Treatment: 24  OT Start Time: 1038  OT Stop Time: 1050  OT Total Time (min): 12 min    Billable Minutes:Therapeutic Activity 10    OT/JAIRON: JAIRON     Number of JAIRON visits since last OT visit: 1    3/14/2024

## 2024-03-14 NOTE — PT/OT/SLP PROGRESS
"Physical Therapy Treatment    Patient Name:  Roselia Eldridge   MRN:  [id number]    Recommendations:     Discharge Recommendations: Low Intensity Therapy  Discharge Equipment Recommendations: walker, rolling  Barriers to discharge: Decreased caregiver support    Assessment:     Roselia Eldridge is a 78 y.o. female admitted with a medical diagnosis of Generalized weakness.  She presents with the following impairments/functional limitations: weakness, impaired endurance, impaired self care skills, impaired functional mobility, gait instability, impaired balance, decreased lower extremity function, decreased safety awareness, pain Upon arrival into room, patient was found to be sitting on the toilet without her TLSO on. Therapist tried to motivate patient in performing more ambulation in today's therapy session, but patient refused due to being "unable to hold my head up". Therapist then assisted patient back to bed where she then stood up from the toilet and began to lean backwards saying "you got to push me up". Therapist explained to patient that she needs to try to pull herself up in order to regain her strength. Patein was then able to stand up straight with no assistance needed and ambulated back to bed. Once patient was sitting on the edge of the bed, the therapist asked patient to try and lay herself down in order for the therapist to see how well she can do. Patient would only lean shoulders over, but refused to move her legs to lift them into the bed. Therapist once more asked patient to attempt to lift legs in the bed, but patient became agitated saying that she was trying despite no muscle contractions noted in lower extremities. Patient then told therapist to lift her legs because she was unable to do so sine she has to hold her head up with her hands. Patient was maximum assistance to perform sit to supine due to being unwilling to help therapist. Once patient was supine, therapist asked patient to perform some " "lower extremity exercise. Patient reported she was unable to do so until her head was "supported" despite it already laying on a pillow. Patient was finally able to perform exercises where she performed them very slowly while moaning in pain the entire time. Patient performed 3 exercises before refusing further exercises.     Rehab Prognosis: Fair; patient would benefit from acute skilled PT services to address these deficits and reach maximum level of function.    Recent Surgery: * No surgery found *      Plan:     During this hospitalization, patient to be seen 5 x/week to address the identified rehab impairments via gait training, therapeutic activities, therapeutic exercises, neuromuscular re-education and progress toward the following goals:    Plan of Care Expires:  03/28/24    Subjective     Chief Complaint: weakness, pain  Patient/Family Comments/goals: return home   Pain/Comfort:  Pain Rating 1:  (Patient reported pain in her neck, but would not rate pain level.)      Objective:     Communicated with PT prior to session.  Patient found  sitting on toilet without her TLSO on  with   upon PT entry to room.     General Precautions: Standard, fall  Orthopedic Precautions:    Braces: TLSO  Respiratory Status: Nasal cannula, flow 2 L/min      Functional Mobility:  Bed Mobility:     Sit to Supine: maximal assistance  Transfers:     Sit to Stand:  minimum assistance with rolling walker  Toilet Transfer: minimum assistance with  rolling walker  using  Step Transfer  Gait: Patient ambulated 8' with RW and CGA. Therapist attempted multiple times to get patient to ambulate further, but patient stated, "I can not hold my head up and walk at the same time". Patient refused further ambulation.      AM-PAC 6 CLICK MOBILITY  Turning over in bed (including adjusting bedclothes, sheets and blankets)?: 2  Sitting down on and standing up from a chair with arms (e.g., wheelchair, bedside commode, etc.): 3  Moving from lying on " back to sitting on the side of the bed?: 2  Moving to and from a bed to a chair (including a wheelchair)?: 3  Need to walk in hospital room?: 3  Climbing 3-5 steps with a railing?: 2  Basic Mobility Total Score: 15       Treatment & Education:  Transfers and ambulation as listed above.  Hip abduction - 10 reps   Hip adduction - 10 reps   Straight leg raises - 10 reps   Patient performed all exercises slowly with extended rest breaks between each rep of each exercise. Patient was only willing to perform one exercises, but therapist was able to get patent to perform more. Patient moaned in pain from her neck there entire therapy session.    Patient left HOB elevated with call button in reach and lunch tray placed in front of patient .    GOALS:   Multidisciplinary Problems       Physical Therapy Goals          Problem: Physical Therapy    Goal Priority Disciplines Outcome Goal Variances Interventions   Physical Therapy Goal     PT, PT/OT Ongoing, Progressing     Description: Short-term Goals: 1.5 weeks  1. Patient will perform supine to/from sit with minimal assistance x 2 people to improve independence and safety with bed mobility.  2. Patient will perform sit to/from stand with a rolling walker with minimal assistance to improve independence and safety with transfers.  3. Patient will ambulate 150 feet with a rolling walker with contact guard assist to improve independence and safety with gait.  4. Patient will tolerate 15 minutes of physical therapy intervention to improve endurance and activity tolerance.    Long-term goals: 3 weeks  1. Patient will perform supine to/from sit with minimal assistance to improve independence and safety with bed mobility.  2. Patient will perform sit to/from stand with a rolling walker with contact guard assist to improve independence and safety with transfers.  3. Patient will ambulate 300 feet with a rolling walker with standby assist to improve independence and safety with  gait.  4. Patient will tolerate 30 minutes of physical therapy intervention to improve endurance and activity tolerance.                         Time Tracking:     PT Received On: 03/14/24  PT Start Time: 1114     PT Stop Time: 1142  PT Total Time (min): 28 min     Billable Minutes: Therapeutic Activity 14 and Therapeutic Exercise 14    Treatment Type: Treatment  PT/PTA: PTA     Number of PTA visits since last PT visit: 3   ROCHELLE Velásquez   03/14/2024

## 2024-03-14 NOTE — SUBJECTIVE & OBJECTIVE
Interval History: neck pain     Review of Systems   Constitutional:  Negative for appetite change and fatigue.   HENT:  Negative for sinus pressure, sinus pain and trouble swallowing.    Eyes:  Negative for discharge and itching.   Respiratory:  Negative for cough.    Cardiovascular:  Negative for chest pain and palpitations.        Reports history of palpitations   Gastrointestinal:  Negative for constipation, diarrhea, nausea and vomiting.   Genitourinary:  Negative for difficulty urinating and dysuria.        OAB    Musculoskeletal:  Positive for arthralgias. Negative for back pain and joint swelling.        Complains of neck pain -    Skin:  Negative for color change.   Neurological:  Positive for weakness. Negative for light-headedness and headaches.   Psychiatric/Behavioral:  Negative for hallucinations.         History of major depression   On sertraline      Objective:     Vital Signs (Most Recent):  Temp: 99 °F (37.2 °C) (03/14/24 0734)  Pulse: 71 (03/14/24 0734)  Resp: 18 (03/14/24 0407)  BP: 138/79 (03/14/24 0809)  SpO2: (!) 94 % (03/14/24 0734) Vital Signs (24h Range):  Temp:  [97.6 °F (36.4 °C)-99 °F (37.2 °C)] 99 °F (37.2 °C)  Pulse:  [71-73] 71  Resp:  [18-20] 18  SpO2:  [92 %-95 %] 94 %  BP: (112-138)/(68-79) 138/79     Weight:  (would not let me weight her. stated put the same weight she was when  she came here..)  Body mass index is 31.97 kg/m².    Intake/Output Summary (Last 24 hours) at 3/14/2024 0903  Last data filed at 3/13/2024 1830  Gross per 24 hour   Intake 480 ml   Output --   Net 480 ml         Physical Exam  HENT:      Head: Normocephalic.      Nose: Nose normal.      Mouth/Throat:      Mouth: Mucous membranes are moist.   Eyes:      Pupils: Pupils are equal, round, and reactive to light.   Neck:     Cardiovascular:      Rate and Rhythm: Normal rate and regular rhythm.      Pulses: Normal pulses.      Heart sounds: Normal heart sounds.   Pulmonary:      Effort: Pulmonary effort is  normal. No respiratory distress.      Breath sounds: Normal breath sounds. No stridor. No wheezing or rhonchi.   Abdominal:      General: Bowel sounds are normal. There is no distension.      Palpations: Abdomen is soft. There is no mass.      Tenderness: There is no abdominal tenderness. There is no guarding or rebound.      Hernia: No hernia is present.   Musculoskeletal:         General: No swelling, tenderness or signs of injury.      Cervical back: Muscular tenderness present.      Right lower leg: No edema.      Left lower leg: No edema.      Comments: Complains of neck pain -    Skin:     General: Skin is warm and dry.   Neurological:      Mental Status: She is alert and oriented to person, place, and time.      Motor: Weakness present.             Significant Labs: All pertinent labs within the past 24 hours have been reviewed.  Recent Lab Results         03/14/24  0555        Albumin/Globulin Ratio 0.5       Albumin 2.1       ALP 99       ALT 53       Anion Gap 13       AST 62  Comment: Verified by  repeat testing.       Atypical Lymphocytes Rare       Bands 11       Baso # 0.04       Basophil % 0.4       BILIRUBIN TOTAL 0.3       BUN 16       BUN/CREAT RATIO 20       Calcium 9.2       Chloride 101       CO2 28       Creatinine 0.80       Differential Method Manual       eGFR 76       Eos # 0.29       Eos % 3.2        2       Globulin, Total 4.3       Glucose 138       Hematocrit 29.1       Hemoglobin 9.3       Lymph # 1.77       Lymph % 19.8        21       Macrocytosis --  Comment: Slight         Magnesium  1.6       MCH 31.2       MCHC 32.0       MCV 97.7       Mono # 0.69       Mono % 7.7        7       MPV 9.2       Neutrophils, Abs 6.17       Neutrophils Relative 68.9       nRBC       Phosphorus Level 2.8       PLATELET MORPHOLOGY Normal       Platelet Count 274       Potassium 3.7       PROTEIN TOTAL 6.4       RBC 2.98       RDW 12.7       Reactive Lymphocytes Rare       Segmented Neutrophils, Man %  59       Sodium 138       WBC 8.96               Significant Imaging: I have reviewed all pertinent imaging results/findings within the past 24 hours.

## 2024-03-14 NOTE — ASSESSMENT & PLAN NOTE
03/11/24   TLSO brace while out of bed  Ibuprofen and lidoderm patch for pain relief   Fall precautions      03/12/24 continue TLSO brace while out of bed  Ibuproen and lidoderm patch    03/14/24 TLSO brace while out of bed

## 2024-03-14 NOTE — PLAN OF CARE
[facility] - Medical Surgical Unit  Discharge Assessment    Primary Care Provider: Pamela Jurado,      Discharge Assessment (most recent)       BRIEF DISCHARGE ASSESSMENT - 03/14/24 1232          Discharge Planning    Assessment Type Discharge Planning Brief Assessment     Resource/Environmental Concerns none (P)                    Talked with Ms. Eldridge earlier this morning about discharge plans.  She intends to return home with [facility]

## 2024-03-15 PROCEDURE — 11000004 HC SNF PRIVATE

## 2024-03-15 PROCEDURE — 27000982 HC MATTRESS, MATRIX LAL RENTAL

## 2024-03-15 PROCEDURE — 99309 SBSQ NF CARE MODERATE MDM 30: CPT | Mod: ,,, | Performed by: NURSE PRACTITIONER

## 2024-03-15 PROCEDURE — 63600175 PHARM REV CODE 636 W HCPCS: Performed by: NURSE PRACTITIONER

## 2024-03-15 PROCEDURE — A6212 FOAM DRG <=16 SQ IN W/BORDER: HCPCS

## 2024-03-15 PROCEDURE — 27000221 HC OXYGEN, UP TO 24 HOURS

## 2024-03-15 PROCEDURE — 97110 THERAPEUTIC EXERCISES: CPT | Mod: CQ

## 2024-03-15 PROCEDURE — 25000003 PHARM REV CODE 250: Performed by: NURSE PRACTITIONER

## 2024-03-15 PROCEDURE — 25000003 PHARM REV CODE 250: Performed by: FAMILY MEDICINE

## 2024-03-15 PROCEDURE — 97110 THERAPEUTIC EXERCISES: CPT

## 2024-03-15 PROCEDURE — 94761 N-INVAS EAR/PLS OXIMETRY MLT: CPT

## 2024-03-15 PROCEDURE — 97116 GAIT TRAINING THERAPY: CPT | Mod: CQ

## 2024-03-15 PROCEDURE — 27000944

## 2024-03-15 PROCEDURE — 99900035 HC TECH TIME PER 15 MIN (STAT)

## 2024-03-15 RX ADMIN — METOPROLOL SUCCINATE 50 MG: 50 TABLET, EXTENDED RELEASE ORAL at 08:03

## 2024-03-15 RX ADMIN — OXYBUTYNIN CHLORIDE 10 MG: 5 TABLET, EXTENDED RELEASE ORAL at 08:03

## 2024-03-15 RX ADMIN — ENOXAPARIN SODIUM 40 MG: 40 INJECTION SUBCUTANEOUS at 04:03

## 2024-03-15 RX ADMIN — ASPIRIN 325 MG ORAL TABLET 325 MG: 325 PILL ORAL at 08:03

## 2024-03-15 RX ADMIN — Medication 400 MG: at 08:03

## 2024-03-15 RX ADMIN — GLYCERIN 1 DROP: .002; .002; .01 SOLUTION/ DROPS OPHTHALMIC at 08:03

## 2024-03-15 RX ADMIN — PANTOPRAZOLE SODIUM 40 MG: 40 TABLET, DELAYED RELEASE ORAL at 08:03

## 2024-03-15 RX ADMIN — SENNOSIDES AND DOCUSATE SODIUM 1 TABLET: 8.6; 5 TABLET ORAL at 08:03

## 2024-03-15 RX ADMIN — NAPROXEN 500 MG: 250 TABLET ORAL at 08:03

## 2024-03-15 RX ADMIN — METHOCARBAMOL 500 MG: 500 TABLET ORAL at 12:03

## 2024-03-15 RX ADMIN — Medication 5000 UNITS: at 08:03

## 2024-03-15 RX ADMIN — SERTRALINE HYDROCHLORIDE 100 MG: 50 TABLET ORAL at 08:03

## 2024-03-15 RX ADMIN — MELATONIN TAB 3 MG 6 MG: 3 TAB at 08:03

## 2024-03-15 RX ADMIN — LIDOCAINE 1 PATCH: 50 PATCH CUTANEOUS at 12:03

## 2024-03-15 NOTE — ASSESSMENT & PLAN NOTE
03/11/24 improving, ate 100% of breakfast served this morning    03/15/24 reports good appetite this morning

## 2024-03-15 NOTE — ASSESSMENT & PLAN NOTE
03/11/24 t4 compression fracture   TLSO brace  while out of bed   Fall precautions       03/12/24 ambulated 48 feet with therapist yesterday    03/13/24 walked 10 feet twice yesterday    03/14/24 continue therapy    03/15/24 continue therapy, encouraged Mrs. Eldridge to put more effort in to therapy- to participate an full effort

## 2024-03-15 NOTE — PLAN OF CARE
Problem: Adult Inpatient Plan of Care  Goal: Patient-Specific Goal (Individualized)  Outcome: Ongoing, Progressing     Problem: Fall Injury Risk  Goal: Absence of Fall and Fall-Related Injury  Outcome: Ongoing, Progressing  Intervention: Promote Injury-Free Environment  Flowsheets (Taken 3/15/2024 0115)  Safety Promotion/Fall Prevention:   assistive device/personal item within reach   side rails raised x 2   supervised activity   instructed to call staff for mobility

## 2024-03-15 NOTE — SUBJECTIVE & OBJECTIVE
Interval History: weakness, neck pain    Review of Systems   Constitutional:  Negative for appetite change and fatigue.   HENT:  Negative for sinus pressure, sinus pain and trouble swallowing.    Eyes:  Negative for discharge and itching.   Respiratory:  Negative for cough.    Cardiovascular:  Negative for chest pain and palpitations.   Gastrointestinal:  Negative for constipation, diarrhea, nausea and vomiting.   Genitourinary:  Negative for difficulty urinating and dysuria.        OAB    Musculoskeletal:  Positive for arthralgias. Negative for back pain and joint swelling.        Complains of neck pain -    Skin:  Negative for color change.   Neurological:  Positive for weakness. Negative for light-headedness and headaches.   Psychiatric/Behavioral:  Negative for hallucinations.         History of major depression   On sertraline      Objective:     Vital Signs (Most Recent):  Temp: 97.8 °F (36.6 °C) (03/15/24 0709)  Pulse: 62 (03/15/24 0709)  Resp: 18 (03/15/24 0709)  BP: 133/72 (03/15/24 0709)  SpO2: (!) 94 % (03/15/24 0709) Vital Signs (24h Range):  Temp:  [97.8 °F (36.6 °C)] 97.8 °F (36.6 °C)  Pulse:  [62-72] 62  Resp:  [16-18] 18  SpO2:  [93 %-94 %] 94 %  BP: (107-138)/(65-79) 133/72     Weight:  (would not let me weight her. stated put the same weight she was when  she came here..)  Body mass index is 31.97 kg/m².    Intake/Output Summary (Last 24 hours) at 3/15/2024 0755  Last data filed at 3/14/2024 2324  Gross per 24 hour   Intake 800 ml   Output --   Net 800 ml         Physical Exam  HENT:      Head: Normocephalic.      Nose: Nose normal.      Mouth/Throat:      Mouth: Mucous membranes are moist.   Eyes:      Pupils: Pupils are equal, round, and reactive to light.   Neck:     Cardiovascular:      Rate and Rhythm: Normal rate and regular rhythm.      Pulses: Normal pulses.      Heart sounds: Normal heart sounds.   Pulmonary:      Effort: Pulmonary effort is normal. No respiratory distress.      Breath  sounds: Normal breath sounds. No stridor. No wheezing or rhonchi.   Abdominal:      General: Bowel sounds are normal. There is no distension.      Palpations: Abdomen is soft. There is no mass.      Tenderness: There is no abdominal tenderness. There is no guarding or rebound.      Hernia: No hernia is present.   Musculoskeletal:         General: No swelling, tenderness or signs of injury.      Cervical back: Muscular tenderness present.      Right lower leg: No edema.      Left lower leg: No edema.      Comments: Complains of neck pain -    Skin:     General: Skin is warm and dry.   Neurological:      Mental Status: She is alert and oriented to person, place, and time.      Motor: Weakness present.             Significant Labs: All pertinent labs within the past 24 hours have been reviewed.  Recent Lab Results       None            Significant Imaging: I have reviewed all pertinent imaging results/findings within the past 24 hours.

## 2024-03-15 NOTE — PROGRESS NOTES
[facility] - Medical Surgical Unit  Hospital Medicine  Progress Note    Patient Name: Roselia Eldridge  MRN: [id number]  Patient Class: IP- Swing   Admission Date: 3/11/2024  Length of Stay: 4 days  Attending Physician: Galindo De Jesus IV, DO  Primary Care Provider: Pamela Jurado DO        Subjective:     Principal Problem:Generalized weakness        HPI:  Roselia Eldridge is a 78 year old female with pmh cervical spondylosis, primary hypertension, OAB, mixed hyperlipidemia, depression, insomnia, GERD, CKD, CAD and vitamin D deficiency.   She was admitted to inpatient on 03/06/24 for thoracic pain , right rib pain after fall at home and dehydration. She was given IV fluids and norco for pain. She began to have visual hallucinations after norco. Norco was dcd and back pain was treated with lidoderm patch and ibuprofen.  Denies any hallucinations since Saturday. She continues to have impaired mobility due to T4 compression fracture. She has TLSO brace that she wears while out of bed. States ibuprofen is working well for her back pain. She is agreeable with swing bed for continued therapy. She is wet this morning, hesitant to let nurses clean her- encouraged her to let nurses/ CNAs clean her so her skin will not become irritated and break down- states she is not a child, she will not lay in it that long. She is alert and oriented x 3.     Overview/Hospital Course:  03/12/24 Called to room to check on Mrs. Eldridge- nurse told me that she had just woke up and that she is not talking to her. On my arrival, she is grunting. Asked her to talk to me and she is alert and responsive. She is oriented x 3. She tells me her neck hurts- states she has history of DDD. Asked her to rank pain on scale 1- 10 and she says it hurts a lot. Asked her what usually helps with her pain, states she has gotten botox injections in the past. Asked her if ibuproen has helped and she states it has. Will get dose of ibuprofen and monitor.  She is able to follow all commands, has equal strength bilaterally.  Review of records show imaging on 02/16/24.-The cervical vertebral body heights are maintained. There is minimal anterolisthesis of C3 over C4. There is no evidence of significant translational instability on flexion or extension views. Multilevel degenerative changes including mild multilevel   intervertebral disc space loss and mild marginal osteophytosis. There is multilevel degenerative uncovertebral joint arthropathy bilaterally. Also shows botox injections to neck on 02/19/24.   Consulted with Dr. De Jesus re neck pain.     03/13/24 Awake and resting in bed. Complains of neck pain - states she always has this pain but that robaxin and ultram have helped. Requesting robaxin this morning. Talked with her re therapy - States she was able to walk some yesterday- records show she walked 10 feet twice. Encouraged her to continue to participate and do more as she is able.  She has o2 per nc - does not use this at home - will wean to room air as she tolerates. Ate 100% of breakfast served.      03/14/24 Awake and resting in bed. Complains of pain at base of neck with radiation of pain to shoulders. States she took baclofen at hs while at home and that helped. She is on robaxin now. Had increase in AST/ALT  a few days ago . Statin and ibuprofen dcd. Now normal. She is requesting motrin for neck pain. Will order BID prn and watch labs. Complains of dry eyes. States she uses artificial tears at home - ordered this for her. Continue therapy for strengthening.      0955 went back to check on Mrs. Eldridge. She states she is comfortable now. Denies neck pain and headache.      03/15/24 Awake and resting in bed. States neck pain is better but continues to have some pain at base of neck. Reports good appetite. She reports using the bed pan. Talked with her re the importance of functioning to the highest level that she is able, including getting out of the bed to  go to the toilet. Her goal is to return home alone. Asked her to do more with therapy and to call us when she needs to go to the toilet.    Interval History: weakness, neck pain    Review of Systems   Constitutional:  Negative for appetite change and fatigue.   HENT:  Negative for sinus pressure, sinus pain and trouble swallowing.    Eyes:  Negative for discharge and itching.   Respiratory:  Negative for cough.    Cardiovascular:  Negative for chest pain and palpitations.   Gastrointestinal:  Negative for constipation, diarrhea, nausea and vomiting.   Genitourinary:  Negative for difficulty urinating and dysuria.        OAB    Musculoskeletal:  Positive for arthralgias. Negative for back pain and joint swelling.        Complains of neck pain -    Skin:  Negative for color change.   Neurological:  Positive for weakness. Negative for light-headedness and headaches.   Psychiatric/Behavioral:  Negative for hallucinations.         History of major depression   On sertraline      Objective:     Vital Signs (Most Recent):  Temp: 97.8 °F (36.6 °C) (03/15/24 0709)  Pulse: 62 (03/15/24 0709)  Resp: 18 (03/15/24 0709)  BP: 133/72 (03/15/24 0709)  SpO2: (!) 94 % (03/15/24 0709) Vital Signs (24h Range):  Temp:  [97.8 °F (36.6 °C)] 97.8 °F (36.6 °C)  Pulse:  [62-72] 62  Resp:  [16-18] 18  SpO2:  [93 %-94 %] 94 %  BP: (107-138)/(65-79) 133/72     Weight:  (would not let me weight her. stated put the same weight she was when  she came here..)  Body mass index is 31.97 kg/m².    Intake/Output Summary (Last 24 hours) at 3/15/2024 0739  Last data filed at 3/14/2024 2324  Gross per 24 hour   Intake 800 ml   Output --   Net 800 ml         Physical Exam  HENT:      Head: Normocephalic.      Nose: Nose normal.      Mouth/Throat:      Mouth: Mucous membranes are moist.   Eyes:      Pupils: Pupils are equal, round, and reactive to light.   Neck:     Cardiovascular:      Rate and Rhythm: Normal rate and regular rhythm.      Pulses: Normal  pulses.      Heart sounds: Normal heart sounds.   Pulmonary:      Effort: Pulmonary effort is normal. No respiratory distress.      Breath sounds: Normal breath sounds. No stridor. No wheezing or rhonchi.   Abdominal:      General: Bowel sounds are normal. There is no distension.      Palpations: Abdomen is soft. There is no mass.      Tenderness: There is no abdominal tenderness. There is no guarding or rebound.      Hernia: No hernia is present.   Musculoskeletal:         General: No swelling, tenderness or signs of injury.      Cervical back: Muscular tenderness present.      Right lower leg: No edema.      Left lower leg: No edema.      Comments: Complains of neck pain -    Skin:     General: Skin is warm and dry.   Neurological:      Mental Status: She is alert and oriented to person, place, and time.      Motor: Weakness present.             Significant Labs: All pertinent labs within the past 24 hours have been reviewed.  Recent Lab Results       None            Significant Imaging: I have reviewed all pertinent imaging results/findings within the past 24 hours.    Assessment/Plan:      * Generalized weakness    03/11/24 t4 compression fracture   TLSO brace  while out of bed   Fall precautions       03/12/24 ambulated 48 feet with therapist yesterday    03/13/24 walked 10 feet twice yesterday    03/14/24 continue therapy    03/15/24 continue therapy, encouraged Mrs. Eldridge to put more effort in to therapy- to participate an full effort    Compression fracture of thoracic vertebra with routine healing  03/11/24   TLSO brace while out of bed  Ibuprofen and lidoderm patch for pain relief   Fall precautions      03/12/24 continue TLSO brace while out of bed  Ibuproen and lidoderm patch    03/14/24 TLSO brace while out of bed     Impaired mobility and ADLs    03/11/24 T 4 compression fracture  TLSO brace while out of bed  PT and OT to evaluate and treat     Acute midline back pain  03/11/24 lidoderm patch and  ibuprofen for relief       Primary hypertension  Chronic, controlled. Latest blood pressure and vitals reviewed-     Temp:  [97.4 °F (36.3 °C)-98.2 °F (36.8 °C)]   Pulse:  [57-75]   Resp:  [18-20]   BP: (108-136)/(55-71)   SpO2:  [94 %-98 %] .   Home meds for hypertension were reviewed and noted below.   Hypertension Medications               chlorthalidone (HYGROTEN) 25 MG Tab Take 25 mg by mouth.    metoprolol succinate (TOPROL-XL) 50 MG 24 hr tablet Take 1 tablet by mouth 2 (two) times daily.            While in the hospital, will manage blood pressure as follows; Continue home antihypertensive regimen    Will utilize p.r.n. blood pressure medication only if patient's blood pressure greater than 180/110 and she develops symptoms such as worsening chest pain or shortness of breath.      03/12/24 stable    GERD (gastroesophageal reflux disease)    03/11/24 continue protonix    Poor appetite    03/11/24 improving, ate 100% of breakfast served this morning    03/15/24 reports good appetite this morning    Dyslipidemia  03/11/24 continue statin       Depression  Hx of major depression   Controlled - continue sertraline       03/12/24 continue sertraline       Neck pain  03/12/24 complains of left neck pain - would not rank pain on pain scale -states it hurts a lot  Chart review shows imaging on 02/16 and botox injection son 02/19/24. Talked with her re what has releived her pain in past- she states botox has helped, ibuprofen helps.   Added ibuprofen and robaxin prn     The cervical vertebral body heights are maintained. There is minimal anterolisthesis of C3 over C4. There is no evidence of significant translational instability on flexion or extension views. Multilevel degenerative changes including mild multilevel   intervertebral disc space loss and mild marginal osteophytosis. There is multilevel degenerative uncovertebral joint arthropathy bilaterally.     03/13/24 robaxin prn  ultram prn pain      VTE Risk  Mitigation (From admission, onward)           Ordered     enoxaparin injection 40 mg  Every 24 hours         03/11/24 1248                    Discharge Planning   MARILEE: 3/28/2024     Code Status: DNR   Is the patient medically ready for discharge?:     Reason for patient still in hospital (select all that apply): Treatment                     LESLEE Yoo  Department of Hospital Medicine   [facility] - Medical Surgical Unit

## 2024-03-15 NOTE — PT/OT/SLP PROGRESS
"Physical Therapy Treatment    Patient Name:  Roselia Eldridge   MRN:  [id number]    Recommendations:     Discharge Recommendations: Low Intensity Therapy  Discharge Equipment Recommendations: walker, rolling  Barriers to discharge: Decreased caregiver support    Assessment:     Roselia Eldridge is a 78 y.o. female admitted with a medical diagnosis of Generalized weakness.  She presents with the following impairments/functional limitations: weakness, impaired endurance, impaired self care skills, impaired functional mobility, gait instability, impaired balance, decreased lower extremity function, decreased safety awareness, pain Patient was sitting up in recliner when therapist entered into room. Patient was more alert and wiling to participate with therapy today. Patient with noticeable improvement in participation and ambulation distance today. Patient's only noted complaint about her neck was not eing able to "hold it up" towards the end of ambulation.     Rehab Prognosis: Fair; patient would benefit from acute skilled PT services to address these deficits and reach maximum level of function.    Recent Surgery: * No surgery found *      Plan:     During this hospitalization, patient to be seen 5 x/week to address the identified rehab impairments via gait training, therapeutic activities, therapeutic exercises, neuromuscular re-education and progress toward the following goals:    Plan of Care Expires:  03/28/24    Subjective     Chief Complaint: weakness  Patient/Family Comments/goals: return home  Pain/Comfort:  Pain Rating 1: 0/10      Objective:     Communicated with nurse prior to session.  Patient found up in chair with   upon PT entry to room.     General Precautions: Standard, fall  Orthopedic Precautions:    Braces: TLSO  Respiratory Status: Room air     Functional Mobility:  Transfers:     Sit to Stand:  contact guard assistance with rolling walker  Gait: Patient ambulated 175' with RW and CGA. Patient with no " noted LOB.      AM-PAC 6 CLICK MOBILITY  Turning over in bed (including adjusting bedclothes, sheets and blankets)?: 2  Sitting down on and standing up from a chair with arms (e.g., wheelchair, bedside commode, etc.): 3  Moving from lying on back to sitting on the side of the bed?: 3  Moving to and from a bed to a chair (including a wheelchair)?: 3  Need to walk in hospital room?: 3  Climbing 3-5 steps with a railing?: 2  Basic Mobility Total Score: 16       Treatment & Education:  Transfers and ambulation as listed above.   Ankle pumps - 20 reps  Long arc quads - 20 reps   Seated hip flexion - 20 reps    Patient left up in chair with call button in reach and chair alarm on..    GOALS:   Multidisciplinary Problems       Physical Therapy Goals          Problem: Physical Therapy    Goal Priority Disciplines Outcome Goal Variances Interventions   Physical Therapy Goal     PT, PT/OT Ongoing, Progressing     Description: Short-term Goals: 1.5 weeks  1. Patient will perform supine to/from sit with minimal assistance x 2 people to improve independence and safety with bed mobility.  2. Patient will perform sit to/from stand with a rolling walker with minimal assistance to improve independence and safety with transfers.  3. Patient will ambulate 150 feet with a rolling walker with contact guard assist to improve independence and safety with gait.  4. Patient will tolerate 15 minutes of physical therapy intervention to improve endurance and activity tolerance.    Long-term goals: 3 weeks  1. Patient will perform supine to/from sit with minimal assistance to improve independence and safety with bed mobility.  2. Patient will perform sit to/from stand with a rolling walker with contact guard assist to improve independence and safety with transfers.  3. Patient will ambulate 300 feet with a rolling walker with standby assist to improve independence and safety with gait.  4. Patient will tolerate 30 minutes of physical therapy  intervention to improve endurance and activity tolerance.                         Time Tracking:     PT Received On: 03/15/24  PT Start Time: 0952     PT Stop Time: 1035  PT Total Time (min): 43 min     Billable Minutes: Gait Training 23 and Therapeutic Exercise 20    Treatment Type: Treatment  PT/PTA: PTA     Number of PTA visits since last PT visit: 4   ROCHELLE Velásquez   03/15/2024

## 2024-03-15 NOTE — PT/OT/SLP PROGRESS
"Occupational Therapy   Treatment    Name: Roselia Eldridge  MRN: [id number]  Admitting Diagnosis:  Generalized weakness      Recommendations:     Discharge Recommendations:    Discharge Equipment Recommendations:  walker, rolling  Barriers to discharge:       Assessment:     Roselia Eldridge is a 78 y.o. female with a medical diagnosis of Generalized weakness.  She presents with laying supine and stating "I just got back in bed" . Performance deficits affecting function are weakness, impaired endurance, impaired self care skills. Pt agreed to participated in OT but bed thera ex only. Pt educated on the importance out of bed activity.     Rehab Prognosis:  Fair; patient would benefit from acute skilled OT services to address these deficits and reach maximum level of function.       Plan:     Patient to be seen 5 x/week to address the above listed problems via self-care/home management, therapeutic activities, therapeutic exercises  Plan of Care Expires:    Plan of Care Reviewed with: patient    Subjective     Chief Complaint: back pain  Patient/Family Comments/goals: increase mobility and ADLs  Pain/Comfort:  Pain Rating 1: 3/10  Location - Side 1: Right  Location - Orientation 1: lower    Objective:     Communicated with: Pt prior to session.  Patient found supine with   upon OT entry to room.    General Precautions: Standard, fall    Orthopedic Precautions:spinal precautions  Braces: TLSO  Respiratory Status: Room air     Occupational Performance:       AMPA 6 Click ADL: 14    Treatment & Education:    Therapeutic exercise    While laying supine in bed the pt completed scapular retraction 2 sets of 12 rep with green theraband and OT stabilizing to increase trunk stability for upright posture to prevent risk of falls during ADL performance.    While laying supine in bed the pt completed bicep curls 2 sets of 15 rep with 2# DB  to increase BUE strength for functional reaching, carrying, lifting as well as trunk support when " ambulating to restroom.   Patient left supine with all lines intact and call button in reach    GOALS:   Multidisciplinary Problems       Occupational Therapy Goals          Problem: Occupational Therapy    Goal Priority Disciplines Outcome Interventions   Occupational Therapy Goal     OT, PT/OT Ongoing, Progressing    Description: STG:  Pt will perform grooming with setup  Pt will bathe with Mod I  Pt will perform UE dressing with Mod I  Pt will perform LE dressing with Mod I  Pt will sit EOB x 15 min with supervisoin assistance  Pt will transfer bed/chair/bsc with Mod I  Pt will perform standing task x 15 min with supervision assistance  Pt will tolerate 30 minutes of tx without fatigue      LT.Restore to max I with self care and mobility.                         Time Tracking:     OT Date of Treatment:    OT Start Time: 103  OT Stop Time: 113  OT Total Time (min): 10 min    Billable Minutes:Therapeutic Exercise 10    OT/JAIRON: OT     Number of JAIRON visits since last OT visit: 1    3/15/2024

## 2024-03-16 PROCEDURE — 11000004 HC SNF PRIVATE

## 2024-03-16 PROCEDURE — 27000944

## 2024-03-16 PROCEDURE — 27000982 HC MATTRESS, MATRIX LAL RENTAL

## 2024-03-16 PROCEDURE — 25000003 PHARM REV CODE 250: Performed by: FAMILY MEDICINE

## 2024-03-16 PROCEDURE — 63600175 PHARM REV CODE 636 W HCPCS: Performed by: NURSE PRACTITIONER

## 2024-03-16 PROCEDURE — 25000003 PHARM REV CODE 250: Performed by: NURSE PRACTITIONER

## 2024-03-16 PROCEDURE — 94761 N-INVAS EAR/PLS OXIMETRY MLT: CPT

## 2024-03-16 RX ADMIN — Medication 400 MG: at 08:03

## 2024-03-16 RX ADMIN — OXYBUTYNIN CHLORIDE 10 MG: 5 TABLET, EXTENDED RELEASE ORAL at 08:03

## 2024-03-16 RX ADMIN — NAPROXEN 500 MG: 250 TABLET ORAL at 01:03

## 2024-03-16 RX ADMIN — GLYCERIN 1 DROP: .002; .002; .01 SOLUTION/ DROPS OPHTHALMIC at 09:03

## 2024-03-16 RX ADMIN — PANTOPRAZOLE SODIUM 40 MG: 40 TABLET, DELAYED RELEASE ORAL at 08:03

## 2024-03-16 RX ADMIN — LIDOCAINE 1 PATCH: 50 PATCH CUTANEOUS at 01:03

## 2024-03-16 RX ADMIN — SENNOSIDES AND DOCUSATE SODIUM 1 TABLET: 8.6; 5 TABLET ORAL at 09:03

## 2024-03-16 RX ADMIN — ENOXAPARIN SODIUM 40 MG: 40 INJECTION SUBCUTANEOUS at 04:03

## 2024-03-16 RX ADMIN — Medication 5000 UNITS: at 08:03

## 2024-03-16 RX ADMIN — NAPROXEN 500 MG: 250 TABLET ORAL at 09:03

## 2024-03-16 RX ADMIN — METOPROLOL SUCCINATE 50 MG: 50 TABLET, EXTENDED RELEASE ORAL at 08:03

## 2024-03-16 RX ADMIN — OXYBUTYNIN CHLORIDE 10 MG: 5 TABLET, EXTENDED RELEASE ORAL at 09:03

## 2024-03-16 RX ADMIN — SERTRALINE HYDROCHLORIDE 100 MG: 50 TABLET ORAL at 08:03

## 2024-03-16 RX ADMIN — SENNOSIDES AND DOCUSATE SODIUM 1 TABLET: 8.6; 5 TABLET ORAL at 08:03

## 2024-03-16 RX ADMIN — MELATONIN TAB 3 MG 6 MG: 3 TAB at 09:03

## 2024-03-16 RX ADMIN — METOPROLOL SUCCINATE 50 MG: 50 TABLET, EXTENDED RELEASE ORAL at 09:03

## 2024-03-16 NOTE — PLAN OF CARE
Problem: Adult Inpatient Plan of Care  Goal: Absence of Hospital-Acquired Illness or Injury  Outcome: Ongoing, Progressing     Problem: Fall Injury Risk  Goal: Absence of Fall and Fall-Related Injury  Outcome: Ongoing, Progressing  Intervention: Promote Injury-Free Environment  Flowsheets (Taken 3/16/2024 0212)  Safety Promotion/Fall Prevention:   assistive device/personal item within reach   instructed to call staff for mobility   side rails raised x 2   room near unit station     Problem: Adult Inpatient Plan of Care  Goal: Patient-Specific Goal (Individualized)  Outcome: Met

## 2024-03-17 PROCEDURE — 94761 N-INVAS EAR/PLS OXIMETRY MLT: CPT

## 2024-03-17 PROCEDURE — 25000003 PHARM REV CODE 250: Performed by: NURSE PRACTITIONER

## 2024-03-17 PROCEDURE — 25000003 PHARM REV CODE 250: Performed by: FAMILY MEDICINE

## 2024-03-17 PROCEDURE — 11000004 HC SNF PRIVATE

## 2024-03-17 PROCEDURE — 99900035 HC TECH TIME PER 15 MIN (STAT)

## 2024-03-17 PROCEDURE — 63600175 PHARM REV CODE 636 W HCPCS: Performed by: NURSE PRACTITIONER

## 2024-03-17 RX ADMIN — METOPROLOL SUCCINATE 50 MG: 50 TABLET, EXTENDED RELEASE ORAL at 08:03

## 2024-03-17 RX ADMIN — NAPROXEN 500 MG: 250 TABLET ORAL at 08:03

## 2024-03-17 RX ADMIN — PANTOPRAZOLE SODIUM 40 MG: 40 TABLET, DELAYED RELEASE ORAL at 08:03

## 2024-03-17 RX ADMIN — GLYCERIN 1 DROP: .002; .002; .01 SOLUTION/ DROPS OPHTHALMIC at 09:03

## 2024-03-17 RX ADMIN — SENNOSIDES AND DOCUSATE SODIUM 1 TABLET: 8.6; 5 TABLET ORAL at 09:03

## 2024-03-17 RX ADMIN — METOPROLOL SUCCINATE 50 MG: 50 TABLET, EXTENDED RELEASE ORAL at 09:03

## 2024-03-17 RX ADMIN — SENNOSIDES AND DOCUSATE SODIUM 1 TABLET: 8.6; 5 TABLET ORAL at 08:03

## 2024-03-17 RX ADMIN — MELATONIN TAB 3 MG 6 MG: 3 TAB at 09:03

## 2024-03-17 RX ADMIN — SERTRALINE HYDROCHLORIDE 100 MG: 50 TABLET ORAL at 08:03

## 2024-03-17 RX ADMIN — NAPROXEN 500 MG: 250 TABLET ORAL at 09:03

## 2024-03-17 RX ADMIN — OXYBUTYNIN CHLORIDE 10 MG: 5 TABLET, EXTENDED RELEASE ORAL at 08:03

## 2024-03-17 RX ADMIN — ASPIRIN 325 MG ORAL TABLET 325 MG: 325 PILL ORAL at 08:03

## 2024-03-17 RX ADMIN — Medication 400 MG: at 08:03

## 2024-03-17 RX ADMIN — OXYBUTYNIN CHLORIDE 10 MG: 5 TABLET, EXTENDED RELEASE ORAL at 09:03

## 2024-03-17 RX ADMIN — Medication 5000 UNITS: at 08:03

## 2024-03-17 RX ADMIN — ENOXAPARIN SODIUM 40 MG: 40 INJECTION SUBCUTANEOUS at 06:03

## 2024-03-17 RX ADMIN — LIDOCAINE 1 PATCH: 50 PATCH CUTANEOUS at 01:03

## 2024-03-17 NOTE — PLAN OF CARE
Problem: Adult Inpatient Plan of Care  Goal: Plan of Care Review  Outcome: Ongoing, Progressing  Goal: Absence of Hospital-Acquired Illness or Injury  Outcome: Ongoing, Progressing  Goal: Optimal Comfort and Wellbeing  Outcome: Ongoing, Progressing  Goal: Readiness for Transition of Care  Outcome: Ongoing, Progressing     Problem: Skin Injury Risk Increased  Goal: Skin Health and Integrity  Outcome: Ongoing, Progressing     Problem: Fall Injury Risk  Goal: Absence of Fall and Fall-Related Injury  Outcome: Ongoing, Progressing     Problem: Oral Intake Inadequate  Goal: Improved Oral Intake  Outcome: Ongoing, Progressing

## 2024-03-17 NOTE — PLAN OF CARE
Problem: Adult Inpatient Plan of Care  Goal: Plan of Care Review  Outcome: Ongoing, Progressing     Problem: Fall Injury Risk  Goal: Absence of Fall and Fall-Related Injury  Outcome: Ongoing, Progressing  Intervention: Promote Injury-Free Environment  Flowsheets (Taken 3/17/2024 7217)  Safety Promotion/Fall Prevention:   assistive device/personal item within reach   bed alarm set   commode/urinal/bedpan at bedside   side rails raised x 2   instructed to call staff for mobility

## 2024-03-18 LAB
ALBUMIN SERPL BCP-MCNC: 2.7 G/DL (ref 3.5–5)
ALBUMIN/GLOB SERPL: 0.7 {RATIO}
ALP SERPL-CCNC: 88 U/L (ref 55–142)
ALT SERPL W P-5'-P-CCNC: 34 U/L (ref 13–56)
ANION GAP SERPL CALCULATED.3IONS-SCNC: 7 MMOL/L (ref 7–16)
AST SERPL W P-5'-P-CCNC: 36 U/L (ref 15–37)
BASOPHILS # BLD AUTO: 0.04 K/UL (ref 0–0.2)
BASOPHILS NFR BLD AUTO: 0.6 % (ref 0–1)
BILIRUB SERPL-MCNC: 0.2 MG/DL (ref ?–1.2)
BUN SERPL-MCNC: 21 MG/DL (ref 7–18)
BUN/CREAT SERPL: 23 (ref 6–20)
CALCIUM SERPL-MCNC: 9.7 MG/DL (ref 8.5–10.1)
CHLORIDE SERPL-SCNC: 106 MMOL/L (ref 98–107)
CO2 SERPL-SCNC: 33 MMOL/L (ref 21–32)
CREAT SERPL-MCNC: 0.91 MG/DL (ref 0.55–1.02)
DIFFERENTIAL METHOD BLD: ABNORMAL
EGFR (NO RACE VARIABLE) (RUSH/TITUS): 65 ML/MIN/1.73M2
EOSINOPHIL # BLD AUTO: 0.32 K/UL (ref 0–0.5)
EOSINOPHIL NFR BLD AUTO: 5.1 % (ref 1–4)
EOSINOPHIL NFR BLD MANUAL: 4 % (ref 1–4)
ERYTHROCYTE [DISTWIDTH] IN BLOOD BY AUTOMATED COUNT: 12.8 % (ref 11.5–14.5)
GLOBULIN SER-MCNC: 4.1 G/DL (ref 2–4)
GLUCOSE SERPL-MCNC: 124 MG/DL (ref 74–106)
HCT VFR BLD AUTO: 28.3 % (ref 38–47)
HGB BLD-MCNC: 8.9 G/DL (ref 12–16)
LYMPHOCYTES # BLD AUTO: 1.73 K/UL (ref 1–4.8)
LYMPHOCYTES NFR BLD AUTO: 27.4 % (ref 27–41)
LYMPHOCYTES NFR BLD MANUAL: 27 % (ref 27–41)
MAGNESIUM SERPL-MCNC: 1.7 MG/DL (ref 1.7–2.3)
MCH RBC QN AUTO: 31 PG (ref 27–31)
MCHC RBC AUTO-ENTMCNC: 31.4 G/DL (ref 32–36)
MCV RBC AUTO: 98.6 FL (ref 80–96)
MONOCYTES # BLD AUTO: 0.49 K/UL (ref 0–0.8)
MONOCYTES NFR BLD AUTO: 7.8 % (ref 2–6)
MONOCYTES NFR BLD MANUAL: 6 % (ref 2–6)
MPC BLD CALC-MCNC: 9 FL (ref 9.4–12.4)
NEUTROPHILS # BLD AUTO: 3.74 K/UL (ref 1.8–7.7)
NEUTROPHILS NFR BLD AUTO: 59.1 % (ref 53–65)
NEUTS BAND NFR BLD MANUAL: 11 % (ref 1–5)
NEUTS SEG NFR BLD MANUAL: 52 % (ref 50–62)
NRBC BLD MANUAL-RTO: ABNORMAL %
PHOSPHATE SERPL-MCNC: 3.1 MG/DL (ref 2.5–4.5)
PLATELET # BLD AUTO: 330 K/UL (ref 150–400)
PLATELET MORPHOLOGY: NORMAL
POTASSIUM SERPL-SCNC: 4.7 MMOL/L (ref 3.5–5.1)
PROT SERPL-MCNC: 6.8 G/DL (ref 6.4–8.2)
RBC # BLD AUTO: 2.87 M/UL (ref 4.2–5.4)
RBC MORPH BLD: NORMAL
SODIUM SERPL-SCNC: 141 MMOL/L (ref 136–145)
WBC # BLD AUTO: 6.32 K/UL (ref 4.5–11)

## 2024-03-18 PROCEDURE — 63600175 PHARM REV CODE 636 W HCPCS: Performed by: NURSE PRACTITIONER

## 2024-03-18 PROCEDURE — 99900035 HC TECH TIME PER 15 MIN (STAT)

## 2024-03-18 PROCEDURE — 27000944

## 2024-03-18 PROCEDURE — 94761 N-INVAS EAR/PLS OXIMETRY MLT: CPT

## 2024-03-18 PROCEDURE — 27000982 HC MATTRESS, MATRIX LAL RENTAL

## 2024-03-18 PROCEDURE — 25000242 PHARM REV CODE 250 ALT 637 W/ HCPCS: Performed by: NURSE PRACTITIONER

## 2024-03-18 PROCEDURE — 97110 THERAPEUTIC EXERCISES: CPT

## 2024-03-18 PROCEDURE — 80053 COMPREHEN METABOLIC PANEL: CPT | Performed by: NURSE PRACTITIONER

## 2024-03-18 PROCEDURE — 83735 ASSAY OF MAGNESIUM: CPT | Performed by: NURSE PRACTITIONER

## 2024-03-18 PROCEDURE — 85025 COMPLETE CBC W/AUTO DIFF WBC: CPT | Performed by: NURSE PRACTITIONER

## 2024-03-18 PROCEDURE — 25000003 PHARM REV CODE 250: Performed by: FAMILY MEDICINE

## 2024-03-18 PROCEDURE — 11000004 HC SNF PRIVATE

## 2024-03-18 PROCEDURE — 97116 GAIT TRAINING THERAPY: CPT

## 2024-03-18 PROCEDURE — 97530 THERAPEUTIC ACTIVITIES: CPT

## 2024-03-18 PROCEDURE — 84100 ASSAY OF PHOSPHORUS: CPT | Performed by: NURSE PRACTITIONER

## 2024-03-18 PROCEDURE — 25000003 PHARM REV CODE 250: Performed by: NURSE PRACTITIONER

## 2024-03-18 RX ORDER — FLUTICASONE PROPIONATE 50 MCG
2 SPRAY, SUSPENSION (ML) NASAL DAILY
Status: DISCONTINUED | OUTPATIENT
Start: 2024-03-18 | End: 2024-03-19

## 2024-03-18 RX ADMIN — Medication 400 MG: at 09:03

## 2024-03-18 RX ADMIN — PANTOPRAZOLE SODIUM 40 MG: 40 TABLET, DELAYED RELEASE ORAL at 09:03

## 2024-03-18 RX ADMIN — SENNOSIDES AND DOCUSATE SODIUM 1 TABLET: 8.6; 5 TABLET ORAL at 09:03

## 2024-03-18 RX ADMIN — ENOXAPARIN SODIUM 40 MG: 40 INJECTION SUBCUTANEOUS at 04:03

## 2024-03-18 RX ADMIN — METOPROLOL SUCCINATE 50 MG: 50 TABLET, EXTENDED RELEASE ORAL at 09:03

## 2024-03-18 RX ADMIN — LIDOCAINE 1 PATCH: 50 PATCH CUTANEOUS at 12:03

## 2024-03-18 RX ADMIN — MELATONIN TAB 3 MG 6 MG: 3 TAB at 09:03

## 2024-03-18 RX ADMIN — Medication 5000 UNITS: at 09:03

## 2024-03-18 RX ADMIN — OXYBUTYNIN CHLORIDE 10 MG: 5 TABLET, EXTENDED RELEASE ORAL at 09:03

## 2024-03-18 RX ADMIN — SERTRALINE HYDROCHLORIDE 100 MG: 50 TABLET ORAL at 09:03

## 2024-03-18 RX ADMIN — FLUTICASONE PROPIONATE 100 MCG: 50 SPRAY, METERED NASAL at 02:03

## 2024-03-18 NOTE — ASSESSMENT & PLAN NOTE
03/11/24 t4 compression fracture   TLSO brace  while out of bed   Fall precautions       03/12/24 ambulated 48 feet with therapist yesterday    03/13/24 walked 10 feet twice yesterday    03/14/24 continue therapy    03/15/24 continue therapy, encouraged Mrs. Edlridge to put more effort in to therapy- to participate an full effort    03/18/24 walked 253 feet with therapist

## 2024-03-18 NOTE — PLAN OF CARE
Problem: Fall Injury Risk  Goal: Absence of Fall and Fall-Related Injury  Outcome: Ongoing, Progressing  Intervention: Promote Injury-Free Environment  Flowsheets (Taken 3/18/2024 9040)  Safety Promotion/Fall Prevention:   assistive device/personal item within reach   instructed to call staff for mobility   side rails raised x 2

## 2024-03-18 NOTE — PT/OT/SLP PROGRESS
Physical Therapy Treatment    Patient Name:  Roselia Eldridge   MRN:  [id number]    Recommendations:     Discharge Recommendations: Low Intensity Therapy  Discharge Equipment Recommendations: walker, rolling  Barriers to discharge: Inaccessible home and Decreased caregiver support    Assessment:     Roselia Eldridge is a 78 y.o. female admitted with a medical diagnosis of Generalized weakness.  She presents with the following impairments/functional limitations: weakness, impaired endurance, impaired self care skills, impaired functional mobility, gait instability, impaired balance, decreased lower extremity function, decreased safety awareness, pain. Patient with improved gait distance and decreased assist required with bed mobility this visit. Patient's TLSO brace was non-functioning upon Physical Therapist's arrival to room. Physical Therapist re-adjusted patient's TLSO brace so that it is now functional. Physical Therapist found a bag of soiled pads in a garbage bag inside patient's closet when looking for patient's shoes per patient request. Physical Therapist notified LESLEE Caro and Kelly Gamino RN who reported they would look into the situation.     Rehab Prognosis: Good; patient would benefit from acute skilled PT services to address these deficits and reach maximum level of function.    Recent Surgery: * No surgery found *      Plan:     During this hospitalization, patient to be seen 5 x/week to address the identified rehab impairments via gait training, therapeutic activities, therapeutic exercises, neuromuscular re-education and progress toward the following goals:    Plan of Care Expires:  03/28/24    Subjective     Chief Complaint: right cervical pain (intermittent; improving now that LESLEE Caro provided her with a cervical pillow)  Patient/Family Comments/goals: Patient's goal is to return home when functionally able.     Pain/Comfort:  Pain Rating 1: 0/10 (at rest; intermittent sharp pain in  the right cervical region)  Location - Side 1: Right  Location 1: cervical spine  Pain Addressed 1: Reposition, Distraction, Other (see comments) (functional mobility)  Pain Rating Post-Intervention 1: 0/10    Objective:     Communicated with nurse prior to session.Patient found supine using her cervical pillow upon PT entry to room.     General Precautions: Standard, fall  Orthopedic Precautions:    Braces: TLSO  Respiratory Status: Room air     Functional Mobility:  Bed Mobility:     Supine to Sit: stand by assistance  Transfers:     Sit to Stand:  contact guard assistance with rolling walker  Gait: 135 feet and 118 feet with rolling walker with contact guard assist; unsteady; lateral path deviations; intermittent verbal cues to manipulate rolling walker to keep from hitting wall; short step lengths    AM-PAC 6 CLICK MOBILITY  Turning over in bed (including adjusting bedclothes, sheets and blankets)?: 4  Sitting down on and standing up from a chair with arms (e.g., wheelchair, bedside commode, etc.): 3  Moving from lying on back to sitting on the side of the bed?: 4  Moving to and from a bed to a chair (including a wheelchair)?: 3  Need to walk in hospital room?: 3  Climbing 3-5 steps with a railing?: 3  Basic Mobility Total Score: 20     Treatment & Education:    Bed mobility, transfers, and gait performed as listed above.    NuStep: 8 minutes    Patient left up in chair with chair alarm on and Kelly Gamino RN present.    GOALS:   Multidisciplinary Problems       Physical Therapy Goals          Problem: Physical Therapy    Goal Priority Disciplines Outcome Goal Variances Interventions   Physical Therapy Goal     PT, PT/OT Ongoing, Progressing     Description: Short-term Goals: 1.5 weeks  1. Patient will perform supine to/from sit with minimal assistance x 2 people to improve independence and safety with bed mobility.  2. Patient will perform sit to/from stand with a rolling walker with minimal assistance to  improve independence and safety with transfers.  3. Patient will ambulate 150 feet with a rolling walker with contact guard assist to improve independence and safety with gait.  4. Patient will tolerate 15 minutes of physical therapy intervention to improve endurance and activity tolerance.    Long-term goals: 3 weeks  1. Patient will perform supine to/from sit with minimal assistance to improve independence and safety with bed mobility.  2. Patient will perform sit to/from stand with a rolling walker with contact guard assist to improve independence and safety with transfers.  3. Patient will ambulate 300 feet with a rolling walker with standby assist to improve independence and safety with gait.  4. Patient will tolerate 30 minutes of physical therapy intervention to improve endurance and activity tolerance.                       Time Tracking:     PT Received On: 03/18/24  PT Start Time: 0852     PT Stop Time: 0930  PT Total Time (min): 38 min     Billable Minutes: Gait Training 15 minutes, Therapeutic Activity 15 minutes, and Therapeutic Exercise 8 minutes    Treatment Type: Treatment, 6th Visit  PT/PTA: PT     Number of PTA visits since last PT visit: 0     03/18/2024

## 2024-03-18 NOTE — PLAN OF CARE
Problem: Adult Inpatient Plan of Care  Goal: Plan of Care Review  Outcome: Ongoing, Progressing  Goal: Absence of Hospital-Acquired Illness or Injury  Outcome: Ongoing, Progressing  Intervention: Prevent and Manage VTE (Venous Thromboembolism) Risk  Flowsheets (Taken 3/18/2024 1314)  VTE Prevention/Management:   fluids promoted   ambulation promoted  Goal: Optimal Comfort and Wellbeing  Outcome: Ongoing, Progressing  Goal: Readiness for Transition of Care  Outcome: Ongoing, Progressing  Intervention: Mutually Develop Transition Plan  Flowsheets (Taken 3/18/2024 1314)  Equipment Currently Used at Home: rollator  Transportation Anticipated: family or friend will provide     Problem: Skin Injury Risk Increased  Goal: Skin Health and Integrity  Outcome: Ongoing, Progressing  Intervention: Promote and Optimize Oral Intake  Flowsheets (Taken 3/18/2024 1314)  Oral Nutrition Promotion: safe use of adaptive equipment encouraged     Problem: Fall Injury Risk  Goal: Absence of Fall and Fall-Related Injury  Outcome: Ongoing, Progressing  Intervention: Identify and Manage Contributors  Flowsheets (Taken 3/18/2024 1314)  Self-Care Promotion: independence encouraged  Medication Review/Management: medications reviewed     Problem: Oral Intake Inadequate  Goal: Improved Oral Intake  Outcome: Ongoing, Progressing  Intervention: Promote and Optimize Oral Intake  Flowsheets (Taken 3/18/2024 1314)  Oral Nutrition Promotion: safe use of adaptive equipment encouraged

## 2024-03-18 NOTE — PT/OT/SLP PROGRESS
Occupational Therapy   Treatment    Name: Roselia Eldridge  MRN: [id number]  Admitting Diagnosis:  Generalized weakness       Recommendations:     Discharge Recommendations:    Discharge Equipment Recommendations:  walker, rolling  Barriers to discharge:       Assessment:     Roselia Eldridge is a 78 y.o. female with a medical diagnosis of Generalized weakness.  She presents with weakness. Performance deficits affecting function are weakness, impaired endurance, impaired self care skills.     Rehab Prognosis:  Good; patient would benefit from acute skilled OT services to address these deficits and reach maximum level of function.       Plan:     Patient to be seen 5 x/week to address the above listed problems via self-care/home management, therapeutic activities, therapeutic exercises  Plan of Care Expires:    Plan of Care Reviewed with: patient    Subjective     Chief Complaint: Pt c/o stuffy nose  Patient/Family Comments/goals: return home  Pain/Comfort:       Objective:     Communicated with: Pt prior to session.  Patient found supine with   upon OT entry to room.    General Precautions: Standard, fall    Orthopedic Precautions:spinal precautions  Braces: TLSO  Respiratory Status: Room air     Occupational Performance:     Bed Mobility:         Functional Mobility/Transfers:    Functional Mobility:     Activities of Daily Living:        [facility] 6 Click ADL:      Treatment & Education:  To increase BUE strength and endurance for improved mobility with transfers and self care skills Pt completed elbow flex and chest press with 2# dowel, sh IR/ER, and tricep press with red Tband 2x15 ea ex with HOB elevated    Patient left supine with call button in reach    GOALS:   Multidisciplinary Problems       Occupational Therapy Goals          Problem: Occupational Therapy    Goal Priority Disciplines Outcome Interventions   Occupational Therapy Goal     OT, PT/OT Ongoing, Progressing    Description: STG:  Pt will perform grooming  with setup  Pt will bathe with Mod I  Pt will perform UE dressing with Mod I  Pt will perform LE dressing with Mod I  Pt will sit EOB x 15 min with supervisoin assistance  Pt will transfer bed/chair/bsc with Mod I  Pt will perform standing task x 15 min with supervision assistance  Pt will tolerate 30 minutes of tx without fatigue      LT.Restore to max I with self care and mobility.                         Time Tracking:     OT Date of Treatment: 24  OT Start Time:   OT Stop Time:   OT Total Time (min): 23 min    Billable Minutes:Therapeutic Exercise 23          Number of JAIRON visits since last OT visit: 1    3/18/2024

## 2024-03-18 NOTE — ASSESSMENT & PLAN NOTE
03/12/24 complains of left neck pain - would not rank pain on pain scale -states it hurts a lot  Chart review shows imaging on 02/16 and botox injection son 02/19/24. Talked with her re what has releived her pain in past- she states botox has helped, ibuprofen helps.   Added ibuprofen and robaxin prn     The cervical vertebral body heights are maintained. There is minimal anterolisthesis of C3 over C4. There is no evidence of significant translational instability on flexion or extension views. Multilevel degenerative changes including mild multilevel   intervertebral disc space loss and mild marginal osteophytosis. There is multilevel degenerative uncovertebral joint arthropathy bilaterally.     03/13/24 robaxin prn  ultram prn pain    03/14/24 reports neck pain is much better with cervical pillow

## 2024-03-18 NOTE — PROGRESS NOTES
[facility] - Medical Surgical Unit  Hospital Medicine  Progress Note    Patient Name: Roselia Eldridge  MRN: [id number]  Patient Class: IP- Swing   Admission Date: 3/11/2024  Length of Stay: 7 days  Attending Physician: Galindo De Jesus IV, DO  Primary Care Provider: Pamela Jurado DO        Subjective:     Principal Problem:Generalized weakness        HPI:  Roselia Eldridge is a 78 year old female with pmh cervical spondylosis, primary hypertension, OAB, mixed hyperlipidemia, depression, insomnia, GERD, CKD, CAD and vitamin D deficiency.   She was admitted to inpatient on 03/06/24 for thoracic pain , right rib pain after fall at home and dehydration. She was given IV fluids and norco for pain. She began to have visual hallucinations after norco. Norco was dcd and back pain was treated with lidoderm patch and ibuprofen.  Denies any hallucinations since Saturday. She continues to have impaired mobility due to T4 compression fracture. She has TLSO brace that she wears while out of bed. States ibuprofen is working well for her back pain. She is agreeable with swing bed for continued therapy. She is wet this morning, hesitant to let nurses clean her- encouraged her to let nurses/ CNAs clean her so her skin will not become irritated and break down- states she is not a child, she will not lay in it that long. She is alert and oriented x 3.     Overview/Hospital Course:  03/12/24 Called to room to check on Mrs. Eldridge- nurse told me that she had just woke up and that she is not talking to her. On my arrival, she is grunting. Asked her to talk to me and she is alert and responsive. She is oriented x 3. She tells me her neck hurts- states she has history of DDD. Asked her to rank pain on scale 1- 10 and she says it hurts a lot. Asked her what usually helps with her pain, states she has gotten botox injections in the past. Asked her if ibuproen has helped and she states it has. Will get dose of ibuprofen and monitor.  She is able to follow all commands, has equal strength bilaterally.  Review of records show imaging on 02/16/24.-The cervical vertebral body heights are maintained. There is minimal anterolisthesis of C3 over C4. There is no evidence of significant translational instability on flexion or extension views. Multilevel degenerative changes including mild multilevel   intervertebral disc space loss and mild marginal osteophytosis. There is multilevel degenerative uncovertebral joint arthropathy bilaterally. Also shows botox injections to neck on 02/19/24.   Consulted with Dr. De Jesus re neck pain.     03/13/24 Awake and resting in bed. Complains of neck pain - states she always has this pain but that robaxin and ultram have helped. Requesting robaxin this morning. Talked with her re therapy - States she was able to walk some yesterday- records show she walked 10 feet twice. Encouraged her to continue to participate and do more as she is able.  She has o2 per nc - does not use this at home - will wean to room air as she tolerates. Ate 100% of breakfast served.      03/14/24 Awake and resting in bed. Complains of pain at base of neck with radiation of pain to shoulders. States she took baclofen at hs while at home and that helped. She is on robaxin now. Had increase in AST/ALT  a few days ago . Statin and ibuprofen dcd. Now normal. She is requesting motrin for neck pain. Will order BID prn and watch labs. Complains of dry eyes. States she uses artificial tears at home - ordered this for her. Continue therapy for strengthening.      0955 went back to check on Mrs. Eldridge. She states she is comfortable now. Denies neck pain and headache.      03/15/24 Awake and resting in bed. States neck pain is better but continues to have some pain at base of neck. Reports good appetite. She reports using the bed pan. Talked with her re the importance of functioning to the highest level that she is able, including getting out of the bed to  go to the toilet. Her goal is to return home alone. Asked her to do more with therapy and to call us when she needs to go to the toilet.    03/18/24 Resting in bed. Complains of nasal congestion. States neck pain is much better. Walked total of 253 feet with therapist today. Continue work on balance and safety.    Interval History: weakness    Review of Systems   Constitutional:  Negative for appetite change and fatigue.   HENT:  Negative for sinus pressure, sinus pain and trouble swallowing.    Eyes:  Negative for discharge and itching.   Respiratory:  Negative for cough.    Cardiovascular:  Negative for chest pain and palpitations.   Gastrointestinal:  Negative for constipation, diarrhea, nausea and vomiting.   Genitourinary:  Negative for difficulty urinating and dysuria.        OAB    Musculoskeletal:  Positive for arthralgias. Negative for back pain and joint swelling.        Reports neck pain is much better    Skin:  Negative for color change.   Neurological:  Positive for weakness. Negative for light-headedness and headaches.   Psychiatric/Behavioral:  Negative for hallucinations.         History of major depression   On sertraline      Objective:     Vital Signs (Most Recent):  Temp: 97.4 °F (36.3 °C) (03/18/24 0712)  Pulse: 62 (03/18/24 0712)  Resp: 20 (03/18/24 0712)  BP: 136/75 (03/18/24 0712)  SpO2: 95 % (03/18/24 0712) Vital Signs (24h Range):  Temp:  [97.4 °F (36.3 °C)-97.6 °F (36.4 °C)] 97.4 °F (36.3 °C)  Pulse:  [60-66] 62  Resp:  [18-20] 20  SpO2:  [93 %-96 %] 95 %  BP: (125-136)/(60-75) 136/75     Weight:  (would not let me weight her. stated put the same weight she was when  she came here..)  Body mass index is 31.97 kg/m².    Intake/Output Summary (Last 24 hours) at 3/18/2024 1333  Last data filed at 3/18/2024 1316  Gross per 24 hour   Intake 480 ml   Output --   Net 480 ml         Physical Exam  HENT:      Head: Normocephalic.      Nose: Nose normal.      Mouth/Throat:      Mouth: Mucous  membranes are moist.   Eyes:      Pupils: Pupils are equal, round, and reactive to light.   Neck:     Cardiovascular:      Rate and Rhythm: Normal rate and regular rhythm.      Pulses: Normal pulses.      Heart sounds: Normal heart sounds.   Pulmonary:      Effort: Pulmonary effort is normal. No respiratory distress.      Breath sounds: Normal breath sounds. No stridor. No wheezing or rhonchi.   Abdominal:      General: Bowel sounds are normal. There is no distension.      Palpations: Abdomen is soft. There is no mass.      Tenderness: There is no abdominal tenderness. There is no guarding or rebound.      Hernia: No hernia is present.   Musculoskeletal:         General: No swelling, tenderness or signs of injury.      Cervical back: Muscular tenderness present.      Right lower leg: No edema.      Left lower leg: No edema.      Comments: Reports neck pain is much better   Skin:     General: Skin is warm and dry.   Neurological:      Mental Status: She is alert and oriented to person, place, and time.      Motor: Weakness present.             Significant Labs: All pertinent labs within the past 24 hours have been reviewed.  Recent Lab Results         03/18/24  0555        Bands 11       Baso # 0.04       Basophil % 0.6       Differential Method Manual       Eos # 0.32       Eos % 5.1        4       Hematocrit 28.3       Hemoglobin 8.9       Lymph # 1.73       Lymph % 27.4        27       Magnesium  1.7       MCH 31.0       MCHC 31.4       MCV 98.6       Mono # 0.49       Mono % 7.8        6       MPV 9.0       Neutrophils, Abs 3.74       Neutrophils Relative 59.1       nRBC       Phosphorus Level 3.1       PLATELET MORPHOLOGY Normal       Platelet Count 330       RBC 2.87       RBC Morphology Normal       RDW 12.8       Segmented Neutrophils, Man % 52       WBC 6.32               Significant Imaging: I have reviewed all pertinent imaging results/findings within the past 24 hours.    Assessment/Plan:      *  Generalized weakness    03/11/24 t4 compression fracture   TLSO brace  while out of bed   Fall precautions       03/12/24 ambulated 48 feet with therapist yesterday    03/13/24 walked 10 feet twice yesterday    03/14/24 continue therapy    03/15/24 continue therapy, encouraged Mrs. Eldridge to put more effort in to therapy- to participate an full effort    03/18/24 walked 253 feet with therapist     Thoracic compression fracture, sequela  03/11/24   TLSO brace while out of bed  Ibuprofen and lidoderm patch for pain relief   Fall precautions      03/12/24 continue TLSO brace while out of bed  Ibuproen and lidoderm patch    03/14/24 TLSO brace while out of bed     Impaired mobility and ADLs    03/11/24 T 4 compression fracture  TLSO brace while out of bed  PT and OT to evaluate and treat     Acute midline back pain  03/11/24 lidoderm patch and ibuprofen for relief       Primary hypertension  Chronic, controlled. Latest blood pressure and vitals reviewed-     Temp:  [97.4 °F (36.3 °C)-98.2 °F (36.8 °C)]   Pulse:  [57-75]   Resp:  [18-20]   BP: (108-136)/(55-71)   SpO2:  [94 %-98 %] .   Home meds for hypertension were reviewed and noted below.   Hypertension Medications               chlorthalidone (HYGROTEN) 25 MG Tab Take 25 mg by mouth.    metoprolol succinate (TOPROL-XL) 50 MG 24 hr tablet Take 1 tablet by mouth 2 (two) times daily.            While in the hospital, will manage blood pressure as follows; Continue home antihypertensive regimen    Will utilize p.r.n. blood pressure medication only if patient's blood pressure greater than 180/110 and she develops symptoms such as worsening chest pain or shortness of breath.      03/12/24 stable    GERD (gastroesophageal reflux disease)    03/11/24 continue protonix    Poor appetite    03/11/24 improving, ate 100% of breakfast served this morning    03/15/24 reports good appetite this morning    Dyslipidemia  03/11/24 continue statin       Depression  Hx of major  depression   Controlled - continue sertraline       03/12/24 continue sertraline       Neck pain  03/12/24 complains of left neck pain - would not rank pain on pain scale -states it hurts a lot  Chart review shows imaging on 02/16 and botox injection son 02/19/24. Talked with her re what has releived her pain in past- she states botox has helped, ibuprofen helps.   Added ibuprofen and robaxin prn     The cervical vertebral body heights are maintained. There is minimal anterolisthesis of C3 over C4. There is no evidence of significant translational instability on flexion or extension views. Multilevel degenerative changes including mild multilevel   intervertebral disc space loss and mild marginal osteophytosis. There is multilevel degenerative uncovertebral joint arthropathy bilaterally.     03/13/24 robaxin prn  ultram prn pain    03/14/24 reports neck pain is much better with cervical pillow       VTE Risk Mitigation (From admission, onward)           Ordered     enoxaparin injection 40 mg  Every 24 hours         03/11/24 1248                    Discharge Planning   MARILEE: 3/28/2024     Code Status: DNR   Is the patient medically ready for discharge?:     Reason for patient still in hospital (select all that apply): Treatment                     LESLEE Yoo  Department of Hospital Medicine   [facility] - Medical Surgical Unit

## 2024-03-18 NOTE — SUBJECTIVE & OBJECTIVE
Interval History: weakness    Review of Systems   Constitutional:  Negative for appetite change and fatigue.   HENT:  Negative for sinus pressure, sinus pain and trouble swallowing.    Eyes:  Negative for discharge and itching.   Respiratory:  Negative for cough.    Cardiovascular:  Negative for chest pain and palpitations.   Gastrointestinal:  Negative for constipation, diarrhea, nausea and vomiting.   Genitourinary:  Negative for difficulty urinating and dysuria.        OAB    Musculoskeletal:  Positive for arthralgias. Negative for back pain and joint swelling.        Reports neck pain is much better    Skin:  Negative for color change.   Neurological:  Positive for weakness. Negative for light-headedness and headaches.   Psychiatric/Behavioral:  Negative for hallucinations.         History of major depression   On sertraline      Objective:     Vital Signs (Most Recent):  Temp: 97.4 °F (36.3 °C) (03/18/24 0712)  Pulse: 62 (03/18/24 0712)  Resp: 20 (03/18/24 0712)  BP: 136/75 (03/18/24 0712)  SpO2: 95 % (03/18/24 0712) Vital Signs (24h Range):  Temp:  [97.4 °F (36.3 °C)-97.6 °F (36.4 °C)] 97.4 °F (36.3 °C)  Pulse:  [60-66] 62  Resp:  [18-20] 20  SpO2:  [93 %-96 %] 95 %  BP: (125-136)/(60-75) 136/75     Weight:  (would not let me weight her. stated put the same weight she was when  she came here..)  Body mass index is 31.97 kg/m².    Intake/Output Summary (Last 24 hours) at 3/18/2024 1333  Last data filed at 3/18/2024 1316  Gross per 24 hour   Intake 480 ml   Output --   Net 480 ml         Physical Exam  HENT:      Head: Normocephalic.      Nose: Nose normal.      Mouth/Throat:      Mouth: Mucous membranes are moist.   Eyes:      Pupils: Pupils are equal, round, and reactive to light.   Neck:     Cardiovascular:      Rate and Rhythm: Normal rate and regular rhythm.      Pulses: Normal pulses.      Heart sounds: Normal heart sounds.   Pulmonary:      Effort: Pulmonary effort is normal. No respiratory distress.       Breath sounds: Normal breath sounds. No stridor. No wheezing or rhonchi.   Abdominal:      General: Bowel sounds are normal. There is no distension.      Palpations: Abdomen is soft. There is no mass.      Tenderness: There is no abdominal tenderness. There is no guarding or rebound.      Hernia: No hernia is present.   Musculoskeletal:         General: No swelling, tenderness or signs of injury.      Cervical back: Muscular tenderness present.      Right lower leg: No edema.      Left lower leg: No edema.      Comments: Reports neck pain is much better   Skin:     General: Skin is warm and dry.   Neurological:      Mental Status: She is alert and oriented to person, place, and time.      Motor: Weakness present.             Significant Labs: All pertinent labs within the past 24 hours have been reviewed.  Recent Lab Results         03/18/24  0555        Bands 11       Baso # 0.04       Basophil % 0.6       Differential Method Manual       Eos # 0.32       Eos % 5.1        4       Hematocrit 28.3       Hemoglobin 8.9       Lymph # 1.73       Lymph % 27.4        27       Magnesium  1.7       MCH 31.0       MCHC 31.4       MCV 98.6       Mono # 0.49       Mono % 7.8        6       MPV 9.0       Neutrophils, Abs 3.74       Neutrophils Relative 59.1       nRBC       Phosphorus Level 3.1       PLATELET MORPHOLOGY Normal       Platelet Count 330       RBC 2.87       RBC Morphology Normal       RDW 12.8       Segmented Neutrophils, Man % 52       WBC 6.32               Significant Imaging: I have reviewed all pertinent imaging results/findings within the past 24 hours.

## 2024-03-19 PROCEDURE — 27000982 HC MATTRESS, MATRIX LAL RENTAL

## 2024-03-19 PROCEDURE — 25000003 PHARM REV CODE 250: Performed by: NURSE PRACTITIONER

## 2024-03-19 PROCEDURE — 99308 SBSQ NF CARE LOW MDM 20: CPT | Mod: ,,, | Performed by: FAMILY MEDICINE

## 2024-03-19 PROCEDURE — 97530 THERAPEUTIC ACTIVITIES: CPT

## 2024-03-19 PROCEDURE — 97110 THERAPEUTIC EXERCISES: CPT

## 2024-03-19 PROCEDURE — 27000944

## 2024-03-19 PROCEDURE — 25000003 PHARM REV CODE 250: Performed by: FAMILY MEDICINE

## 2024-03-19 PROCEDURE — 63600175 PHARM REV CODE 636 W HCPCS: Performed by: NURSE PRACTITIONER

## 2024-03-19 PROCEDURE — 11000004 HC SNF PRIVATE

## 2024-03-19 PROCEDURE — 97110 THERAPEUTIC EXERCISES: CPT | Mod: CQ

## 2024-03-19 PROCEDURE — 94761 N-INVAS EAR/PLS OXIMETRY MLT: CPT

## 2024-03-19 PROCEDURE — 97530 THERAPEUTIC ACTIVITIES: CPT | Mod: CQ

## 2024-03-19 PROCEDURE — 97116 GAIT TRAINING THERAPY: CPT | Mod: CQ

## 2024-03-19 RX ORDER — DOXYLAMINE SUCCINATE 25 MG
TABLET ORAL 2 TIMES DAILY
Status: DISCONTINUED | OUTPATIENT
Start: 2024-03-19 | End: 2024-03-22 | Stop reason: HOSPADM

## 2024-03-19 RX ADMIN — PHENYLEPHRINE HYDROCHLORIDE 1 SPRAY: 10 SPRAY NASAL at 02:03

## 2024-03-19 RX ADMIN — Medication 5000 UNITS: at 08:03

## 2024-03-19 RX ADMIN — ENOXAPARIN SODIUM 40 MG: 40 INJECTION SUBCUTANEOUS at 04:03

## 2024-03-19 RX ADMIN — SENNOSIDES AND DOCUSATE SODIUM 1 TABLET: 8.6; 5 TABLET ORAL at 08:03

## 2024-03-19 RX ADMIN — SERTRALINE HYDROCHLORIDE 100 MG: 50 TABLET ORAL at 08:03

## 2024-03-19 RX ADMIN — PHENYLEPHRINE HYDROCHLORIDE 1 SPRAY: 10 SPRAY NASAL at 08:03

## 2024-03-19 RX ADMIN — PANTOPRAZOLE SODIUM 40 MG: 40 TABLET, DELAYED RELEASE ORAL at 08:03

## 2024-03-19 RX ADMIN — LIDOCAINE 1 PATCH: 50 PATCH CUTANEOUS at 02:03

## 2024-03-19 RX ADMIN — OXYBUTYNIN CHLORIDE 10 MG: 5 TABLET, EXTENDED RELEASE ORAL at 08:03

## 2024-03-19 RX ADMIN — METOPROLOL SUCCINATE 50 MG: 50 TABLET, EXTENDED RELEASE ORAL at 08:03

## 2024-03-19 RX ADMIN — MELATONIN TAB 3 MG 6 MG: 3 TAB at 08:03

## 2024-03-19 RX ADMIN — MICONAZOLE NITRATE: 20 CREAM TOPICAL at 08:03

## 2024-03-19 RX ADMIN — Medication 400 MG: at 08:03

## 2024-03-19 RX ADMIN — FLUTICASONE PROPIONATE 100 MCG: 50 SPRAY, METERED NASAL at 08:03

## 2024-03-19 RX ADMIN — ASPIRIN 325 MG ORAL TABLET 325 MG: 325 PILL ORAL at 08:03

## 2024-03-19 NOTE — PLAN OF CARE
[facility] - Medical Surgical Unit - Swing Bed   Interdisciplinary Team Meeting    Patient: Roselia Eldridge   Today's Date: 3/19/2024   Estimated D/C Date: 3/28/2024        Physician: Galindo De Jesus MD Nurse Practitioner: Aliyah Yu NP   Pharmacy: Nkechi Branstetter, PharmD Unit Director: DAMARIS Ko   :  Aimee Pinon RN Physical/Occupational Therapy: Allyn Vega PTA   Speech Therapy: THELMA Activity Therapy: Sophia Babb LPN   Nursing: DAMARIS Ko  Respiratory: Dionisio Ho,  Dietary: Kelly Corral  Other:      Nursing  New Symptoms/Problems:   Last Bowel Movement: 03/19/24   Urine: incontinent  Osei: No   Bowel: continent  Constipated: No  Diarrhea: No   Isolation: No  Cognition: WNL  Aspiration Precautions:No  Wound Care: No  Wound Location/Tx:   Comment(s):     Respiratory   O2 Device: Room Air  O2 Flow:   SpO2: 95%  Neb Tx: Yes  Comment(s): nebs ordered prn     Dietary  Nutrition: Regular  Comment(s):     Speech Therapy  Speech/Swallowing: No current speech or swallowing issues  Comment(s):     Physical Therapy  Gait/Assistive Device: 140 x 2 trials w/ RW w/CGA ELOS: Plan to DC 3/28/2024    Transfers: Contact Guard Assistance  Bed Mobility: Standby Assistance Range of Motion/Restrictions: TLSO brace  Comment(s):      Occupational Therapy  Eating/Grooming: Independent Toileting: Maximum Assistance   Bathing: Maximum Assistance Dressing (Upper Body): Moderate Assistance   Dressing (Lower Body): Maximum Assistance Comment(s):      Activity Therapy  Level of participation: Active participation  Comment(s):     Pharmacy  Medication Changes (see MD orders in chart): Yes  Labs Reviewed: Yes  New Lab Orders: Yes  Comment(s): CBC, Mg, Ph every M/Th      Tx Plan/Recommendations reviewed with family and/or patient on (date) 03/14/2024.  Additional family Conference/Training: N/A  D/C Plan/Recommendations: Home with HH  MARILEE: 3/28/2024  Comment(s):

## 2024-03-19 NOTE — ASSESSMENT & PLAN NOTE
03/11/24 improving, ate 100% of breakfast served this morning    03/15/24 reports good appetite this morning    03/18/24 improved

## 2024-03-19 NOTE — PLAN OF CARE
Problem: Adult Inpatient Plan of Care  Goal: Plan of Care Review  Outcome: Ongoing, Progressing     Problem: Adult Inpatient Plan of Care  Goal: Absence of Hospital-Acquired Illness or Injury  Outcome: Ongoing, Progressing     Problem: Adult Inpatient Plan of Care  Goal: Optimal Comfort and Wellbeing  Outcome: Ongoing, Progressing     Problem: Adult Inpatient Plan of Care  Goal: Readiness for Transition of Care  Outcome: Ongoing, Progressing     Problem: Skin Injury Risk Increased  Goal: Skin Health and Integrity  Outcome: Ongoing, Progressing     Problem: Fall Injury Risk  Goal: Absence of Fall and Fall-Related Injury  Outcome: Ongoing, Progressing     Problem: Oral Intake Inadequate  Goal: Improved Oral Intake  Outcome: Ongoing, Progressing

## 2024-03-19 NOTE — PT/OT/SLP PROGRESS
Occupational Therapy   Treatment    Name: Roselia Eldridge  MRN: [id number]  Admitting Diagnosis:  Generalized weakness       Recommendations:     Discharge Recommendations:    Discharge Equipment Recommendations:  walker, rolling  Barriers to discharge:       Assessment:     Roselia Eldridge is a 78 y.o. female with a medical diagnosis of Generalized weakness.  She presents with weakenss. Performance deficits affecting function are weakness, impaired endurance, impaired self care skills.     Rehab Prognosis:  Fair; patient would benefit from acute skilled OT services to address these deficits and reach maximum level of function.       Plan:     Patient to be seen 5 x/week to address the above listed problems via self-care/home management, therapeutic activities, therapeutic exercises  Plan of Care Expires:    Plan of Care Reviewed with: patient    Subjective     Chief Complaint: Pain and weakness  Patient/Family Comments/goals: to get better  Pain/Comfort:       Objective:     Communicated with: Nurse prior to session.  Patient found supine with   upon OT entry to room.    General Precautions: Standard, fall    Orthopedic Precautions:spinal precautions  Braces: TLSO  Respiratory Status: Room air     Occupational Performance:     Bed Mobility:    Patient completed Rolling/Turning to Left with  contact guard assistance  Patient completed Rolling/Turning to Right with contact guard assistance  Patient completed Scooting/Bridging with contact guard assistance  Patient completed Supine to Sit with contact guard assistance  Patient completed Sit to Supine with contact guard assistance     Functional Mobility/Transfers:  Patient completed Sit <> Stand Transfer with contact guard assistance  with  rolling walker   Patient completed Bed <> Chair Transfer using Step Transfer technique with contact guard assistance with rolling walker  Functional Mobility: Patient transferred within room with minimal difficulty    Activities of Daily  Living:  Upper Body Dressing: minimum assistance to [facility] gown as a robe  Lower Body Dressing: maximal assistance to nora socks      [facility] 6 Click ADL:      Treatment & Education:  Pt performed B UE strengthening exercises to include:   UE bike x 5 min  Chest press    Elbow flexion   Elbow extension   Pectoral stretches   Bilateral rowing   Supination/pronation    Wrist flexion/extension  All exercises performed 3x10 reps.     Patient left up in chair with all lines intact, call button in reach, and chair alarm on    GOALS:   Multidisciplinary Problems       Occupational Therapy Goals          Problem: Occupational Therapy    Goal Priority Disciplines Outcome Interventions   Occupational Therapy Goal     OT, PT/OT Ongoing, Progressing    Description: STG:  Pt will perform grooming with setup  Pt will bathe with Mod I  Pt will perform UE dressing with Mod I  Pt will perform LE dressing with Mod I  Pt will sit EOB x 15 min with supervisoin assistance  Pt will transfer bed/chair/bsc with Mod I  Pt will perform standing task x 15 min with supervision assistance  Pt will tolerate 30 minutes of tx without fatigue      LT.Restore to max I with self care and mobility.                         Time Tracking:     OT Date of Treatment: 24  OT Start Time: 1300  OT Stop Time: 1341  OT Total Time (min): 41 min    Billable Minutes:Therapeutic Activity 11 min  Therapeutic Exercise 30 min          Number of JAIRON visits since last OT visit: 1  JANENE Garibay/RONNY, CSRS  3/19/2024

## 2024-03-19 NOTE — ASSESSMENT & PLAN NOTE
03/11/24   TLSO brace while out of bed  Ibuprofen and lidoderm patch for pain relief   Fall precautions      03/12/24 continue TLSO brace while out of bed  Ibuproen and lidoderm patch    03/14/24 TLSO brace while out of bed       03/19/24 no complaints of pain

## 2024-03-19 NOTE — PT/OT/SLP PROGRESS
Physical Therapy Treatment    Patient Name:  Roselia Eldridge   MRN:  [id number]    Recommendations:     Discharge Recommendations: Low Intensity Therapy  Discharge Equipment Recommendations: walker, rolling  Barriers to discharge: Decreased caregiver support    Assessment:     Roselia Eldridge is a 78 y.o. female admitted with a medical diagnosis of Generalized weakness.  She presents with the following impairments/functional limitations: weakness, impaired endurance, impaired self care skills, impaired functional mobility, gait instability, impaired balance, decreased lower extremity function, pain Patient was willing to participate with therapy today. Patient with noted improvements in ambulation distance today.     Rehab Prognosis: Fair; patient would benefit from acute skilled PT services to address these deficits and reach maximum level of function.    Recent Surgery: * No surgery found *      Plan:     During this hospitalization, patient to be seen 5 x/week to address the identified rehab impairments via gait training, therapeutic activities, therapeutic exercises, neuromuscular re-education and progress toward the following goals:    Plan of Care Expires:  03/28/24    Subjective     Chief Complaint: weakness  Patient/Family Comments/goals: return home  Pain/Comfort:  Pain Rating 1: 0/10  Pain Rating Post-Intervention 1: 0/10      Objective:     Communicated with nurse prior to session.  Patient found HOB elevated with   upon PT entry to room.     General Precautions: Standard, fall  Orthopedic Precautions: spinal precautions  Braces: TLSO  Respiratory Status: Room air     Functional Mobility:  Bed Mobility:     Supine to Sit: stand by assistance  Transfers:     Sit to Stand:  contact guard assistance with rolling walker  Bed to Chair: contact guard assistance with  rolling walker  using  Step Transfer  Toilet Transfer: contact guard assistance with  rolling walker  using  Step Transfer  Gait: Patient ambulated 140'  x 2 with RW and CGA. Patient with no noted LOB.      AM-PAC 6 CLICK MOBILITY  Turning over in bed (including adjusting bedclothes, sheets and blankets)?: 4  Sitting down on and standing up from a chair with arms (e.g., wheelchair, bedside commode, etc.): 3  Moving from lying on back to sitting on the side of the bed?: 4  Moving to and from a bed to a chair (including a wheelchair)?: 3  Need to walk in hospital room?: 3  Climbing 3-5 steps with a railing?: 3  Basic Mobility Total Score: 20       Treatment & Education:  Transfers and ambulation as listed above.   Long arc quads - 15 reps   Seated hip flexion - 15 reps  Hip abduction - 15 reps   Hip adduction - 15 reps  Straight leg raises - 15 reps   (All exercises performed on BLE.)    Patient left up in chair with call button in reach and chair alarm on..    GOALS:   Multidisciplinary Problems       Physical Therapy Goals          Problem: Physical Therapy    Goal Priority Disciplines Outcome Goal Variances Interventions   Physical Therapy Goal     PT, PT/OT Ongoing, Progressing     Description: Short-term Goals: 1.5 weeks  1. Patient will perform supine to/from sit with minimal assistance x 2 people to improve independence and safety with bed mobility.  2. Patient will perform sit to/from stand with a rolling walker with minimal assistance to improve independence and safety with transfers.  3. Patient will ambulate 150 feet with a rolling walker with contact guard assist to improve independence and safety with gait.  4. Patient will tolerate 15 minutes of physical therapy intervention to improve endurance and activity tolerance.    Long-term goals: 3 weeks  1. Patient will perform supine to/from sit with minimal assistance to improve independence and safety with bed mobility.  2. Patient will perform sit to/from stand with a rolling walker with contact guard assist to improve independence and safety with transfers.  3. Patient will ambulate 300 feet with a rolling  walker with standby assist to improve independence and safety with gait.  4. Patient will tolerate 30 minutes of physical therapy intervention to improve endurance and activity tolerance.                         Time Tracking:     PT Received On: 03/19/24  PT Start Time: 1052     PT Stop Time: 1145  PT Total Time (min): 53 min     Billable Minutes: Gait Training 23, Therapeutic Activity 15, and Therapeutic Exercise 15    Treatment Type: Treatment  PT/PTA: PTA     Number of PTA visits since last PT visit: 1   ROCHELLE Velásquez   03/19/2024

## 2024-03-19 NOTE — PLAN OF CARE
Problem: Adult Inpatient Plan of Care  Goal: Plan of Care Review  Outcome: Ongoing, Progressing  Goal: Absence of Hospital-Acquired Illness or Injury  Outcome: Ongoing, Progressing  Goal: Optimal Comfort and Wellbeing  Outcome: Ongoing, Progressing  Goal: Readiness for Transition of Care  Outcome: Ongoing, Progressing  Intervention: Mutually Develop Transition Plan  Flowsheets (Taken 3/19/2024 1432)  Transportation Anticipated: family or friend will provide     Problem: Skin Injury Risk Increased  Goal: Skin Health and Integrity  Outcome: Ongoing, Progressing  Intervention: Promote and Optimize Oral Intake  Flowsheets (Taken 3/19/2024 1432)  Oral Nutrition Promotion: safe use of adaptive equipment encouraged     Problem: Fall Injury Risk  Goal: Absence of Fall and Fall-Related Injury  Outcome: Ongoing, Progressing  Intervention: Identify and Manage Contributors  Flowsheets (Taken 3/19/2024 1432)  Self-Care Promotion: independence encouraged  Medication Review/Management: medications reviewed     Problem: Oral Intake Inadequate  Goal: Improved Oral Intake  Outcome: Ongoing, Progressing  Intervention: Promote and Optimize Oral Intake  Flowsheets (Taken 3/19/2024 1432)  Oral Nutrition Promotion: safe use of adaptive equipment encouraged

## 2024-03-19 NOTE — ASSESSMENT & PLAN NOTE
03/11/24 t4 compression fracture   TLSO brace  while out of bed   Fall precautions       03/12/24 ambulated 48 feet with therapist yesterday    03/13/24 walked 10 feet twice yesterday    03/14/24 continue therapy    03/15/24 continue therapy, encouraged Mrs. Eldridge to put more effort in to therapy- to participate an full effort    03/18/24 walked 253 feet with therapist     03/19/24 improving, continue to work on balance and safety

## 2024-03-19 NOTE — PROGRESS NOTES
[facility] - Medical Surgical Unit  Hospital Medicine  Progress Note    Patient Name: Roselia Eldridge  MRN: [id number]  Patient Class: IP- Swing   Admission Date: 3/11/2024  Length of Stay: 8 days  Attending Physician: Galindo De Jesus IV, DO  Primary Care Provider: Pamela Jurado DO        Subjective:     Principal Problem:Generalized weakness        HPI:  Roselia lEdridge is a 78 year old female with pmh cervical spondylosis, primary hypertension, OAB, mixed hyperlipidemia, depression, insomnia, GERD, CKD, CAD and vitamin D deficiency.   She was admitted to inpatient on 03/06/24 for thoracic pain , right rib pain after fall at home and dehydration. She was given IV fluids and norco for pain. She began to have visual hallucinations after norco. Norco was dcd and back pain was treated with lidoderm patch and ibuprofen.  Denies any hallucinations since Saturday. She continues to have impaired mobility due to T4 compression fracture. She has TLSO brace that she wears while out of bed. States ibuprofen is working well for her back pain. She is agreeable with swing bed for continued therapy. She is wet this morning, hesitant to let nurses clean her- encouraged her to let nurses/ CNAs clean her so her skin will not become irritated and break down- states she is not a child, she will not lay in it that long. She is alert and oriented x 3.     Overview/Hospital Course:  03/12/24 Called to room to check on Mrs. Eldridge- nurse told me that she had just woke up and that she is not talking to her. On my arrival, she is grunting. Asked her to talk to me and she is alert and responsive. She is oriented x 3. She tells me her neck hurts- states she has history of DDD. Asked her to rank pain on scale 1- 10 and she says it hurts a lot. Asked her what usually helps with her pain, states she has gotten botox injections in the past. Asked her if ibuproen has helped and she states it has. Will get dose of ibuprofen and monitor.  She is able to follow all commands, has equal strength bilaterally.  Review of records show imaging on 02/16/24.-The cervical vertebral body heights are maintained. There is minimal anterolisthesis of C3 over C4. There is no evidence of significant translational instability on flexion or extension views. Multilevel degenerative changes including mild multilevel   intervertebral disc space loss and mild marginal osteophytosis. There is multilevel degenerative uncovertebral joint arthropathy bilaterally. Also shows botox injections to neck on 02/19/24.   Consulted with Dr. De Jesus re neck pain.     03/13/24 Awake and resting in bed. Complains of neck pain - states she always has this pain but that robaxin and ultram have helped. Requesting robaxin this morning. Talked with her re therapy - States she was able to walk some yesterday- records show she walked 10 feet twice. Encouraged her to continue to participate and do more as she is able.  She has o2 per nc - does not use this at home - will wean to room air as she tolerates. Ate 100% of breakfast served.      03/14/24 Awake and resting in bed. Complains of pain at base of neck with radiation of pain to shoulders. States she took baclofen at hs while at home and that helped. She is on robaxin now. Had increase in AST/ALT  a few days ago . Statin and ibuprofen dcd. Now normal. She is requesting motrin for neck pain. Will order BID prn and watch labs. Complains of dry eyes. States she uses artificial tears at home - ordered this for her. Continue therapy for strengthening.      0955 went back to check on Mrs. Eldridge. She states she is comfortable now. Denies neck pain and headache.      03/15/24 Awake and resting in bed. States neck pain is better but continues to have some pain at base of neck. Reports good appetite. She reports using the bed pan. Talked with her re the importance of functioning to the highest level that she is able, including getting out of the bed to  go to the toilet. Her goal is to return home alone. Asked her to do more with therapy and to call us when she needs to go to the toilet.    03/18/24 Resting in bed. Complains of nasal congestion. States neck pain is much better. Walked total of 253 feet with therapist today. Continue work on balance and safety.    03/19/24 complains of nasal congestion. Added neosynephrine prn. Complains of psoriasis , itching to posterior scalp. Will order ketoconazole cream. Improving with therapy.    Interval History: nasal congestion, psoriasis, itching to posterior scalp    Review of Systems   Constitutional:  Negative for appetite change and fatigue.   HENT:  Negative for sinus pressure, sinus pain and trouble swallowing.         Nasal congestion   Eyes:  Negative for discharge and itching.   Respiratory:  Negative for cough.    Cardiovascular:  Negative for chest pain and palpitations.   Gastrointestinal:  Negative for constipation, diarrhea, nausea and vomiting.   Genitourinary:  Negative for difficulty urinating and dysuria.        OAB    Musculoskeletal:  Positive for arthralgias. Negative for back pain and joint swelling.        Reports neck pain is much better    Skin:  Negative for color change.        Complains of itching to posterior scalp     Neurological:  Positive for weakness. Negative for light-headedness and headaches.   Psychiatric/Behavioral:  Negative for hallucinations.         History of major depression   On sertraline      Objective:     Vital Signs (Most Recent):  Temp: 98.1 °F (36.7 °C) (03/19/24 0723)  Pulse: (!) 51 (03/19/24 0723)  Resp: 20 (03/19/24 0723)  BP: (!) 147/68 (03/19/24 0723)  SpO2: 95 % (03/19/24 0723) Vital Signs (24h Range):  Temp:  [97.5 °F (36.4 °C)-98.1 °F (36.7 °C)] 98.1 °F (36.7 °C)  Pulse:  [51-67] 51  Resp:  [18-20] 20  SpO2:  [93 %-96 %] 95 %  BP: (127-147)/(68-75) 147/68     Weight:  (would not let me weight her. stated put the same weight she was when  she came here..)  Body  mass index is 31.97 kg/m².    Intake/Output Summary (Last 24 hours) at 3/19/2024 1201  Last data filed at 3/19/2024 0854  Gross per 24 hour   Intake 920 ml   Output --   Net 920 ml         Physical Exam  HENT:      Head: Normocephalic.      Nose: Nose normal.      Mouth/Throat:      Mouth: Mucous membranes are moist.   Eyes:      Pupils: Pupils are equal, round, and reactive to light.   Neck:     Cardiovascular:      Rate and Rhythm: Normal rate and regular rhythm.      Pulses: Normal pulses.      Heart sounds: Normal heart sounds.   Pulmonary:      Effort: Pulmonary effort is normal. No respiratory distress.      Breath sounds: Normal breath sounds. No stridor. No wheezing or rhonchi.   Abdominal:      General: Bowel sounds are normal. There is no distension.      Palpations: Abdomen is soft. There is no mass.      Tenderness: There is no abdominal tenderness. There is no guarding or rebound.      Hernia: No hernia is present.   Musculoskeletal:         General: No swelling, tenderness or signs of injury.      Cervical back: Muscular tenderness present.      Right lower leg: No edema.      Left lower leg: No edema.      Comments: Reports neck pain is much better   Skin:     General: Skin is warm and dry.      Comments: Flaking , mild redness to left posterior scalp   Neurological:      Mental Status: She is alert and oriented to person, place, and time.      Motor: Weakness present.             Significant Labs: All pertinent labs within the past 24 hours have been reviewed.  Recent Lab Results         03/18/24  1241        Albumin/Globulin Ratio 0.7       Albumin 2.7       ALP 88       ALT 34       Anion Gap 7       AST 36       BILIRUBIN TOTAL 0.2       BUN 21       BUN/CREAT RATIO 23       Calcium 9.7       Chloride 106       CO2 33       Creatinine 0.91       eGFR 65       Globulin, Total 4.1       Glucose 124       Potassium 4.7       PROTEIN TOTAL 6.8       Sodium 141               Significant Imaging: I have  reviewed all pertinent imaging results/findings within the past 24 hours.    Assessment/Plan:      * Generalized weakness    03/11/24 t4 compression fracture   TLSO brace  while out of bed   Fall precautions       03/12/24 ambulated 48 feet with therapist yesterday    03/13/24 walked 10 feet twice yesterday    03/14/24 continue therapy    03/15/24 continue therapy, encouraged Mrs. Eldridge to put more effort in to therapy- to participate an full effort    03/18/24 walked 253 feet with therapist     03/19/24 improving, continue to work on balance and safety    Thoracic compression fracture, sequela  03/11/24   TLSO brace while out of bed  Ibuprofen and lidoderm patch for pain relief   Fall precautions      03/12/24 continue TLSO brace while out of bed  Ibuproen and lidoderm patch    03/14/24 TLSO brace while out of bed       03/19/24 no complaints of pain     Impaired mobility and ADLs    03/11/24 T 4 compression fracture  TLSO brace while out of bed  PT and OT to evaluate and treat     Acute midline back pain  03/11/24 lidoderm patch and ibuprofen for relief       Primary hypertension  Chronic, controlled. Latest blood pressure and vitals reviewed-     Temp:  [97.4 °F (36.3 °C)-98.2 °F (36.8 °C)]   Pulse:  [57-75]   Resp:  [18-20]   BP: (108-136)/(55-71)   SpO2:  [94 %-98 %] .   Home meds for hypertension were reviewed and noted below.   Hypertension Medications               chlorthalidone (HYGROTEN) 25 MG Tab Take 25 mg by mouth.    metoprolol succinate (TOPROL-XL) 50 MG 24 hr tablet Take 1 tablet by mouth 2 (two) times daily.            While in the hospital, will manage blood pressure as follows; Continue home antihypertensive regimen    Will utilize p.r.n. blood pressure medication only if patient's blood pressure greater than 180/110 and she develops symptoms such as worsening chest pain or shortness of breath.      03/12/24 stable    GERD (gastroesophageal reflux disease)    03/11/24 continue protonix    Poor  appetite    03/11/24 improving, ate 100% of breakfast served this morning    03/15/24 reports good appetite this morning    03/18/24 improved    Dyslipidemia  03/11/24 continue statin       Depression  Hx of major depression   Controlled - continue sertraline       03/12/24 continue sertraline       Neck pain  03/12/24 complains of left neck pain - would not rank pain on pain scale -states it hurts a lot  Chart review shows imaging on 02/16 and botox injection son 02/19/24. Talked with her re what has releived her pain in past- she states botox has helped, ibuprofen helps.   Added ibuprofen and robaxin prn     The cervical vertebral body heights are maintained. There is minimal anterolisthesis of C3 over C4. There is no evidence of significant translational instability on flexion or extension views. Multilevel degenerative changes including mild multilevel   intervertebral disc space loss and mild marginal osteophytosis. There is multilevel degenerative uncovertebral joint arthropathy bilaterally.     03/13/24 robaxin prn  ultram prn pain    03/14/24 reports neck pain is much better with cervical pillow       VTE Risk Mitigation (From admission, onward)           Ordered     enoxaparin injection 40 mg  Every 24 hours         03/11/24 1248                    Discharge Planning   MARILEE: 3/28/2024     Code Status: DNR   Is the patient medically ready for discharge?:     Reason for patient still in hospital (select all that apply): Treatment                     Galindo De Jesus IV, DO  Department of Hospital Medicine   [facility] - Medical Surgical Unit

## 2024-03-19 NOTE — SUBJECTIVE & OBJECTIVE
Interval History: nasal congestion, psoriasis, itching to posterior scalp    Review of Systems   Constitutional:  Negative for appetite change and fatigue.   HENT:  Negative for sinus pressure, sinus pain and trouble swallowing.         Nasal congestion   Eyes:  Negative for discharge and itching.   Respiratory:  Negative for cough.    Cardiovascular:  Negative for chest pain and palpitations.   Gastrointestinal:  Negative for constipation, diarrhea, nausea and vomiting.   Genitourinary:  Negative for difficulty urinating and dysuria.        OAB    Musculoskeletal:  Positive for arthralgias. Negative for back pain and joint swelling.        Reports neck pain is much better    Skin:  Negative for color change.        Complains of itching to posterior scalp     Neurological:  Positive for weakness. Negative for light-headedness and headaches.   Psychiatric/Behavioral:  Negative for hallucinations.         History of major depression   On sertraline      Objective:     Vital Signs (Most Recent):  Temp: 98.1 °F (36.7 °C) (03/19/24 0723)  Pulse: (!) 51 (03/19/24 0723)  Resp: 20 (03/19/24 0723)  BP: (!) 147/68 (03/19/24 0723)  SpO2: 95 % (03/19/24 0723) Vital Signs (24h Range):  Temp:  [97.5 °F (36.4 °C)-98.1 °F (36.7 °C)] 98.1 °F (36.7 °C)  Pulse:  [51-67] 51  Resp:  [18-20] 20  SpO2:  [93 %-96 %] 95 %  BP: (127-147)/(68-75) 147/68     Weight:  (would not let me weight her. stated put the same weight she was when  she came here..)  Body mass index is 31.97 kg/m².    Intake/Output Summary (Last 24 hours) at 3/19/2024 1201  Last data filed at 3/19/2024 0854  Gross per 24 hour   Intake 920 ml   Output --   Net 920 ml         Physical Exam  HENT:      Head: Normocephalic.      Nose: Nose normal.      Mouth/Throat:      Mouth: Mucous membranes are moist.   Eyes:      Pupils: Pupils are equal, round, and reactive to light.   Neck:     Cardiovascular:      Rate and Rhythm: Normal rate and regular rhythm.      Pulses: Normal  pulses.      Heart sounds: Normal heart sounds.   Pulmonary:      Effort: Pulmonary effort is normal. No respiratory distress.      Breath sounds: Normal breath sounds. No stridor. No wheezing or rhonchi.   Abdominal:      General: Bowel sounds are normal. There is no distension.      Palpations: Abdomen is soft. There is no mass.      Tenderness: There is no abdominal tenderness. There is no guarding or rebound.      Hernia: No hernia is present.   Musculoskeletal:         General: No swelling, tenderness or signs of injury.      Cervical back: Muscular tenderness present.      Right lower leg: No edema.      Left lower leg: No edema.      Comments: Reports neck pain is much better   Skin:     General: Skin is warm and dry.      Comments: Flaking , mild redness to left posterior scalp   Neurological:      Mental Status: She is alert and oriented to person, place, and time.      Motor: Weakness present.             Significant Labs: All pertinent labs within the past 24 hours have been reviewed.  Recent Lab Results         03/18/24  1241        Albumin/Globulin Ratio 0.7       Albumin 2.7       ALP 88       ALT 34       Anion Gap 7       AST 36       BILIRUBIN TOTAL 0.2       BUN 21       BUN/CREAT RATIO 23       Calcium 9.7       Chloride 106       CO2 33       Creatinine 0.91       eGFR 65       Globulin, Total 4.1       Glucose 124       Potassium 4.7       PROTEIN TOTAL 6.8       Sodium 141               Significant Imaging: I have reviewed all pertinent imaging results/findings within the past 24 hours.

## 2024-03-20 PROCEDURE — 25000003 PHARM REV CODE 250: Performed by: NURSE PRACTITIONER

## 2024-03-20 PROCEDURE — 97116 GAIT TRAINING THERAPY: CPT | Mod: CQ

## 2024-03-20 PROCEDURE — 99900035 HC TECH TIME PER 15 MIN (STAT)

## 2024-03-20 PROCEDURE — 63600175 PHARM REV CODE 636 W HCPCS: Performed by: NURSE PRACTITIONER

## 2024-03-20 PROCEDURE — 97530 THERAPEUTIC ACTIVITIES: CPT | Mod: CQ

## 2024-03-20 PROCEDURE — 97110 THERAPEUTIC EXERCISES: CPT

## 2024-03-20 PROCEDURE — 27000944

## 2024-03-20 PROCEDURE — 25000003 PHARM REV CODE 250: Performed by: FAMILY MEDICINE

## 2024-03-20 PROCEDURE — 97110 THERAPEUTIC EXERCISES: CPT | Mod: CQ

## 2024-03-20 PROCEDURE — 11000004 HC SNF PRIVATE

## 2024-03-20 PROCEDURE — 27000982 HC MATTRESS, MATRIX LAL RENTAL

## 2024-03-20 PROCEDURE — 94761 N-INVAS EAR/PLS OXIMETRY MLT: CPT

## 2024-03-20 RX ADMIN — Medication 400 MG: at 08:03

## 2024-03-20 RX ADMIN — PANTOPRAZOLE SODIUM 40 MG: 40 TABLET, DELAYED RELEASE ORAL at 08:03

## 2024-03-20 RX ADMIN — MICONAZOLE NITRATE: 20 CREAM TOPICAL at 08:03

## 2024-03-20 RX ADMIN — LIDOCAINE 1 PATCH: 50 PATCH CUTANEOUS at 01:03

## 2024-03-20 RX ADMIN — OXYBUTYNIN CHLORIDE 10 MG: 5 TABLET, EXTENDED RELEASE ORAL at 08:03

## 2024-03-20 RX ADMIN — SENNOSIDES AND DOCUSATE SODIUM 1 TABLET: 8.6; 5 TABLET ORAL at 08:03

## 2024-03-20 RX ADMIN — ENOXAPARIN SODIUM 40 MG: 40 INJECTION SUBCUTANEOUS at 05:03

## 2024-03-20 RX ADMIN — PHENYLEPHRINE HYDROCHLORIDE 1 SPRAY: 10 SPRAY NASAL at 08:03

## 2024-03-20 RX ADMIN — METOPROLOL SUCCINATE 50 MG: 50 TABLET, EXTENDED RELEASE ORAL at 08:03

## 2024-03-20 RX ADMIN — NAPROXEN 500 MG: 250 TABLET ORAL at 01:03

## 2024-03-20 RX ADMIN — MELATONIN TAB 3 MG 6 MG: 3 TAB at 08:03

## 2024-03-20 RX ADMIN — Medication 5000 UNITS: at 08:03

## 2024-03-20 RX ADMIN — SERTRALINE HYDROCHLORIDE 100 MG: 50 TABLET ORAL at 08:03

## 2024-03-20 NOTE — PT/OT/SLP PROGRESS
Occupational Therapy   Treatment    Name: Roselia Eldridge  MRN: [id number]  Admitting Diagnosis:  Generalized weakness       Recommendations:     Discharge Recommendations:    Discharge Equipment Recommendations:  walker, rolling  Barriers to discharge:       Assessment:     Roselia Eldridge is a 78 y.o. female with a medical diagnosis of Generalized weakness.  She presents with weakness and decline with ADLs. Performance deficits affecting function are weakness, impaired endurance, impaired self care skills.     Rehab Prognosis:  Good; patient would benefit from acute skilled OT services to address these deficits and reach maximum level of function.       Plan:     Patient to be seen 5 x/week to address the above listed problems via self-care/home management, therapeutic activities, therapeutic exercises  Plan of Care Expires:    Plan of Care Reviewed with: patient    Subjective     Chief Complaint: Pain and weakness  Patient/Family Comments/goals: to return to prior level of function  Pain/Comfort:       Objective:     Communicated with: Nurse prior to session.  Patient found up in chair with   upon OT entry to room.    General Precautions: Standard, fall    Orthopedic Precautions:spinal precautions  Braces: TLSO  Respiratory Status: Room air     Occupational Performance:     Bed Mobility:    Patient sitting up in a chair upon arrival.     Functional Mobility/Transfers:  Not tested  Functional Mobility: n/a    Activities of Daily Living:  Not tested      [facility] 6 Click ADL:      Treatment & Education:  Pt performed Left UE strengthening exercises to include:   Shoulder flexion   Chest press    Elbow flexion   Elbow extension   Bilateral rowing  All exercises performed 2x15 reps.     Patient left up in chair with all lines intact and call button in reach    GOALS:   Multidisciplinary Problems       Occupational Therapy Goals          Problem: Occupational Therapy    Goal Priority Disciplines Outcome Interventions    Occupational Therapy Goal     OT, PT/OT Ongoing, Progressing    Description: STG:  Pt will perform grooming with setup  Pt will bathe with Mod I  Pt will perform UE dressing with Mod I  Pt will perform LE dressing with Mod I  Pt will sit EOB x 15 min with supervisoin assistance  Pt will transfer bed/chair/bsc with Mod I  Pt will perform standing task x 15 min with supervision assistance  Pt will tolerate 30 minutes of tx without fatigue      LT.Restore to max I with self care and mobility.                         Time Tracking:     OT Date of Treatment: 24  OT Start Time: 1355  OT Stop Time: 1420  OT Total Time (min): 25 min    Billable Minutes:Therapeutic Exercise 25 min          Number of JAIRON visits since last OT visit: 1      JANENE Garibay/L, CSRS  3/20/2024

## 2024-03-20 NOTE — PLAN OF CARE
PT AAOX3. Resp even. Neck pillow noted. Last bowel movement stated for 3/19/24. No c/o pain or discomfort or pain voiced. Bed low. SR up X 2. CB within reach.   Problem: Adult Inpatient Plan of Care  Goal: Plan of Care Review  Outcome: Ongoing, Progressing  Goal: Absence of Hospital-Acquired Illness or Injury  Outcome: Ongoing, Progressing  Goal: Optimal Comfort and Wellbeing  Outcome: Ongoing, Progressing  Goal: Readiness for Transition of Care  Outcome: Ongoing, Progressing     Problem: Skin Injury Risk Increased  Goal: Skin Health and Integrity  Outcome: Ongoing, Progressing     Problem: Fall Injury Risk  Goal: Absence of Fall and Fall-Related Injury  Outcome: Ongoing, Progressing     Problem: Oral Intake Inadequate  Goal: Improved Oral Intake  Outcome: Ongoing, Progressing

## 2024-03-20 NOTE — PT/OT/SLP PROGRESS
Physical Therapy Treatment    Patient Name:  Roselia Eldridge   MRN:  [id number]    Recommendations:     Discharge Recommendations: Low Intensity Therapy  Discharge Equipment Recommendations: walker, rolling  Barriers to discharge: Decreased caregiver support    Assessment:     Roselia Eldridge is a 78 y.o. female admitted with a medical diagnosis of Generalized weakness.  She presents with the following impairments/functional limitations: weakness, impaired endurance, impaired self care skills, impaired functional mobility, gait instability, impaired balance, decreased lower extremity function Patient was willing to participate with therapy today. Patient with noted improvements in ambulation distance today as well as performing the stairs with no issues. Patient expresses her readiness to home at the end of this week.     Rehab Prognosis: Fair; patient would benefit from acute skilled PT services to address these deficits and reach maximum level of function.    Recent Surgery: * No surgery found *      Plan:     During this hospitalization, patient to be seen 5 x/week to address the identified rehab impairments via gait training, therapeutic activities, therapeutic exercises, neuromuscular re-education and progress toward the following goals:    Plan of Care Expires:  03/28/24    Subjective     Chief Complaint: weakness  Patient/Family Comments/goals: return home  Pain/Comfort:  Pain Rating 1: 0/10      Objective:     Communicated with nurse prior to session.  Patient found HOB elevated with   upon PT entry to room.     General Precautions: Standard, fall  Orthopedic Precautions: spinal precautions  Braces: TLSO  Respiratory Status: Room air     Functional Mobility:  Bed Mobility:     Supine to Sit: modified independence  Transfers:     Sit to Stand:  contact guard assistance with rolling walker  Bed to Chair: contact guard assistance with  rolling walker  using  Step Transfer  Toilet Transfer: contact guard assistance  with  rolling walker  using  Step Transfer  Gait: Patient ambulated 230' x 2 with RW and CGA. Patient with no noted LOB.  Stairs:  Pt ascended/descended 6 stair(s) with No Assistive Device with bilateral handrails with Contact Guard Assistance.       AM-PAC 6 CLICK MOBILITY  Turning over in bed (including adjusting bedclothes, sheets and blankets)?: 4  Sitting down on and standing up from a chair with arms (e.g., wheelchair, bedside commode, etc.): 4  Moving from lying on back to sitting on the side of the bed?: 4  Moving to and from a bed to a chair (including a wheelchair)?: 4  Need to walk in hospital room?: 4  Climbing 3-5 steps with a railing?: 3  Basic Mobility Total Score: 23       Treatment & Education:  Transfers and ambulation as listed above.   Nu step - 8 minutes   Stair training (6 total steps)    Patient left up in chair with call button in reach and chair alarm on..    GOALS:   Multidisciplinary Problems       Physical Therapy Goals          Problem: Physical Therapy    Goal Priority Disciplines Outcome Goal Variances Interventions   Physical Therapy Goal     PT, PT/OT Ongoing, Progressing     Description: Short-term Goals: 1.5 weeks  1. Patient will perform supine to/from sit with minimal assistance x 2 people to improve independence and safety with bed mobility.  2. Patient will perform sit to/from stand with a rolling walker with minimal assistance to improve independence and safety with transfers.  3. Patient will ambulate 150 feet with a rolling walker with contact guard assist to improve independence and safety with gait.  4. Patient will tolerate 15 minutes of physical therapy intervention to improve endurance and activity tolerance.    Long-term goals: 3 weeks  1. Patient will perform supine to/from sit with minimal assistance to improve independence and safety with bed mobility.  2. Patient will perform sit to/from stand with a rolling walker with contact guard assist to improve independence  and safety with transfers.  3. Patient will ambulate 300 feet with a rolling walker with standby assist to improve independence and safety with gait.  4. Patient will tolerate 30 minutes of physical therapy intervention to improve endurance and activity tolerance.                         Time Tracking:     PT Received On: 03/20/24  PT Start Time: 0857     PT Stop Time: 0937  PT Total Time (min): 40 min     Billable Minutes: Gait Training 12, Therapeutic Activity 14, and Therapeutic Exercise 18    Treatment Type: Treatment  PT/PTA: PTA     Number of PTA visits since last PT visit: 2   ROCHELLE Velásquez   03/20/2024

## 2024-03-20 NOTE — CONSULTS
"  [facility]  Adult Nutrition  Consult Note    SUMMARY     Recommendations    Recommendation/Intervention: 1. F/U for intake support and add supplement as needed  Goals: 50-75% meals, wt maintenance  Nutrition Goal Status: new    Nutrition diagnosis:  Decreased intake R/T decline in appetite/acute illness AEB recent dehydration, Intake ~50% since admit    3/20/24 Pt R to be weighed.  RDN visited pt this a.m. She denied any complaints re: her diet.  Meal intake improved ~80% meals =1900 kcal, 80g PRO.  Current intake meets intake goals.  RDN enc pt to drink plenty of fluids.      Malnutrition Assessment  Malnutrition Level:  (Based on assessment this pt is not malnourished.)                                    Reason for Assessment    Reason For Assessment: consult (swing bed pt)  Diagnosis:  (recent fall at home, T4 fx, recent dehydration)  Relevant Medical History: Asthma, HTN, HLD  Interdisciplinary Rounds: did not attend  General Information Comments: RDN visited pt this a.m. and pt is somewhat confused.  Dec intake upon admit but intake has improved last several days. She is able to feed herself.  Overall meal intake ~50% since admit.  BSS 17 noted.  last BM 3/10  Nutrition Discharge Planning: New admit to [facility]    Nutrition Risk Screen    Nutrition Risk Screen: no indicators present    Nutrition/Diet History    Patient Reported Diet/Restrictions/Preferences: general  Spiritual, Cultural Beliefs, Jain Practices, Values that Affect Care: no  Food Allergies: NKFA  Factors Affecting Nutritional Intake: decreased appetite    Anthropometrics    Temp: 98.3 °F (36.8 °C)  Height: 5' 5" (165.1 cm)  Height (inches): 65 in  Weight Method: Bed Scale  Weight:  (would not let me weight her. stated put the same weight she was when  she came here..)  Weight (lb): 192.1 lb  Ideal Body Weight (IBW), Female: 125 lb  % Ideal Body Weight, Female (lb): 153.68 %  BMI (Calculated): 32  BMI Grade: " 30 - 34.9- obesity - grade I       Lab/Procedures/Meds    Pertinent Labs Reviewed: reviewed  Pertinent Labs Comments: Hgb 9.5, Hct 28.9, Alb 2.0  Pertinent Medications Reviewed: reviewed  Pertinent Medications Comments: Vitamin D, Protonix, Zoloft         Estimated/Assessed Needs    Weight Used For Calorie Calculations: 56.7 kg (125 lb)  Energy Calorie Requirements (kcal): 3141-5041  Energy Need Method: Kcal/kg  Protein Requirements: 57-63  Weight Used For Protein Calculations: 56.7 kg (125 lb)     Estimated Fluid Requirement Method: other (see comments) (1750)  RDA Method (mL): 1418         Nutrition Prescription Ordered    Current Diet Order: Regular  Nutrition Order Comments: 1. F/U for intake support and encourage intake.  Add supplement as needed    Evaluation of Received Nutrient/Fluid Intake    Energy Calories Required: not meeting needs (Intake improving last 2 days)  Protein Required: not meeting needs (Intake improving last 2 days)  Fluid Required: meeting needs  Tolerance: tolerating  % Intake of Estimated Energy Needs: 50 - 75 %  % Meal Intake: 50 - 75 %    Nutrition Risk    Level of Risk/Frequency of Follow-up: low - moderate       Monitor and Evaluation    Food and Nutrient Intake: food and beverage intake  Food and Nutrient Adminstration: diet order  Anthropometric Measurements: weight  Biochemical Data, Medical Tests and Procedures: electrolyte and renal panel  Nutrition-Focused Physical Findings: overall appearance   Intake should improve with recovery.    LEARNING ASSESSMENT  03/11/2024 1357 [facility] (3/11/2024 - Present)  Created by Mila Jensen, RN - RN (Nurse)Status: Complete  PRIMARY LEARNER   Primary Learner Name: Roselia Eldridge KD - 03/11/2024 1357  Relationship: Patient KD - 03/11/2024 1357  Does the primary learner have any barriers to learning?: No Barriers KD - 03/11/2024 1357  What is the preferred language of the primary learner?: English RAÚL  - 03/11/2024 1357  Is an  required?: No KD - 03/11/2024 1357  How does the primary learner prefer to learn new concepts?: Listening KD - 03/11/2024 1357  How often do you need to have someone help you read instructions, pamphlets, or written material from your doctor or pharmacy?: Never KD - 03/11/2024 1357  CO-LEARNER #1   No question answered  CO-LEARNER #2   No question answered  SPECIAL TOPICS   No question answered  ANSWERED BY:   No question answered  Edit History      SDOH:  Physical Activity     On average, how many days per week do you engage in moderate to strenuous exercise (like a brisk walk)?  Patient declined (P)       On average, how many minutes do you engage in exercise at this level?  Patient declined (P)          Financial Resource Strain     How hard is it for you to pay for the very basics like food, housing, medical care, and heating?  Patient declined (P)          Housing Stability     In the last 12 months, was there a time when you were not able to pay the mortgage or rent on time?  Patient declined (P)       In the last 12 months, was there a time when you did not have a steady place to sleep or slept in a shelter (including now)?  Patient declined (P)          Transportation Needs     In the past 12 months, has lack of transportation kept you from medical appointments or from getting medications?  Patient declined (P)       In the past 12 months, has lack of transportation kept you from meetings, work, or from getting things needed for daily living?  Patient declined (P)          Food Insecurity     Within the past 12 months, you worried that your food would run out before you got the money to buy more.  Patient declined (P)       Within the past 12 months, the food you bought just didn't last and you didn't have money to get more.  Patient declined (P)          Stress     Do you feel stress - tense, restless, nervous, or anxious, or unable to sleep at night because your mind is  troubled all the time - these days?  Patient declined (P)          Social Connections     In a typical week, how many times do you talk on the phone with family, friends, or neighbors?  Patient declined (P)       How often do you get together with friends or relatives?  Patient declined (P)       How often do you attend Buddhism or Rastafarian services?  Patient declined (P)       Do you belong to any clubs or organizations such as Buddhism groups, unions, fraternal or athletic groups, or school groups?  Patient declined (P)       How often do you attend meetings of the clubs or organizations you belong to?  Patient declined (P)       Are you , , , , never , or living with a partner?  Patient declined (P)          Alcohol Use     Q1: How often do you have a drink containing alcohol?  Patient declined (P)       Q2: How many drinks containing alcohol do you have on a typical day when you are drinking?  Patient declined (P)       Q3: How often do you have six or more drinks on one occasion?  Patient declined (P)     Follow-Up    RD Follow-up?: Yes

## 2024-03-21 LAB
ANION GAP SERPL CALCULATED.3IONS-SCNC: 10 MMOL/L (ref 7–16)
BASOPHILS # BLD AUTO: 0.03 K/UL (ref 0–0.2)
BASOPHILS NFR BLD AUTO: 0.5 % (ref 0–1)
BUN SERPL-MCNC: 32 MG/DL (ref 7–18)
BUN/CREAT SERPL: 34 (ref 6–20)
CALCIUM SERPL-MCNC: 8.9 MG/DL (ref 8.5–10.1)
CHLORIDE SERPL-SCNC: 104 MMOL/L (ref 98–107)
CO2 SERPL-SCNC: 28 MMOL/L (ref 21–32)
CREAT SERPL-MCNC: 0.94 MG/DL (ref 0.55–1.02)
DIFFERENTIAL METHOD BLD: ABNORMAL
EGFR (NO RACE VARIABLE) (RUSH/TITUS): 63 ML/MIN/1.73M2
EOSINOPHIL # BLD AUTO: 0.22 K/UL (ref 0–0.5)
EOSINOPHIL NFR BLD AUTO: 3.3 % (ref 1–4)
EOSINOPHIL NFR BLD MANUAL: 8 % (ref 1–4)
ERYTHROCYTE [DISTWIDTH] IN BLOOD BY AUTOMATED COUNT: 12.8 % (ref 11.5–14.5)
GLUCOSE SERPL-MCNC: 99 MG/DL (ref 74–106)
HCT VFR BLD AUTO: 28.6 % (ref 38–47)
HGB BLD-MCNC: 9.1 G/DL (ref 12–16)
HYPOCHROMIA BLD QL SMEAR: ABNORMAL
LYMPHOCYTES # BLD AUTO: 2.24 K/UL (ref 1–4.8)
LYMPHOCYTES NFR BLD AUTO: 33.7 % (ref 27–41)
LYMPHOCYTES NFR BLD MANUAL: 33 % (ref 27–41)
MAGNESIUM SERPL-MCNC: 1.7 MG/DL (ref 1.7–2.3)
MCH RBC QN AUTO: 31.4 PG (ref 27–31)
MCHC RBC AUTO-ENTMCNC: 31.8 G/DL (ref 32–36)
MCV RBC AUTO: 98.6 FL (ref 80–96)
MONOCYTES # BLD AUTO: 0.44 K/UL (ref 0–0.8)
MONOCYTES NFR BLD AUTO: 6.6 % (ref 2–6)
MONOCYTES NFR BLD MANUAL: 6 % (ref 2–6)
MPC BLD CALC-MCNC: 9.2 FL (ref 9.4–12.4)
NEUTROPHILS # BLD AUTO: 3.71 K/UL (ref 1.8–7.7)
NEUTROPHILS NFR BLD AUTO: 55.9 % (ref 53–65)
NEUTS BAND NFR BLD MANUAL: 1 % (ref 1–5)
NEUTS SEG NFR BLD MANUAL: 52 % (ref 50–62)
NRBC BLD MANUAL-RTO: ABNORMAL %
PHOSPHATE SERPL-MCNC: 3.8 MG/DL (ref 2.5–4.5)
PLATELET # BLD AUTO: 274 K/UL (ref 150–400)
PLATELET MORPHOLOGY: NORMAL
POTASSIUM SERPL-SCNC: 4.2 MMOL/L (ref 3.5–5.1)
RBC # BLD AUTO: 2.9 M/UL (ref 4.2–5.4)
SODIUM SERPL-SCNC: 138 MMOL/L (ref 136–145)
STOMATOCYTES BLD QL SMEAR: ABNORMAL
WBC # BLD AUTO: 6.64 K/UL (ref 4.5–11)

## 2024-03-21 PROCEDURE — 99308 SBSQ NF CARE LOW MDM 20: CPT | Mod: ,,, | Performed by: NURSE PRACTITIONER

## 2024-03-21 PROCEDURE — 25000003 PHARM REV CODE 250: Performed by: FAMILY MEDICINE

## 2024-03-21 PROCEDURE — 27000982 HC MATTRESS, MATRIX LAL RENTAL

## 2024-03-21 PROCEDURE — 94761 N-INVAS EAR/PLS OXIMETRY MLT: CPT

## 2024-03-21 PROCEDURE — 27000944

## 2024-03-21 PROCEDURE — 63600175 PHARM REV CODE 636 W HCPCS: Performed by: NURSE PRACTITIONER

## 2024-03-21 PROCEDURE — 97116 GAIT TRAINING THERAPY: CPT | Mod: CQ

## 2024-03-21 PROCEDURE — 97535 SELF CARE MNGMENT TRAINING: CPT

## 2024-03-21 PROCEDURE — 11000004 HC SNF PRIVATE

## 2024-03-21 PROCEDURE — 97530 THERAPEUTIC ACTIVITIES: CPT

## 2024-03-21 PROCEDURE — 84100 ASSAY OF PHOSPHORUS: CPT | Performed by: NURSE PRACTITIONER

## 2024-03-21 PROCEDURE — 80048 BASIC METABOLIC PNL TOTAL CA: CPT | Performed by: NURSE PRACTITIONER

## 2024-03-21 PROCEDURE — 99900035 HC TECH TIME PER 15 MIN (STAT)

## 2024-03-21 PROCEDURE — 85025 COMPLETE CBC W/AUTO DIFF WBC: CPT | Performed by: NURSE PRACTITIONER

## 2024-03-21 PROCEDURE — 83735 ASSAY OF MAGNESIUM: CPT | Performed by: NURSE PRACTITIONER

## 2024-03-21 PROCEDURE — 25000003 PHARM REV CODE 250: Performed by: NURSE PRACTITIONER

## 2024-03-21 RX ADMIN — SENNOSIDES AND DOCUSATE SODIUM 1 TABLET: 8.6; 5 TABLET ORAL at 08:03

## 2024-03-21 RX ADMIN — PHENYLEPHRINE HYDROCHLORIDE 1 SPRAY: 10 SPRAY NASAL at 08:03

## 2024-03-21 RX ADMIN — ASPIRIN 325 MG ORAL TABLET 325 MG: 325 PILL ORAL at 08:03

## 2024-03-21 RX ADMIN — NAPROXEN 500 MG: 250 TABLET ORAL at 08:03

## 2024-03-21 RX ADMIN — Medication 400 MG: at 08:03

## 2024-03-21 RX ADMIN — PANTOPRAZOLE SODIUM 40 MG: 40 TABLET, DELAYED RELEASE ORAL at 08:03

## 2024-03-21 RX ADMIN — OXYBUTYNIN CHLORIDE 10 MG: 5 TABLET, EXTENDED RELEASE ORAL at 08:03

## 2024-03-21 RX ADMIN — SERTRALINE HYDROCHLORIDE 100 MG: 50 TABLET ORAL at 08:03

## 2024-03-21 RX ADMIN — Medication 5000 UNITS: at 08:03

## 2024-03-21 RX ADMIN — MELATONIN TAB 3 MG 6 MG: 3 TAB at 08:03

## 2024-03-21 RX ADMIN — MICONAZOLE NITRATE: 20 CREAM TOPICAL at 08:03

## 2024-03-21 RX ADMIN — ENOXAPARIN SODIUM 40 MG: 40 INJECTION SUBCUTANEOUS at 05:03

## 2024-03-21 RX ADMIN — METOPROLOL SUCCINATE 50 MG: 50 TABLET, EXTENDED RELEASE ORAL at 08:03

## 2024-03-21 RX ADMIN — LIDOCAINE 1 PATCH: 50 PATCH CUTANEOUS at 02:03

## 2024-03-21 NOTE — PT/OT/SLP PROGRESS
Occupational Therapy   Treatment    Name: Roselia Eldridge  MRN: [id number]  Admitting Diagnosis:  Generalized weakness       Recommendations:     Discharge Recommendations:    Discharge Equipment Recommendations:  walker, rolling  Barriers to discharge:       Assessment:     Roselia Eldridge is a 78 y.o. female with a medical diagnosis of Generalized weakness.  She presents with weakness and decline with ADLs. Performance deficits affecting function are weakness, impaired endurance, impaired self care skills.     Rehab Prognosis:  Good; patient would benefit from acute skilled OT services to address these deficits and reach maximum level of function.       Plan:     Patient to be seen 5 x/week to address the above listed problems via self-care/home management, therapeutic activities, therapeutic exercises  Plan of Care Expires:    Plan of Care Reviewed with: patient    Subjective     Chief Complaints: none  Patient/Family Comments/goals: to go home  Pain/Comfort:       Objective:     Communicated with: Nurse prior to session.  Patient found supine with   upon OT entry to room.    General Precautions: Standard, fall    Orthopedic Precautions:spinal precautions  Braces: TLSO  Respiratory Status: Room air     Occupational Performance:     Bed Mobility:    Patient completed Rolling/Turning to Left with  modified independence  Patient completed Rolling/Turning to Right with modified independence  Patient completed Scooting/Bridging with modified independence  Patient completed Supine to Sit with modified independence  Patient completed Sit to Supine with modified independence     Functional Mobility/Transfers:  Patient completed Sit <> Stand Transfer with modified independence  with  rolling walker   Patient completed Bed <> Chair Transfer using Step Transfer technique with modified independence with rolling walker  Patient completed Toilet Transfer Step Transfer technique with modified independence with  rolling  walker  Patient completed  Shower Transfer Step Transfer technique with modified independence with rolling walker  Functional Mobility: Patient transferred within room with to/from shower in OT gym with Mod I.    Activities of Daily Living:  Feeding:  independence to perform self feeding  Grooming: stand by assistance to wash hair in shower and stand at sink to brush teeth  Bathing: supervision to perform bathing with compensatory techniques taught to reach bilateral feet  Upper Body Dressing: modified independence to nora hospital gown  Lower Body Dressing: minimum assistance to nora socks  Toileting: stand by assistance to perform toilet hygiene      [facility] 6 Click ADL: 23    Treatment & Education:  Pt educated on OT role/POC.   Importance of OOB activity with staff assistance.  Importance of sitting up in the chair throughout the day as tolerated, especially for meals   Safety during functional t/f and mobility  Importance of assisting with self-care activities      Patient left HOB elevated with all lines intact, call button in reach, and bed alarm on    GOALS:   Multidisciplinary Problems       Occupational Therapy Goals          Problem: Occupational Therapy    Goal Priority Disciplines Outcome Interventions   Occupational Therapy Goal     OT, PT/OT Ongoing, Progressing    Description: STG:  Pt will perform grooming with setup  Pt will bathe with Mod I  Pt will perform UE dressing with Mod I  Pt will perform LE dressing with Mod I  Pt will sit EOB x 15 min with supervisoin assistance  Pt will transfer bed/chair/bsc with Mod I  Pt will perform standing task x 15 min with supervision assistance  Pt will tolerate 30 minutes of tx without fatigue      LT.Restore to max I with self care and mobility.                         Time Tracking:     OT Date of Treatment: 24  OT Start Time: 1105  OT Stop Time: 1146  OT Total Time (min): 41 min    Billable Minutes:Self Care/Home Management 30 min   Therapeutic  Activity 11 min          Number of JAIRON visits since last OT visit: 1      Lyric Ring, OTR/L, CSRS  3/21/2024

## 2024-03-21 NOTE — ASSESSMENT & PLAN NOTE
03/11/24 improving, ate 100% of breakfast served this morning    03/15/24 reports good appetite this morning    03/18/24 improved    03/21/24 improved

## 2024-03-21 NOTE — PROGRESS NOTES
[facility]  Hospital Medicine  Progress Note    Patient Name: Roselia Eldridge  MRN: [id number]  Patient Class: IP- Swing   Admission Date: 3/11/2024  Length of Stay: 10 days  Attending Physician: Galindo De Jesus IV, DO  Primary Care Provider: Pamela Jurado DO        Subjective:     Principal Problem:Generalized weakness        HPI:  Roselia Eldridge is a 78 year old female with pmh cervical spondylosis, primary hypertension, OAB, mixed hyperlipidemia, depression, insomnia, GERD, CKD, CAD and vitamin D deficiency.   She was admitted to inpatient on 03/06/24 for thoracic pain , right rib pain after fall at home and dehydration. She was given IV fluids and norco for pain. She began to have visual hallucinations after norco. Norco was dcd and back pain was treated with lidoderm patch and ibuprofen.  Denies any hallucinations since Saturday. She continues to have impaired mobility due to T4 compression fracture. She has TLSO brace that she wears while out of bed. States ibuprofen is working well for her back pain. She is agreeable with swing bed for continued therapy. She is wet this morning, hesitant to let nurses clean her- encouraged her to let nurses/ CNAs clean her so her skin will not become irritated and break down- states she is not a child, she will not lay in it that long. She is alert and oriented x 3.     Overview/Hospital Course:  03/12/24 Called to room to check on Mrs. Eldridge- nurse told me that she had just woke up and that she is not talking to her. On my arrival, she is grunting. Asked her to talk to me and she is alert and responsive. She is oriented x 3. She tells me her neck hurts- states she has history of DDD. Asked her to rank pain on scale 1- 10 and she says it hurts a lot. Asked her what usually helps with her pain, states she has gotten botox injections in the past. Asked her if ibuproen has helped and she states it has. Will get dose of ibuprofen and monitor.  She is able to follow all commands, has equal strength bilaterally.  Review of records show imaging on 02/16/24.-The cervical vertebral body heights are maintained. There is minimal anterolisthesis of C3 over C4. There is no evidence of significant translational instability on flexion or extension views. Multilevel degenerative changes including mild multilevel   intervertebral disc space loss and mild marginal osteophytosis. There is multilevel degenerative uncovertebral joint arthropathy bilaterally. Also shows botox injections to neck on 02/19/24.   Consulted with Dr. De Jesus re neck pain.     03/13/24 Awake and resting in bed. Complains of neck pain - states she always has this pain but that robaxin and ultram have helped. Requesting robaxin this morning. Talked with her re therapy - States she was able to walk some yesterday- records show she walked 10 feet twice. Encouraged her to continue to participate and do more as she is able.  She has o2 per nc - does not use this at home - will wean to room air as she tolerates. Ate 100% of breakfast served.      03/14/24 Awake and resting in bed. Complains of pain at base of neck with radiation of pain to shoulders. States she took baclofen at hs while at home and that helped. She is on robaxin now. Had increase in AST/ALT  a few days ago . Statin and ibuprofen dcd. Now normal. She is requesting motrin for neck pain. Will order BID prn and watch labs. Complains of dry eyes. States she uses artificial tears at home - ordered this for her. Continue therapy for strengthening.      0955 went back to check on Mrs. Eldridge. She states she is comfortable now. Denies neck pain and headache.      03/15/24 Awake and resting in bed. States neck pain is better but continues to have some pain at base of neck. Reports good appetite. She reports using the bed pan. Talked with her re the importance of functioning to the highest level that she is able, including getting out of the bed to  go to the toilet. Her goal is to return home alone. Asked her to do more with therapy and to call us when she needs to go to the toilet.    03/18/24 Resting in bed. Complains of nasal congestion. States neck pain is much better. Walked total of 253 feet with therapist today. Continue work on balance and safety.    03/19/24 complains of nasal congestion. Added neosynephrine prn. Complains of psoriasis , itching to posterior scalp. Will order ketoconazole cream. Improving with therapy.    03/21/24 walked a total of 460 feet yesterday. Talked with her re discharge tomorrow. Will order home health with PT and OT to follow. PCP is Pamela Jurado. She uses [facility] to her meds. She states she is looking forward to discharge tomorrow. She will return home alone. States she has neighbors who can help her is needed.     Interval History: weakness    Review of Systems   Constitutional:  Negative for appetite change and fatigue.   HENT:  Negative for sinus pressure, sinus pain and trouble swallowing.         Nasal congestion   Eyes:  Negative for discharge and itching.   Respiratory:  Negative for cough.    Cardiovascular:  Negative for chest pain and palpitations.   Gastrointestinal:  Negative for constipation, diarrhea, nausea and vomiting.   Genitourinary:  Negative for difficulty urinating and dysuria.        OAB    Musculoskeletal:  Positive for arthralgias. Negative for back pain and joint swelling.        Reports neck pain is much better    Skin:  Negative for color change.        Complains of itching to posterior scalp     Neurological:  Negative for weakness, light-headedness and headaches.   Psychiatric/Behavioral:  Negative for hallucinations.         History of major depression   On sertraline      Objective:     Vital Signs (Most Recent):  Temp: 97.7 °F (36.5 °C) (03/21/24 0712)  Pulse: 64 (03/21/24 0712)  Resp: 18 (03/21/24 0712)  BP: (!) 101/51 (03/21/24 0712)  SpO2: 97 % (03/21/24 0712) Vital Signs  (24h Range):  Temp:  [97.7 °F (36.5 °C)-98.3 °F (36.8 °C)] 97.7 °F (36.5 °C)  Pulse:  [60-76] 64  Resp:  [18] 18  SpO2:  [93 %-97 %] 97 %  BP: ()/(51-68) 101/51     Weight:  (would not let me weight her. stated put the same weight she was when  she came here..)  Body mass index is 31.97 kg/m².    Intake/Output Summary (Last 24 hours) at 3/21/2024 0946  Last data filed at 3/21/2024 0816  Gross per 24 hour   Intake 360 ml   Output --   Net 360 ml         Physical Exam  HENT:      Head: Normocephalic.      Nose: Nose normal.      Mouth/Throat:      Mouth: Mucous membranes are moist.   Eyes:      Pupils: Pupils are equal, round, and reactive to light.   Neck:     Cardiovascular:      Rate and Rhythm: Normal rate and regular rhythm.      Pulses: Normal pulses.      Heart sounds: Normal heart sounds.   Pulmonary:      Effort: Pulmonary effort is normal. No respiratory distress.      Breath sounds: Normal breath sounds. No stridor. No wheezing or rhonchi.   Abdominal:      General: Bowel sounds are normal. There is no distension.      Palpations: Abdomen is soft. There is no mass.      Tenderness: There is no abdominal tenderness. There is no guarding or rebound.      Hernia: No hernia is present.   Musculoskeletal:         General: No swelling, tenderness or signs of injury.      Right lower leg: No edema.      Left lower leg: No edema.   Skin:     General: Skin is warm and dry.      Comments: Flaking , mild redness to left posterior scalp   Neurological:      Mental Status: She is alert and oriented to person, place, and time.      Motor: No weakness.             Significant Labs: All pertinent labs within the past 24 hours have been reviewed.  Recent Lab Results         03/21/24  0352        Bands 1       Baso # 0.03       Basophil % 0.5       Differential Method Manual       Eos # 0.22       Eos % 3.3        8       Hematocrit 28.6       Hemoglobin 9.1       Hypo Few       Lymph # 2.24       Lymph % 33.7         33       Magnesium  1.7       MCH 31.4       MCHC 31.8       MCV 98.6       Mono # 0.44       Mono % 6.6        6       MPV 9.2       Neutrophils, Abs 3.71       Neutrophils Relative 55.9       nRBC       Phosphorus Level 3.8       PLATELET MORPHOLOGY Normal       Platelet Count 274       RBC 2.90       RDW 12.8       Segmented Neutrophils, Man % 52       Stomatocytes Few       WBC 6.64               Significant Imaging: I have reviewed all pertinent imaging results/findings within the past 24 hours.    Assessment/Plan:      * Generalized weakness    03/11/24 t4 compression fracture   TLSO brace  while out of bed   Fall precautions       03/12/24 ambulated 48 feet with therapist yesterday    03/13/24 walked 10 feet twice yesterday    03/14/24 continue therapy    03/15/24 continue therapy, encouraged Mrs. Eldridge to put more effort in to therapy- to participate an full effort    03/18/24 walked 253 feet with therapist     03/19/24 improving, continue to work on balance and safety    03/21/24 walked 460 feet yesterday with therapist    Thoracic compression fracture, sequela  03/11/24   TLSO brace while out of bed  Ibuprofen and lidoderm patch for pain relief   Fall precautions      03/12/24 continue TLSO brace while out of bed  Ibuproen and lidoderm patch    03/14/24 TLSO brace while out of bed       03/19/24 no complaints of pain     Impaired mobility and ADLs    03/11/24 T 4 compression fracture  TLSO brace while out of bed  PT and OT to evaluate and treat     Acute midline back pain  03/11/24 lidoderm patch and ibuprofen for relief       Primary hypertension  Chronic, controlled. Latest blood pressure and vitals reviewed-     Temp:  [97.4 °F (36.3 °C)-98.2 °F (36.8 °C)]   Pulse:  [57-75]   Resp:  [18-20]   BP: (108-136)/(55-71)   SpO2:  [94 %-98 %] .   Home meds for hypertension were reviewed and noted below.   Hypertension Medications               chlorthalidone (HYGROTEN) 25 MG Tab Take 25 mg by mouth.     metoprolol succinate (TOPROL-XL) 50 MG 24 hr tablet Take 1 tablet by mouth 2 (two) times daily.            While in the hospital, will manage blood pressure as follows; Continue home antihypertensive regimen    Will utilize p.r.n. blood pressure medication only if patient's blood pressure greater than 180/110 and she develops symptoms such as worsening chest pain or shortness of breath.      03/12/24 stable    GERD (gastroesophageal reflux disease)    03/11/24 continue protonix    Poor appetite    03/11/24 improving, ate 100% of breakfast served this morning    03/15/24 reports good appetite this morning    03/18/24 improved    03/21/24 improved    Dyslipidemia  03/11/24 continue statin       Depression  Hx of major depression   Controlled - continue sertraline       03/12/24 continue sertraline       Neck pain  03/12/24 complains of left neck pain - would not rank pain on pain scale -states it hurts a lot  Chart review shows imaging on 02/16 and botox injection son 02/19/24. Talked with her re what has releived her pain in past- she states botox has helped, ibuprofen helps.   Added ibuprofen and robaxin prn     The cervical vertebral body heights are maintained. There is minimal anterolisthesis of C3 over C4. There is no evidence of significant translational instability on flexion or extension views. Multilevel degenerative changes including mild multilevel   intervertebral disc space loss and mild marginal osteophytosis. There is multilevel degenerative uncovertebral joint arthropathy bilaterally.     03/13/24 robaxin prn  ultram prn pain    03/14/24 reports neck pain is much better with cervical pillow       VTE Risk Mitigation (From admission, onward)           Ordered     enoxaparin injection 40 mg  Every 24 hours         03/11/24 1248                    Discharge Planning   MARILEE: 3/22/2024     Code Status: DNR   Is the patient medically ready for discharge?:     Reason for patient still in hospital (select all  that apply): Treatment  Discharge Plan A: (P) Home Health                  LESLEE Yoo  Department of Hospital Medicine   [facility]

## 2024-03-21 NOTE — PLAN OF CARE
[facility]  Discharge Assessment    Primary Care Provider: Pamela Jurado,      Discharge Assessment (most recent)       BRIEF DISCHARGE ASSESSMENT - 03/21/24 7608          Discharge Planning    Assessment Type Discharge Planning Brief Assessment (P)      Resource/Environmental Concerns none (P)      Patient/Family Anticipates Transition to home (P)      Patient/Family Anticipated Services at [facility] home [facility] (P)      Discharge Plan A Home Health (P)                    Spoke with Ms. Eldridge this morning about discharge home on tomorrow with [facility].  She says she's been on the phone since yesterday working on someone to take her home, she's not sure if she has someone but she will be reaching out to some friends today.

## 2024-03-21 NOTE — ASSESSMENT & PLAN NOTE
03/11/24 t4 compression fracture   TLSO brace  while out of bed   Fall precautions       03/12/24 ambulated 48 feet with therapist yesterday    03/13/24 walked 10 feet twice yesterday    03/14/24 continue therapy    03/15/24 continue therapy, encouraged Mrs. Eldridge to put more effort in to therapy- to participate an full effort    03/18/24 walked 253 feet with therapist     03/19/24 improving, continue to work on balance and safety    03/21/24 walked 460 feet yesterday with therapist

## 2024-03-21 NOTE — PT/OT/SLP PROGRESS
Physical Therapy Treatment    Patient Name:  Roselia Eldridge   MRN:  [id number]    Recommendations:     Discharge Recommendations: Low Intensity Therapy  Discharge Equipment Recommendations: walker, rolling  Barriers to discharge: None    Assessment:     Roselia Eldridge is a 78 y.o. female admitted with a medical diagnosis of Generalized weakness.  She presents with the following impairments/functional limitations: weakness, impaired endurance, impaired self care skills, impaired functional mobility, gait instability, impaired balance, decreased lower extremity function .    Rehab Prognosis: Good; patient would benefit from acute skilled PT services to address these deficits and reach maximum level of function.    Recent Surgery: * No surgery found *      Received Plan of Care per Raymond Peguero PT     Plan:     During this hospitalization, patient to be seen 5 x/week to address the identified rehab impairments via gait training, therapeutic activities, therapeutic exercises, neuromuscular re-education and progress toward the following goals:    Plan of Care Expires:  03/28/24    Subjective     Chief Complaint: none  Patient/Family Comments/goals: agrees to PT Tx  Pain/Comfort:  Pain Rating 1: 0/10      Objective:     Communicated with patient prior to session.  Patient found HOB elevated with TLSO upon PT entry to room.     General Precautions: Standard, fall  Orthopedic Precautions: spinal precautions  Braces: TLSO  Respiratory Status: Room air     Functional Mobility:  Bed Mobility:     Supine to Sit: modified independence  Transfers:     Sit to Stand:  modified independence with rolling walker  Gait: 250ft with rolling walker, TLSO, neck pillow and SVN; pt with steady pace, reciprocal pattern, fair to good safety awareness  Stairs:  Pt ascended/descended 5 stair(s) with No Assistive Device with bilateral handrails with Modified Independent.       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    GOALS:   Multidisciplinary Problems       Physical Therapy Goals          Problem: Physical Therapy    Goal Priority Disciplines Outcome Goal Variances Interventions   Physical Therapy Goal     PT, PT/OT Ongoing, Progressing     Description: Short-term Goals: 1.5 weeks  1. Patient will perform supine to/from sit with minimal assistance x 2 people to improve independence and safety with bed mobility.  2. Patient will perform sit to/from stand with a rolling walker with minimal assistance to improve independence and safety with transfers.  3. Patient will ambulate 150 feet with a rolling walker with contact guard assist to improve independence and safety with gait.  4. Patient will tolerate 15 minutes of physical therapy intervention to improve endurance and activity tolerance.    Long-term goals: 3 weeks  1. Patient will perform supine to/from sit with minimal assistance to improve independence and safety with bed mobility.  2. Patient will perform sit to/from stand with a rolling walker with contact guard assist to improve independence and safety with transfers.  3. Patient will ambulate 300 feet with a rolling walker with standby assist to improve independence and safety with gait.  4. Patient will tolerate 30 minutes of physical therapy intervention to improve endurance and activity tolerance.                         Time Tracking:     PT Received On: 03/21/24  PT Start Time: 1500     PT Stop Time: 1515  PT Total Time (min): 15 min     Billable Minutes: Gait Training 15    Treatment Type: Treatment  PT/PTA: PTA     Number of PTA visits since last PT visit: 3     03/21/2024

## 2024-03-21 NOTE — PLAN OF CARE
Problem: Adult Inpatient Plan of Care  Goal: Plan of Care Review  Outcome: Ongoing, Progressing  Goal: Absence of Hospital-Acquired Illness or Injury  Outcome: Ongoing, Progressing  Goal: Optimal Comfort and Wellbeing  Outcome: Ongoing, Progressing  Goal: Readiness for Transition of Care  Outcome: Ongoing, Progressing     Problem: Skin Injury Risk Increased  Goal: Skin Health and Integrity  Outcome: Ongoing, Progressing     Problem: Fall Injury Risk  Goal: Absence of Fall and Fall-Related Injury  Outcome: Ongoing, Progressing     Problem: Oral Intake Inadequate  Goal: Improved Oral Intake  Outcome: Ongoing, Progressing     Problem: Breathing Pattern Ineffective  Goal: Effective Breathing Pattern  Outcome: Ongoing, Progressing     Problem: Gas Exchange Impaired  Goal: Optimal Gas Exchange  Outcome: Ongoing, Progressing

## 2024-03-21 NOTE — SUBJECTIVE & OBJECTIVE
Interval History: weakness    Review of Systems   Constitutional:  Negative for appetite change and fatigue.   HENT:  Negative for sinus pressure, sinus pain and trouble swallowing.         Nasal congestion   Eyes:  Negative for discharge and itching.   Respiratory:  Negative for cough.    Cardiovascular:  Negative for chest pain and palpitations.   Gastrointestinal:  Negative for constipation, diarrhea, nausea and vomiting.   Genitourinary:  Negative for difficulty urinating and dysuria.        OAB    Musculoskeletal:  Positive for arthralgias. Negative for back pain and joint swelling.        Reports neck pain is much better    Skin:  Negative for color change.        Complains of itching to posterior scalp     Neurological:  Negative for weakness, light-headedness and headaches.   Psychiatric/Behavioral:  Negative for hallucinations.         History of major depression   On sertraline      Objective:     Vital Signs (Most Recent):  Temp: 97.7 °F (36.5 °C) (03/21/24 0712)  Pulse: 64 (03/21/24 0712)  Resp: 18 (03/21/24 0712)  BP: (!) 101/51 (03/21/24 0712)  SpO2: 97 % (03/21/24 0712) Vital Signs (24h Range):  Temp:  [97.7 °F (36.5 °C)-98.3 °F (36.8 °C)] 97.7 °F (36.5 °C)  Pulse:  [60-76] 64  Resp:  [18] 18  SpO2:  [93 %-97 %] 97 %  BP: ()/(51-68) 101/51     Weight:  (would not let me weight her. stated put the same weight she was when  she came here..)  Body mass index is 31.97 kg/m².    Intake/Output Summary (Last 24 hours) at 3/21/2024 0946  Last data filed at 3/21/2024 0816  Gross per 24 hour   Intake 360 ml   Output --   Net 360 ml         Physical Exam  HENT:      Head: Normocephalic.      Nose: Nose normal.      Mouth/Throat:      Mouth: Mucous membranes are moist.   Eyes:      Pupils: Pupils are equal, round, and reactive to light.   Neck:     Cardiovascular:      Rate and Rhythm: Normal rate and regular rhythm.      Pulses: Normal pulses.      Heart sounds: Normal heart sounds.   Pulmonary:       Effort: Pulmonary effort is normal. No respiratory distress.      Breath sounds: Normal breath sounds. No stridor. No wheezing or rhonchi.   Abdominal:      General: Bowel sounds are normal. There is no distension.      Palpations: Abdomen is soft. There is no mass.      Tenderness: There is no abdominal tenderness. There is no guarding or rebound.      Hernia: No hernia is present.   Musculoskeletal:         General: No swelling, tenderness or signs of injury.      Right lower leg: No edema.      Left lower leg: No edema.   Skin:     General: Skin is warm and dry.      Comments: Flaking , mild redness to left posterior scalp   Neurological:      Mental Status: She is alert and oriented to person, place, and time.      Motor: No weakness.             Significant Labs: All pertinent labs within the past 24 hours have been reviewed.  Recent Lab Results         03/21/24  0352        Bands 1       Baso # 0.03       Basophil % 0.5       Differential Method Manual       Eos # 0.22       Eos % 3.3        8       Hematocrit 28.6       Hemoglobin 9.1       Hypo Few       Lymph # 2.24       Lymph % 33.7        33       Magnesium  1.7       MCH 31.4       MCHC 31.8       MCV 98.6       Mono # 0.44       Mono % 6.6        6       MPV 9.2       Neutrophils, Abs 3.71       Neutrophils Relative 55.9       nRBC       Phosphorus Level 3.8       PLATELET MORPHOLOGY Normal       Platelet Count 274       RBC 2.90       RDW 12.8       Segmented Neutrophils, Man % 52       Stomatocytes Few       WBC 6.64               Significant Imaging: I have reviewed all pertinent imaging results/findings within the past 24 hours.

## 2024-03-22 VITALS
SYSTOLIC BLOOD PRESSURE: 105 MMHG | BODY MASS INDEX: 32.01 KG/M2 | OXYGEN SATURATION: 93 % | HEART RATE: 49 BPM | TEMPERATURE: 98 F | RESPIRATION RATE: 18 BRPM | DIASTOLIC BLOOD PRESSURE: 48 MMHG | HEIGHT: 65 IN | WEIGHT: 192.13 LBS

## 2024-03-22 PROCEDURE — 99900035 HC TECH TIME PER 15 MIN (STAT)

## 2024-03-22 PROCEDURE — 25000003 PHARM REV CODE 250: Performed by: FAMILY MEDICINE

## 2024-03-22 PROCEDURE — 99315 NF DSCHRG MGMT 30 MIN/LESS: CPT | Mod: ,,, | Performed by: NURSE PRACTITIONER

## 2024-03-22 PROCEDURE — 27000944

## 2024-03-22 PROCEDURE — 94761 N-INVAS EAR/PLS OXIMETRY MLT: CPT

## 2024-03-22 PROCEDURE — 25000003 PHARM REV CODE 250: Performed by: NURSE PRACTITIONER

## 2024-03-22 PROCEDURE — 27000982 HC MATTRESS, MATRIX LAL RENTAL

## 2024-03-22 RX ORDER — ACETAMINOPHEN 325 MG/1
650 TABLET ORAL EVERY 8 HOURS PRN
Refills: 0
Start: 2024-03-22

## 2024-03-22 RX ORDER — DOXYLAMINE SUCCINATE 25 MG
TABLET ORAL 2 TIMES DAILY
Qty: 57 G | Status: SHIPPED | OUTPATIENT
Start: 2024-03-22 | End: 2024-03-29

## 2024-03-22 RX ORDER — LIDOCAINE 50 MG/G
1 PATCH TOPICAL DAILY
Qty: 14 PATCH | Refills: 0 | Status: SHIPPED | OUTPATIENT
Start: 2024-03-22 | End: 2024-04-05

## 2024-03-22 RX ORDER — METHOCARBAMOL 500 MG/1
500 TABLET, FILM COATED ORAL 4 TIMES DAILY PRN
Qty: 20 TABLET | Refills: 0 | Status: SHIPPED | OUTPATIENT
Start: 2024-03-22 | End: 2024-03-29

## 2024-03-22 RX ORDER — NAPROXEN 500 MG/1
500 TABLET ORAL 2 TIMES DAILY PRN
Qty: 14 TABLET | Refills: 0 | Status: SHIPPED | OUTPATIENT
Start: 2024-03-22 | End: 2024-03-29

## 2024-03-22 RX ADMIN — SENNOSIDES AND DOCUSATE SODIUM 1 TABLET: 8.6; 5 TABLET ORAL at 08:03

## 2024-03-22 RX ADMIN — PHENYLEPHRINE HYDROCHLORIDE 1 SPRAY: 10 SPRAY NASAL at 08:03

## 2024-03-22 RX ADMIN — Medication 400 MG: at 08:03

## 2024-03-22 RX ADMIN — SERTRALINE HYDROCHLORIDE 100 MG: 50 TABLET ORAL at 08:03

## 2024-03-22 RX ADMIN — MICONAZOLE NITRATE: 20 CREAM TOPICAL at 08:03

## 2024-03-22 RX ADMIN — NAPROXEN 500 MG: 250 TABLET ORAL at 08:03

## 2024-03-22 RX ADMIN — METOPROLOL SUCCINATE 50 MG: 50 TABLET, EXTENDED RELEASE ORAL at 08:03

## 2024-03-22 RX ADMIN — PANTOPRAZOLE SODIUM 40 MG: 40 TABLET, DELAYED RELEASE ORAL at 08:03

## 2024-03-22 RX ADMIN — Medication 5000 UNITS: at 08:03

## 2024-03-22 RX ADMIN — OXYBUTYNIN CHLORIDE 10 MG: 5 TABLET, EXTENDED RELEASE ORAL at 08:03

## 2024-03-22 NOTE — ASSESSMENT & PLAN NOTE
03/11/24   TLSO brace while out of bed  Ibuprofen and lidoderm patch for pain relief   Fall precautions      03/12/24 continue TLSO brace while out of bed  Ibuproen and lidoderm patch    03/14/24 TLSO brace while out of bed       03/19/24 no complaints of pain     03/22/24 TLSO bracw while out of bed- follow up with PCP-

## 2024-03-22 NOTE — ASSESSMENT & PLAN NOTE
03/11/24 T 4 compression fracture  TLSO brace while out of bed  PT and OT to evaluate and treat       03/22/24 mobility has greatly improved with therapy. She has ambulated over 400 feet with walker, asccended and descended stairs. Will dc home with home health with PT and OT to follow

## 2024-03-22 NOTE — DISCHARGE SUMMARY
[facility] - Medical Surgical Unit  Hospital Medicine  Discharge Summary      Patient Name: Roselia Eldridge  MRN: [id number]  YESSENIA: [id number]  Patient Class: IP- Swing  Admission Date: 3/11/2024  Hospital Length of Stay: 11 days  Discharge Date and Time:  03/22/2024 7:14 AM  Attending Physician: Galindo De Jesus IV, DO   Discharging Provider: LESLEE Yoo  Primary Care Provider: Pamela Jurado DO    Primary Care Team: Networked reference to record PCT     HPI:   Roselia Eldridge is a 78 year old female with pmh cervical spondylosis, primary hypertension, OAB, mixed hyperlipidemia, depression, insomnia, GERD, CKD, CAD and vitamin D deficiency.   She was admitted to inpatient on 03/06/24 for thoracic pain , right rib pain after fall at home and dehydration. She was given IV fluids and norco for pain. She began to have visual hallucinations after norco. Norco was dcd and back pain was treated with lidoderm patch and ibuprofen.  Denies any hallucinations since Saturday. She continues to have impaired mobility due to T4 compression fracture. She has TLSO brace that she wears while out of bed. States ibuprofen is working well for her back pain. She is agreeable with swing bed for continued therapy. She is wet this morning, hesitant to let nurses clean her- encouraged her to let nurses/ CNAs clean her so her skin will not become irritated and break down- states she is not a child, she will not lay in it that long. She is alert and oriented x 3.     * No surgery found *      Hospital Course:   03/12/24 Called to room to check on Mrs. Eldridge- nurse told me that she had just woke up and that she is not talking to her. On my arrival, she is grunting. Asked her to talk to me and she is alert and responsive. She is oriented x 3. She tells me her neck hurts- states she has history of DDD. Asked her to rank pain on scale 1- 10 and she says it hurts a lot. Asked her what usually helps with her pain, states she has  gotten botox injections in the past. Asked her if ibuproen has helped and she states it has. Will get dose of ibuprofen and monitor. She is able to follow all commands, has equal strength bilaterally.  Review of records show imaging on 02/16/24.-The cervical vertebral body heights are maintained. There is minimal anterolisthesis of C3 over C4. There is no evidence of significant translational instability on flexion or extension views. Multilevel degenerative changes including mild multilevel   intervertebral disc space loss and mild marginal osteophytosis. There is multilevel degenerative uncovertebral joint arthropathy bilaterally. Also shows botox injections to neck on 02/19/24.   Consulted with Dr. De Jesus re neck pain.     03/13/24 Awake and resting in bed. Complains of neck pain - states she always has this pain but that robaxin and ultram have helped. Requesting robaxin this morning. Talked with her re therapy - States she was able to walk some yesterday- records show she walked 10 feet twice. Encouraged her to continue to participate and do more as she is able.  She has o2 per nc - does not use this at home - will wean to room air as she tolerates. Ate 100% of breakfast served.      03/14/24 Awake and resting in bed. Complains of pain at base of neck with radiation of pain to shoulders. States she took baclofen at hs while at home and that helped. She is on robaxin now. Had increase in AST/ALT  a few days ago . Statin and ibuprofen dcd. Now normal. She is requesting motrin for neck pain. Will order BID prn and watch labs. Complains of dry eyes. States she uses artificial tears at home - ordered this for her. Continue therapy for strengthening.      0955 went back to check on Mrs. Eldridge. She states she is comfortable now. Denies neck pain and headache.      03/15/24 Awake and resting in bed. States neck pain is better but continues to have some pain at base of neck. Reports good appetite. She reports using the  bed pan. Talked with her re the importance of functioning to the highest level that she is able, including getting out of the bed to go to the toilet. Her goal is to return home alone. Asked her to do more with therapy and to call us when she needs to go to the toilet.    03/18/24 Resting in bed. Complains of nasal congestion. States neck pain is much better. Walked total of 253 feet with therapist today. Continue work on balance and safety.    03/19/24 complains of nasal congestion. Added neosynephrine prn. Complains of psoriasis , itching to posterior scalp. Will order ketoconazole cream. Improving with therapy.    03/21/24 walked a total of 460 feet yesterday. Talked with her re discharge tomorrow. Will order home health with PT and OT to follow. PCP is Pamela Jurado. She uses [facility] her [facility]s. She states she is looking forward to discharge tomorrow. She will return home alone. States she has neighbors who can help her is needed.     03/22/24 mobility has greatly improved with therapy. She has ambulated over 400 feet with walker, asccended and descended stairs. Will dc home with home health with PT and OT to follow . Medications reconciled. Will make follow up appt with PCP for within 3 business days.     Goals of Care Treatment Preferences:  Code Status: DNR      Consults:   Consults (From admission, onward)          Status Ordering Provider     Inpatient consult to Registered Dietitian/Nutritionist  Once        Provider:  (Not yet assigned)    Completed CELSA FALK            Neuro  Compression fracture of thoracic vertebra with routine healing  03/11/24   TLSO brace while out of bed  Ibuprofen and lidoderm patch for pain relief   Fall precautions      03/12/24 continue TLSO brace while out of bed  Ibuproen and lidoderm patch    03/14/24 TLSO brace while out of bed       03/19/24 no complaints of pain     03/22/24 TLSO bracw while out of bed- follow up with PCP-      Psychiatric  Depression  Hx of major depression   Controlled - continue sertraline       03/12/24 continue sertraline     03/22/24 continue sertraline      Cardiac/Vascular  Dyslipidemia  03/11/24 continue statin       03/22/24 continue statin      Primary hypertension  Chronic, controlled. Latest blood pressure and vitals reviewed-     Temp:  [97.7 °F (36.5 °C)-98 °F (36.7 °C)]   Pulse:  [64-72]   Resp:  [18]   BP: (101-111)/(51-65)   SpO2:  [95 %-97 %] .   Home meds for hypertension were reviewed and noted below.   Hypertension Medications               chlorthalidone (HYGROTEN) 25 MG Tab Take 25 mg by mouth.    metoprolol succinate (TOPROL-XL) 50 MG 24 hr tablet Take 1 tablet by mouth 2 (two) times daily.            While in the hospital, will manage blood pressure as follows; Continue home antihypertensive regimen    Will utilize p.r.n. blood pressure medication only if patient's blood pressure greater than 180/110 and she develops symptoms such as worsening chest pain or shortness of breath.      03/12/24 stable    03/22/25! Stable, continue metoprolol     GI  GERD (gastroesophageal reflux disease)    03/11/24 continue protonix    03/22/23 continue protonix    Orthopedic  Neck pain  03/12/24 complains of left neck pain - would not rank pain on pain scale -states it hurts a lot  Chart review shows imaging on 02/16 and botox injection son 02/19/24. Talked with her re what has releived her pain in past- she states botox has helped, ibuprofen helps.   Added ibuprofen and robaxin prn     The cervical vertebral body heights are maintained. There is minimal anterolisthesis of C3 over C4. There is no evidence of significant translational instability on flexion or extension views. Multilevel degenerative changes including mild multilevel   intervertebral disc space loss and mild marginal osteophytosis. There is multilevel degenerative uncovertebral joint arthropathy bilaterally.     03/13/24 robaxin prn  ultram prn  pain    03/14/24 reports neck pain is much better with cervical pillow     Other  * Generalized weakness    03/11/24 t4 compression fracture   TLSO brace  while out of bed   Fall precautions       03/12/24 ambulated 48 feet with therapist yesterday    03/13/24 walked 10 feet twice yesterday    03/14/24 continue therapy    03/15/24 continue therapy, encouraged Mrs. Eldridge to put more effort in to therapy- to participate an full effort    03/18/24 walked 253 feet with therapist     03/19/24 improving, continue to work on balance and safety    03/21/24 walked 460 feet yesterday with therapist    03/22/24 mobility has greatly improved with therapy. She has ambulated over 400 feet with walker, asccended and descended stairs. Will dc home with home health with PT and OT to follow     Poor appetite    03/11/24 improving, ate 100% of breakfast served this morning    03/15/24 reports good appetite this morning    03/18/24 improved    03/21/24 improved    03/22/24 improved, continue regular diet     Impaired mobility and ADLs    03/11/24 T 4 compression fracture  TLSO brace while out of bed  PT and OT to evaluate and treat       03/22/24 mobility has greatly improved with therapy. She has ambulated over 400 feet with walker, asccended and descended stairs. Will dc home with home health with PT and OT to follow       Final Active Diagnoses:    Diagnosis Date Noted POA    PRINCIPAL PROBLEM:  Generalized weakness [R53.1] 03/11/2024 Yes    Compression fracture of thoracic vertebra with routine healing [S22.000D] 03/07/2024 Not Applicable    Impaired mobility and ADLs [Z74.09, Z78.9] 03/07/2024 Yes    Acute midline back pain [M54.9] 03/07/2024 Yes    Primary hypertension [I10] 03/11/2024 Yes    GERD (gastroesophageal reflux disease) [K21.9] 03/07/2024 Yes    Poor appetite [R63.0] 03/08/2024 Yes    Dyslipidemia [E78.5] 03/07/2024 Yes    Depression [F32.A] 03/11/2024 No    Neck pain [M54.2] 03/12/2024 Yes      Problems Resolved  During this Admission:       Discharged Condition: stable    Disposition: Home-Health Care [facility]    Follow Up:   Follow-up Information       Pamela Jurado, DO Follow up in 3 day(s).    Specialty: Family Medicine  Contact information:  [address] [place] [address] [phone]                           Patient Instructions:      Diet Adult Regular       Significant Diagnostic Studies: Labs: All labs within the past 24 hours have been reviewed    Pending Diagnostic Studies:       None           Medications:  Reconciled Home Medications:      Medication List        START taking these medications      acetaminophen 325 MG tablet  Commonly known as: TYLENOL  Take 2 tablets (650 mg total) by mouth every 8 (eight) hours as needed for Temperature greater than or Pain.     LIDOcaine 5 %  Commonly known as: LIDODERM  Place 1 patch onto the skin once daily. Remove & Discard patch within 12 hours or as directed by MD for 14 days     methocarbamoL 500 MG Tab  Commonly known as: ROBAXIN  Take 1 tablet (500 mg total) by mouth 4 (four) times daily as needed (back pain).     miconazole 2 % cream  Commonly known as: MICOTIN  Apply topically 2 (two) times daily. for 7 days     naproxen 500 MG tablet  Commonly known as: NAPROSYN  Take 1 tablet (500 mg total) by mouth 2 (two) times daily as needed (back pain).            CONTINUE taking these medications      albuterol 90 mcg/actuation inhaler  Commonly known as: PROVENTIL/VENTOLIN HFA  Inhale 2 puffs into the lungs every 6 (six) hours as needed.     aspirin 325 MG tablet  Take 325 mg by mouth every other day.     chlorthalidone 25 MG Tab  Commonly known as: HYGROTEN  Take 25 mg by mouth.     fluticasone-salmeterol 250-50 mcg/dose 250-50 mcg/dose diskus inhaler  Commonly known as: ADVAIR  Inhale 1 puff into the lungs.     metoprolol succinate 50 MG 24 hr tablet  Commonly known as: TOPROL-XL  Take 1 tablet by mouth 2 (two) times daily.      omeprazole 20 MG capsule  Commonly known as: PRILOSEC  Take 20 mg by mouth once daily.     oxybutynin 10 MG 24 hr tablet  Commonly known as: DITROPAN-XL  Take 1 tablet by mouth 2 (two) times daily.     sertraline 100 MG tablet  Commonly known as: ZOLOFT  Take 100 mg by mouth.     simvastatin 40 MG tablet  Commonly known as: ZOCOR  Take 1 tablet by mouth every evening.     traMADoL 50 mg tablet  Commonly known as: ULTRAM  Take 50 mg by mouth every 6 (six) hours as needed.     triamcinolone acetonide 0.1% 0.1 % cream  Commonly known as: KENALOG  Apply topically.     vitamin D 1000 units Tab  Commonly known as: VITAMIN D3  Take 5,000 Units by mouth.            STOP taking these medications      baclofen 10 MG tablet  Commonly known as: LIORESAL     HYDROcodone-acetaminophen 5-325 mg per tablet  Commonly known as: NORCO              Indwelling Lines/Drains at time of discharge:   Lines/Drains/Airways       None                   Time spent on the discharge of patient: 30 minutes         LESLEE Yoo  Department of Hospital Medicine  [facility] - Medical Surgical Unit

## 2024-03-22 NOTE — ASSESSMENT & PLAN NOTE
03/11/24 improving, ate 100% of breakfast served this morning    03/15/24 reports good appetite this morning    03/18/24 improved    03/21/24 improved    03/22/24 improved, continue regular diet

## 2024-03-22 NOTE — ASSESSMENT & PLAN NOTE
03/11/24 t4 compression fracture   TLSO brace  while out of bed   Fall precautions       03/12/24 ambulated 48 feet with therapist yesterday    03/13/24 walked 10 feet twice yesterday    03/14/24 continue therapy    03/15/24 continue therapy, encouraged Mrs. Eldridge to put more effort in to therapy- to participate an full effort    03/18/24 walked 253 feet with therapist     03/19/24 improving, continue to work on balance and safety    03/21/24 walked 460 feet yesterday with therapist    03/22/24 mobility has greatly improved with therapy. She has ambulated over 400 feet with walker, asccended and descended stairs. Will dc home with home health with PT and OT to follow

## 2024-03-22 NOTE — PLAN OF CARE
Problem: Adult Inpatient Plan of Care  Goal: Plan of Care Review  Outcome: Met  Goal: Absence of Hospital-Acquired Illness or Injury  Outcome: Met  Goal: Optimal Comfort and Wellbeing  Outcome: Met  Goal: Readiness for Transition of Care  Outcome: Met     Problem: Skin Injury Risk Increased  Goal: Skin Health and Integrity  Outcome: Met     Problem: Fall Injury Risk  Goal: Absence of Fall and Fall-Related Injury  Outcome: Met     Problem: Oral Intake Inadequate  Goal: Improved Oral Intake  Outcome: Met     Problem: Breathing Pattern Ineffective  Goal: Effective Breathing Pattern  Outcome: Met     Problem: Gas Exchange Impaired  Goal: Optimal Gas Exchange  Outcome: Met

## 2024-03-22 NOTE — ASSESSMENT & PLAN NOTE
Hx of major depression   Controlled - continue sertraline       03/12/24 continue sertraline     03/22/24 continue sertraline

## 2024-03-22 NOTE — PLAN OF CARE
[facility] - Medical Surgical Unit  Discharge Final Note    Primary Care Provider: Pamela Jurado DO    Expected Discharge Date: 3/22/2024    Final Discharge Note (most recent)       Final Note - 03/22/24 0955          Final Note    Assessment Type Final Discharge Note (P)      Anticipated Discharge Disposition Home-Health Care Svc (P)      What phone number can be called within the next 1-3 days to see how you are doing after discharge? [phone] (P)      Hospital Resources/Appts/Education Provided Provided patient/caregiver with written discharge plan information;Provided education on problems/symptoms using teachback;Appointments scheduled and added to AVS (P)         Post-Acute Status    Post-Acute Authorization Home Health (P)      Home Health Status Set-up Complete/Auth obtained (P)      Coverage Medicare A & B (P)      Patient choice form signed by patient/caregiver List with quality metrics by geographic area provided;List from CMS Compare;List from System Post-Acute Care (P)      Discharge Delays None known at this time (P)                      Important Message from Medicare        Date IMM was signed: 03/21/24  Time IMM was signed: 0845    Contact Info       Pamela Jurado DO   Specialty: Family Medicine   Relationship: PCP - [facility]  [address] [place] [address] [place] [address]   Phone: [phone]       Next Steps: Follow up in 3 day(s)    Pamela Jurado DO   Specialty: Family Medicine   Relationship: PCP - [facility]  [address]  [address] [place] [address]   Phone: [phone]       Next Steps: Follow up in 3 day(s)    Instructions: 03-  as 2:20pm          [facility] Eldridge [facility]

## 2024-03-22 NOTE — ASSESSMENT & PLAN NOTE
Chronic, controlled. Latest blood pressure and vitals reviewed-     Temp:  [97.7 °F (36.5 °C)-98 °F (36.7 °C)]   Pulse:  [64-72]   Resp:  [18]   BP: (101-111)/(51-65)   SpO2:  [95 %-97 %] .   Home meds for hypertension were reviewed and noted below.   Hypertension Medications               chlorthalidone (HYGROTEN) 25 MG Tab Take 25 mg by mouth.    metoprolol succinate (TOPROL-XL) 50 MG 24 hr tablet Take 1 tablet by mouth 2 (two) times daily.            While in the hospital, will manage blood pressure as follows; Continue home antihypertensive regimen    Will utilize p.r.n. blood pressure medication only if patient's blood pressure greater than 180/110 and she develops symptoms such as worsening chest pain or shortness of breath.      03/12/24 stable    03/22/25! Stable, continue metoprolol

## 2024-03-25 ENCOUNTER — PATIENT OUTREACH (OUTPATIENT)
Dept: ADMINISTRATIVE | Facility: CLINIC | Age: 79
End: 2024-03-25

## 2024-03-25 NOTE — PROGRESS NOTES
C3 nurse attempted to contact Roselia Eldridge  for a TCC post hospital discharge follow up call. No answer. Left voicemail with callback information. The patient has a scheduled HOSFU appointment with Pamela Jurado DO  on 3/25/2024 @ 220.   Unable to route to non [facility] provider

## 2024-03-26 NOTE — PROGRESS NOTES
Rec'd voicemail from patient.  Attempted to call back with no answer.    Left voicemail with call back information.

## 2024-03-27 ENCOUNTER — TELEPHONE (OUTPATIENT)
Dept: MEDSURG UNIT | Facility: HOSPITAL | Age: 79
End: 2024-03-27
Payer: MEDICARE

## 2024-03-27 NOTE — TELEPHONE ENCOUNTER
Attempted to contact Ms. Eldridge, unsuccessful, message with contact information left on voicemail.

## 2025-03-11 ENCOUNTER — HOSPITAL ENCOUNTER (EMERGENCY)
Facility: HOSPITAL | Age: 80
Discharge: HOME | End: 2025-03-11
Payer: MEDICARE

## 2025-03-11 VITALS
WEIGHT: 190 LBS | BODY MASS INDEX: 31.65 KG/M2 | SYSTOLIC BLOOD PRESSURE: 137 MMHG | HEART RATE: 70 BPM | TEMPERATURE: 99 F | HEIGHT: 65 IN | RESPIRATION RATE: 20 BRPM | DIASTOLIC BLOOD PRESSURE: 69 MMHG | OXYGEN SATURATION: 93 %

## 2025-03-11 DIAGNOSIS — R00.2 PALPITATIONS: Primary | ICD-10-CM

## 2025-03-11 DIAGNOSIS — R07.9 CHEST PAIN: ICD-10-CM

## 2025-03-11 LAB
ALBUMIN SERPL BCP-MCNC: 3.5 G/DL (ref 3.4–4.8)
ALBUMIN/GLOB SERPL: 1.3 {RATIO}
ALP SERPL-CCNC: 60 U/L (ref 40–150)
ALT SERPL W P-5'-P-CCNC: 9 U/L
ANION GAP SERPL CALCULATED.3IONS-SCNC: 12 MMOL/L (ref 7–16)
AST SERPL W P-5'-P-CCNC: 22 U/L (ref 11–45)
BASOPHILS # BLD AUTO: 0.04 K/UL (ref 0–0.2)
BASOPHILS NFR BLD AUTO: 0.5 % (ref 0–1)
BILIRUB SERPL-MCNC: 0.4 MG/DL
BUN SERPL-MCNC: 16 MG/DL (ref 10–20)
BUN/CREAT SERPL: 18 (ref 6–20)
CALCIUM SERPL-MCNC: 9.3 MG/DL (ref 8.4–10.2)
CHLORIDE SERPL-SCNC: 113 MMOL/L (ref 98–107)
CO2 SERPL-SCNC: 22 MMOL/L (ref 23–31)
CREAT SERPL-MCNC: 0.87 MG/DL (ref 0.55–1.02)
DIFFERENTIAL METHOD BLD: ABNORMAL
EGFR (NO RACE VARIABLE) (RUSH/TITUS): 68 ML/MIN/1.73M2
EOSINOPHIL # BLD AUTO: 0.33 K/UL (ref 0–0.5)
EOSINOPHIL NFR BLD AUTO: 4.5 % (ref 1–4)
ERYTHROCYTE [DISTWIDTH] IN BLOOD BY AUTOMATED COUNT: 14.3 % (ref 11.5–14.5)
GLOBULIN SER-MCNC: 2.8 G/DL (ref 2–4)
GLUCOSE SERPL-MCNC: 135 MG/DL (ref 82–115)
HCT VFR BLD AUTO: 29.4 % (ref 38–47)
HGB BLD-MCNC: 9.8 G/DL (ref 12–16)
IMM GRANULOCYTES # BLD AUTO: 0.03 K/UL (ref 0–0.04)
IMM GRANULOCYTES NFR BLD: 0.4 % (ref 0–0.4)
LYMPHOCYTES # BLD AUTO: 1.39 K/UL (ref 1–4.8)
LYMPHOCYTES NFR BLD AUTO: 18.9 % (ref 27–41)
MCH RBC QN AUTO: 33.6 PG (ref 27–31)
MCHC RBC AUTO-ENTMCNC: 33.3 G/DL (ref 32–36)
MCV RBC AUTO: 100.7 FL (ref 80–96)
MONOCYTES # BLD AUTO: 0.54 K/UL (ref 0–0.8)
MONOCYTES NFR BLD AUTO: 7.3 % (ref 2–6)
MPC BLD CALC-MCNC: 10.5 FL (ref 9.4–12.4)
NEUTROPHILS # BLD AUTO: 5.04 K/UL (ref 1.8–7.7)
NEUTROPHILS NFR BLD AUTO: 68.4 % (ref 53–65)
NRBC # BLD AUTO: 0.02 X10E3/UL
NRBC, AUTO (.00): 0.3 %
PLATELET # BLD AUTO: 183 K/UL (ref 150–400)
POTASSIUM SERPL-SCNC: 3.6 MMOL/L (ref 3.5–5.1)
PROT SERPL-MCNC: 6.3 G/DL (ref 5.8–7.6)
RBC # BLD AUTO: 2.92 M/UL (ref 4.2–5.4)
SODIUM SERPL-SCNC: 143 MMOL/L (ref 136–145)
TROPONIN I SERPL HS-MCNC: 3 NG/L
WBC # BLD AUTO: 7.37 K/UL (ref 4.5–11)

## 2025-03-11 PROCEDURE — 99284 EMERGENCY DEPT VISIT MOD MDM: CPT | Mod: GF | Performed by: NURSE PRACTITIONER

## 2025-03-11 PROCEDURE — 85025 COMPLETE CBC W/AUTO DIFF WBC: CPT | Performed by: NURSE PRACTITIONER

## 2025-03-11 PROCEDURE — 80053 COMPREHEN METABOLIC PANEL: CPT | Performed by: NURSE PRACTITIONER

## 2025-03-11 PROCEDURE — 93010 ELECTROCARDIOGRAM REPORT: CPT | Performed by: HOSPITALIST

## 2025-03-11 PROCEDURE — 99284 EMERGENCY DEPT VISIT MOD MDM: CPT | Mod: 25

## 2025-03-11 PROCEDURE — 93005 ELECTROCARDIOGRAM TRACING: CPT

## 2025-03-11 PROCEDURE — 84484 ASSAY OF TROPONIN QUANT: CPT | Performed by: NURSE PRACTITIONER

## 2025-03-11 NOTE — ED PROVIDER NOTES
Encounter Date: 3/11/2025       History     Chief Complaint   Patient presents with    Shortness of Breath     Productive cough and SOB for approx one week.     Patient presents to the ED with complaints of palpitations, states she has been cleaning out a house that she is wanting to sell and thinks she just overdid it the last several days. Reports symptoms have ongoing for several days.     The history is provided by the patient.     Review of patient's allergies indicates:   Allergen Reactions    Norco [hydrocodone-acetaminophen] Hallucinations    Pentazocine Nausea Only    Talwin compound Nausea And Vomiting     Past Medical History:   Diagnosis Date    Asthma     Hypertension     Mixed hyperlipidemia      Past Surgical History:   Procedure Laterality Date    BUNIONECTOMY      CATARACT EXTRACTION, BILATERAL       SECTION      COSMETIC SURGERY       No family history on file.  Social History[1]  Review of Systems   Constitutional: Negative.    Respiratory: Negative.     Cardiovascular:  Positive for palpitations.   Musculoskeletal: Negative.    Neurological: Negative.    Psychiatric/Behavioral: Negative.     All other systems reviewed and are negative.      Physical Exam     Initial Vitals [25 1639]   BP Pulse Resp Temp SpO2   135/80 80 18 98.7 °F (37.1 °C) 97 %      MAP       --         Physical Exam    Vitals reviewed.  Constitutional: She appears well-developed and well-nourished.   Cardiovascular:  Normal rate, regular rhythm, normal heart sounds and intact distal pulses.           Pulmonary/Chest: Breath sounds normal.   Musculoskeletal:         General: Normal range of motion.     Neurological: She is alert and oriented to person, place, and time. She has normal strength. GCS score is 15. GCS eye subscore is 4. GCS verbal subscore is 5. GCS motor subscore is 6.   Skin: Skin is warm and dry. Capillary refill takes less than 2 seconds.   Psychiatric: She has a normal mood and affect. Her  behavior is normal. Judgment and thought content normal.         Medical Screening Exam   See Full Note    ED Course   Procedures  Labs Reviewed   COMPREHENSIVE METABOLIC PANEL - Abnormal       Result Value    Sodium 143      Potassium 3.6      Chloride 113 (*)     CO2 22 (*)     Anion Gap 12      Glucose 135 (*)     BUN 16      Creatinine 0.87      BUN/Creatinine Ratio 18      Calcium 9.3      Total Protein 6.3      Albumin 3.5      Globulin 2.8      A/G Ratio 1.3      Bilirubin, Total 0.4      Alk Phos 60      ALT 9      AST 22      eGFR 68     CBC WITH DIFFERENTIAL - Abnormal    WBC 7.37      RBC 2.92 (*)     Hemoglobin 9.8 (*)     Hematocrit 29.4 (*)     .7 (*)     MCH 33.6 (*)     MCHC 33.3      RDW 14.3      Platelet Count 183      MPV 10.5      Neutrophils % 68.4 (*)     Lymphocytes % 18.9 (*)     Monocytes % 7.3 (*)     Eosinophils % 4.5 (*)     Basophils % 0.5      Immature Granulocytes % 0.4      nRBC, Auto 0.3 (*)     Neutrophils, Abs 5.04      Lymphocytes, Absolute 1.39      Monocytes, Absolute 0.54      Eosinophils, Absolute 0.33      Basophils, Absolute 0.04      Immature Granulocytes, Absolute 0.03      nRBC, Absolute 0.02 (*)     Diff Type Auto     TROPONIN I - Normal    Troponin I High Sensitivity 3.0     CBC W/ AUTO DIFFERENTIAL    Narrative:     The following orders were created for panel order CBC auto differential.  Procedure                               Abnormality         Status                     ---------                               -----------         ------                     CBC with Differential[[id number]]       Abnormal            Final result                 Please view results for these tests on the individual orders.          Imaging Results    None          Medications - No data to display  Medical Decision Making  MDM    Patient presents for emergent evaluation of acute palpitations that poses a threat to life and/or bodily function.    In the ED patient found to have  acute palpitations.    I ordered labs and personally reviewed them.  Labs significant for WBC 7.37, H/H 9.8/29.4, Platelets 183, Na 143, K 3.6, Cl 113, BUN 16, Creat 0.87, Troponin 3.0  I ordered EKG and personally reviewed it.  EKG significant for NSR.      Discharge MDM  I discussed the treatment and discharge plan with the patient.  Patient was discharged in stable condition.  Detailed return precautions discussed.    Amount and/or Complexity of Data Reviewed  Labs: ordered.                                      Clinical Impression:   Final diagnoses:  [R07.9] Chest pain  [R00.2] Palpitations (Primary)        ED Disposition Condition    Discharge Stable          ED Prescriptions    None       Follow-up Information    None            [1]   Social History  Tobacco Use    Smoking status: Never    Smokeless tobacco: Never   Substance Use Topics    Alcohol use: Yes     Comment: daily    Drug use: Never        Dania Ren, NILES  03/11/25 8432     154.94

## 2025-03-12 LAB
OHS QRS DURATION: 72 MS
OHS QTC CALCULATION: 415 MS